# Patient Record
Sex: FEMALE | Race: WHITE
[De-identification: names, ages, dates, MRNs, and addresses within clinical notes are randomized per-mention and may not be internally consistent; named-entity substitution may affect disease eponyms.]

---

## 2017-02-15 ENCOUNTER — MYAURORA ACCOUNT LINK (OUTPATIENT)
Dept: OTHER | Age: 72
End: 2017-02-15

## 2017-02-15 ENCOUNTER — HOSPITAL ENCOUNTER (OUTPATIENT)
Dept: CV DIAGNOSTICS | Facility: HOSPITAL | Age: 72
Discharge: HOME | End: 2017-02-15
Attending: INTERNAL MEDICINE

## 2017-02-15 DIAGNOSIS — I35.0 AORTIC VALVE STENOSIS, UNSPECIFIED ETIOLOGY: ICD-10-CM

## 2017-02-21 ENCOUNTER — PRIOR ORIGINAL RECORDS (OUTPATIENT)
Dept: OTHER | Age: 72
End: 2017-02-21

## 2017-03-31 PROCEDURE — 86704 HEP B CORE ANTIBODY TOTAL: CPT | Performed by: INTERNAL MEDICINE

## 2017-03-31 PROCEDURE — 86480 TB TEST CELL IMMUN MEASURE: CPT | Performed by: INTERNAL MEDICINE

## 2017-09-21 PROCEDURE — 88175 CYTOPATH C/V AUTO FLUID REDO: CPT | Performed by: INTERNAL MEDICINE

## 2018-02-19 ENCOUNTER — HOSPITAL ENCOUNTER (OUTPATIENT)
Dept: CV DIAGNOSTICS | Facility: HOSPITAL | Age: 73
Discharge: HOME | End: 2018-02-19
Attending: INTERNAL MEDICINE

## 2018-02-19 ENCOUNTER — MYAURORA ACCOUNT LINK (OUTPATIENT)
Dept: OTHER | Age: 73
End: 2018-02-19

## 2018-02-19 ENCOUNTER — PRIOR ORIGINAL RECORDS (OUTPATIENT)
Dept: OTHER | Age: 73
End: 2018-02-19

## 2018-02-19 DIAGNOSIS — I25.10 CORONARY ARTERIOSCLEROSIS: ICD-10-CM

## 2018-02-27 ENCOUNTER — MYAURORA ACCOUNT LINK (OUTPATIENT)
Dept: OTHER | Age: 73
End: 2018-02-27

## 2018-02-27 ENCOUNTER — PRIOR ORIGINAL RECORDS (OUTPATIENT)
Dept: OTHER | Age: 73
End: 2018-02-27

## 2018-07-26 ENCOUNTER — PRIOR ORIGINAL RECORDS (OUTPATIENT)
Dept: OTHER | Age: 73
End: 2018-07-26

## 2018-09-19 PROCEDURE — 86480 TB TEST CELL IMMUN MEASURE: CPT | Performed by: INTERNAL MEDICINE

## 2018-11-29 PROCEDURE — 88175 CYTOPATH C/V AUTO FLUID REDO: CPT | Performed by: INTERNAL MEDICINE

## 2019-02-19 ENCOUNTER — PRIOR ORIGINAL RECORDS (OUTPATIENT)
Dept: OTHER | Age: 74
End: 2019-02-19

## 2019-02-28 VITALS
DIASTOLIC BLOOD PRESSURE: 68 MMHG | HEIGHT: 63 IN | BODY MASS INDEX: 28.17 KG/M2 | SYSTOLIC BLOOD PRESSURE: 118 MMHG | HEART RATE: 90 BPM | WEIGHT: 159 LBS

## 2019-02-28 VITALS
HEIGHT: 63 IN | HEART RATE: 90 BPM | BODY MASS INDEX: 28 KG/M2 | SYSTOLIC BLOOD PRESSURE: 110 MMHG | DIASTOLIC BLOOD PRESSURE: 62 MMHG | WEIGHT: 158 LBS

## 2019-03-01 VITALS
HEIGHT: 63 IN | DIASTOLIC BLOOD PRESSURE: 64 MMHG | WEIGHT: 164 LBS | BODY MASS INDEX: 29.06 KG/M2 | SYSTOLIC BLOOD PRESSURE: 118 MMHG | HEART RATE: 92 BPM

## 2019-03-21 ENCOUNTER — HOSPITAL ENCOUNTER (OUTPATIENT)
Dept: CV DIAGNOSTICS | Facility: HOSPITAL | Age: 74
Discharge: HOME | End: 2019-03-21
Attending: INTERNAL MEDICINE

## 2019-03-21 DIAGNOSIS — I35.0 AORTIC VALVE STENOSIS, ETIOLOGY OF CARDIAC VALVE DISEASE UNSPECIFIED: ICD-10-CM

## 2019-03-21 PROCEDURE — 93306 TTE W/DOPPLER COMPLETE: CPT | Performed by: INTERNAL MEDICINE

## 2019-03-28 ENCOUNTER — TELEPHONE (OUTPATIENT)
Dept: CARDIOLOGY | Age: 74
End: 2019-03-28

## 2019-04-04 ENCOUNTER — TELEPHONE (OUTPATIENT)
Dept: CARDIOLOGY | Age: 74
End: 2019-04-04

## 2019-04-05 ENCOUNTER — TELEPHONE (OUTPATIENT)
Dept: CARDIOLOGY | Age: 74
End: 2019-04-05

## 2019-04-05 RX ORDER — OLMESARTAN MEDOXOMIL AND HYDROCHLOROTHIAZIDE 20/12.5 20; 12.5 MG/1; MG/1
1 TABLET ORAL DAILY
Qty: 90 TABLET | Refills: 1 | Status: SHIPPED | OUTPATIENT
Start: 2019-04-05 | End: 2019-11-01 | Stop reason: ALTCHOICE

## 2019-04-05 RX ORDER — OLMESARTAN MEDOXOMIL AND HYDROCHLOROTHIAZIDE 20/12.5 20; 12.5 MG/1; MG/1
1 TABLET ORAL DAILY
Qty: 30 TABLET | Refills: 3 | Status: SHIPPED | OUTPATIENT
Start: 2019-04-05 | End: 2019-11-01 | Stop reason: ALTCHOICE

## 2019-04-05 RX ORDER — OLMESARTAN MEDOXOMIL AND HYDROCHLOROTHIAZIDE 20/12.5 20; 12.5 MG/1; MG/1
TABLET ORAL
COMMUNITY
Start: 2018-11-19 | End: 2019-04-05 | Stop reason: SDUPTHER

## 2019-04-05 RX ORDER — GABAPENTIN 100 MG/1
100 CAPSULE ORAL
COMMUNITY
Start: 2012-07-17 | End: 2020-02-25 | Stop reason: ALTCHOICE

## 2019-04-05 RX ORDER — HYDROXYCHLOROQUINE SULFATE 200 MG/1
TABLET, FILM COATED ORAL
COMMUNITY
Start: 2014-06-11 | End: 2020-02-25 | Stop reason: ALTCHOICE

## 2019-04-05 RX ORDER — INSULIN GLARGINE 100 [IU]/ML
INJECTION, SOLUTION SUBCUTANEOUS
COMMUNITY
Start: 2009-06-23 | End: 2020-02-25 | Stop reason: ALTCHOICE

## 2019-04-05 RX ORDER — LEVOTHYROXINE SODIUM 88 UG/1
TABLET ORAL
COMMUNITY
Start: 2012-07-17

## 2019-04-05 RX ORDER — METOPROLOL SUCCINATE 50 MG/1
50 TABLET, EXTENDED RELEASE ORAL
COMMUNITY
Start: 2018-02-27 | End: 2019-06-02 | Stop reason: SDUPTHER

## 2019-04-05 RX ORDER — FOLIC ACID 1 MG/1
1 TABLET ORAL
COMMUNITY
Start: 2015-01-13 | End: 2020-02-25 | Stop reason: ALTCHOICE

## 2019-04-05 RX ORDER — CETIRIZINE HYDROCHLORIDE 10 MG/1
10 TABLET ORAL
COMMUNITY
Start: 2012-07-17

## 2019-04-05 RX ORDER — IBUPROFEN 200 MG
500 CAPSULE ORAL
COMMUNITY
Start: 2012-07-17 | End: 2020-02-25 | Stop reason: ALTCHOICE

## 2019-04-05 RX ORDER — ATORVASTATIN CALCIUM 40 MG/1
40 TABLET, FILM COATED ORAL
COMMUNITY
Start: 2018-02-27 | End: 2019-11-14 | Stop reason: SDUPTHER

## 2019-04-05 RX ORDER — FAMOTIDINE 20 MG
TABLET ORAL
COMMUNITY
Start: 2012-07-17

## 2019-04-08 ENCOUNTER — TELEPHONE (OUTPATIENT)
Dept: CARDIOLOGY | Age: 74
End: 2019-04-08

## 2019-04-09 RX ORDER — LOSARTAN POTASSIUM AND HYDROCHLOROTHIAZIDE 12.5; 5 MG/1; MG/1
1 TABLET ORAL DAILY
Qty: 30 TABLET | Refills: 6 | Status: SHIPPED | OUTPATIENT
Start: 2019-04-09 | End: 2019-11-01 | Stop reason: ALTCHOICE

## 2019-04-11 ENCOUNTER — HOSPITAL ENCOUNTER (OUTPATIENT)
Age: 74
Discharge: HOME | End: 2019-04-11
Payer: MEDICARE

## 2019-04-11 VITALS
SYSTOLIC BLOOD PRESSURE: 163 MMHG | TEMPERATURE: 98 F | HEART RATE: 88 BPM | RESPIRATION RATE: 17 BRPM | OXYGEN SATURATION: 95 % | DIASTOLIC BLOOD PRESSURE: 69 MMHG

## 2019-04-11 DIAGNOSIS — T78.40XA ALLERGIC REACTION, INITIAL ENCOUNTER: Primary | ICD-10-CM

## 2019-04-11 PROCEDURE — 99204 OFFICE O/P NEW MOD 45 MIN: CPT

## 2019-04-11 PROCEDURE — 99213 OFFICE O/P EST LOW 20 MIN: CPT

## 2019-04-11 RX ORDER — FAMOTIDINE 20 MG/1
20 TABLET ORAL ONCE
Status: COMPLETED | OUTPATIENT
Start: 2019-04-11 | End: 2019-04-11

## 2019-04-11 RX ORDER — PREDNISONE 20 MG/1
40 TABLET ORAL DAILY
Qty: 10 TABLET | Refills: 0 | Status: SHIPPED | OUTPATIENT
Start: 2019-04-11 | End: 2019-04-16

## 2019-04-11 NOTE — ED PROVIDER NOTES
Patient Seen in: [facility]    History   Patient presents with:   Allergic Rxn Allergies (immune)    Stated Complaint: FACIAL SWELLING/REDNESS SINCE MONDAY-POSS REACTION TO MEDICATION     HPI  Patient is a 70-year-old female with • HX BREAST CANCER  9/21/10    Invasive Ductal CA   • IMPLANT LEFT  Nov. 2011    Reconstructive for mastectomy   • MASTECTOMY LEFT  9/21/10    Invasive Ductal CA   • OTHER SURGICAL HISTORY      basal cell removal   • PORT-A-CATHETER  REMOVAL N/A 2/16/2012 Psychiatric: She has a normal mood and affect. Her behavior is normal.   Nursing note and vitals reviewed. ED Course   Labs Reviewed - No data to display               MDM   History and physical consistent with allergic reaction.   Tomas

## 2019-04-11 NOTE — ED INITIAL ASSESSMENT (HPI)
Started having left side facial redness four days ago which progressed to full face. Very itchy. Patient on Cimzia injection which patient's  called the company about which per the company is a common adverse effect.  Denies tingling/tightness on thr

## 2019-04-25 RX ORDER — METHOTREXATE 2.5 MG/1
TABLET ORAL
COMMUNITY
End: 2020-02-25 | Stop reason: ALTCHOICE

## 2019-06-03 RX ORDER — METOPROLOL SUCCINATE 50 MG/1
TABLET, EXTENDED RELEASE ORAL
Qty: 90 TABLET | Refills: 2 | Status: SHIPPED | OUTPATIENT
Start: 2019-06-03 | End: 2020-02-20 | Stop reason: SDUPTHER

## 2019-10-25 ENCOUNTER — TELEPHONE (OUTPATIENT)
Dept: CARDIOLOGY | Age: 74
End: 2019-10-25

## 2019-10-28 RX ORDER — HYDROCHLOROTHIAZIDE 12.5 MG/1
12.5 CAPSULE, GELATIN COATED ORAL DAILY
Qty: 90 CAPSULE | Refills: 1 | Status: SHIPPED | OUTPATIENT
Start: 2019-10-28 | End: 2019-11-01 | Stop reason: SDUPTHER

## 2019-10-28 RX ORDER — LOSARTAN POTASSIUM 50 MG/1
50 TABLET ORAL DAILY
Qty: 90 TABLET | Refills: 1 | Status: SHIPPED | OUTPATIENT
Start: 2019-10-28 | End: 2019-11-01 | Stop reason: SDUPTHER

## 2019-10-31 RX ORDER — LOSARTAN POTASSIUM AND HYDROCHLOROTHIAZIDE 12.5; 5 MG/1; MG/1
1 TABLET ORAL DAILY
Qty: 30 TABLET | Refills: 5 | OUTPATIENT
Start: 2019-10-31

## 2019-11-01 RX ORDER — LOSARTAN POTASSIUM 50 MG/1
50 TABLET ORAL DAILY
Qty: 90 TABLET | Refills: 1 | Status: SHIPPED | OUTPATIENT
Start: 2019-11-01 | End: 2020-02-25 | Stop reason: SDUPTHER

## 2019-11-01 RX ORDER — HYDROCHLOROTHIAZIDE 12.5 MG/1
12.5 CAPSULE, GELATIN COATED ORAL DAILY
Qty: 90 CAPSULE | Refills: 1 | Status: SHIPPED | OUTPATIENT
Start: 2019-11-01 | End: 2020-02-25 | Stop reason: SDUPTHER

## 2019-11-08 ENCOUNTER — DOCUMENTATION (OUTPATIENT)
Dept: CARDIOLOGY | Age: 74
End: 2019-11-08

## 2019-11-14 ENCOUNTER — TELEPHONE (OUTPATIENT)
Dept: CARDIOLOGY | Age: 74
End: 2019-11-14

## 2019-11-14 DIAGNOSIS — E78.00 PURE HYPERCHOLESTEROLEMIA: Primary | ICD-10-CM

## 2019-11-14 RX ORDER — ATORVASTATIN CALCIUM 40 MG/1
TABLET, FILM COATED ORAL
Qty: 30 TABLET | Refills: 0 | Status: SHIPPED | OUTPATIENT
Start: 2019-11-14 | End: 2019-12-12 | Stop reason: SDUPTHER

## 2019-11-25 ENCOUNTER — LAB ENCOUNTER (OUTPATIENT)
Dept: LAB | Facility: HOSPITAL | Age: 74
End: 2019-11-25
Attending: INTERNAL MEDICINE
Payer: MEDICARE

## 2019-11-25 DIAGNOSIS — E78.00 PURE HYPERCHOLESTEROLEMIA: Primary | ICD-10-CM

## 2019-11-25 LAB
CHOLEST SERPL-MCNC: 136 MG/DL
CHOLEST/HDLC SERPL: NORMAL {RATIO}
HDLC SERPL-MCNC: 62 MG/DL
LDLC SERPL CALC-MCNC: 44 MG/DL
LENGTH OF FAST TIME PATIENT: NORMAL H
NONHDLC SERPL-MCNC: NORMAL MG/DL
TRIGL SERPL-MCNC: 151 MG/DL
VLDLC SERPL CALC-MCNC: NORMAL MG/DL

## 2019-11-25 PROCEDURE — 80061 LIPID PANEL: CPT

## 2019-11-25 PROCEDURE — 36415 COLL VENOUS BLD VENIPUNCTURE: CPT

## 2019-11-29 ENCOUNTER — TELEPHONE (OUTPATIENT)
Dept: CARDIOLOGY | Age: 74
End: 2019-11-29

## 2019-11-29 ENCOUNTER — CLINICAL ABSTRACT (OUTPATIENT)
Dept: CARDIOLOGY | Age: 74
End: 2019-11-29

## 2019-12-12 DIAGNOSIS — E78.00 PURE HYPERCHOLESTEROLEMIA: ICD-10-CM

## 2019-12-12 RX ORDER — ATORVASTATIN CALCIUM 40 MG/1
TABLET, FILM COATED ORAL
Qty: 90 TABLET | Refills: 3 | Status: SHIPPED | OUTPATIENT
Start: 2019-12-12 | End: 2020-12-08

## 2019-12-23 ENCOUNTER — HOSPITAL ENCOUNTER (EMERGENCY)
Facility: HOSPITAL | Age: 74
Discharge: HOME | End: 2019-12-23
Attending: EMERGENCY MEDICINE
Payer: MEDICARE

## 2019-12-23 VITALS
TEMPERATURE: 97 F | HEART RATE: 80 BPM | WEIGHT: 150 LBS | DIASTOLIC BLOOD PRESSURE: 79 MMHG | SYSTOLIC BLOOD PRESSURE: 167 MMHG | OXYGEN SATURATION: 99 % | HEIGHT: 63 IN | RESPIRATION RATE: 18 BRPM | BODY MASS INDEX: 26.58 KG/M2

## 2019-12-23 DIAGNOSIS — E10.649 TYPE 1 DIABETES MELLITUS WITH HYPOGLYCEMIA WITHOUT COMA (HCC): Primary | ICD-10-CM

## 2019-12-23 DIAGNOSIS — E87.6 HYPOKALEMIA: ICD-10-CM

## 2019-12-23 PROCEDURE — 81001 URINALYSIS AUTO W/SCOPE: CPT | Performed by: EMERGENCY MEDICINE

## 2019-12-23 PROCEDURE — 99284 EMERGENCY DEPT VISIT MOD MDM: CPT

## 2019-12-23 PROCEDURE — 80053 COMPREHEN METABOLIC PANEL: CPT | Performed by: EMERGENCY MEDICINE

## 2019-12-23 PROCEDURE — 96374 THER/PROPH/DIAG INJ IV PUSH: CPT

## 2019-12-23 PROCEDURE — 82962 GLUCOSE BLOOD TEST: CPT

## 2019-12-23 PROCEDURE — 85025 COMPLETE CBC W/AUTO DIFF WBC: CPT | Performed by: EMERGENCY MEDICINE

## 2019-12-23 RX ORDER — DEXTROSE MONOHYDRATE 25 G/50ML
50 INJECTION, SOLUTION INTRAVENOUS ONCE
Status: COMPLETED | OUTPATIENT
Start: 2019-12-23 | End: 2019-12-23

## 2019-12-23 RX ORDER — POTASSIUM CHLORIDE 20 MEQ/1
40 TABLET, EXTENDED RELEASE ORAL ONCE
Status: COMPLETED | OUTPATIENT
Start: 2019-12-23 | End: 2019-12-23

## 2019-12-23 NOTE — ED INITIAL ASSESSMENT (HPI)
Patient is here via NFD with c/o hypoglycemia. Patient was getting up to use the restroom was having right sided weakness. Upon EMS arrival, patient's blood sugar was 31. Patient was given approximately 100cc of D10.   Patient AOx3, reports feeling \"a l

## 2019-12-23 NOTE — ED PROVIDER NOTES
Patient Seen in: [facility] Emergency Department      History   Patient presents with:  Hypoglycemia    Stated Complaint: hypoglycemia    HPI    51-year-old female comes to the hospital complaint of having noted to be feeling very weak this morning. complaint: hypoglycemia  Other systems are as noted in HPI. Constitutional and vital signs reviewed. All other systems reviewed and negative except as noted above.     Physical Exam     ED Triage Vitals [12/23/19 0701]   BP (!) 176/75   Pulse 85   Res for the following components:    WBC 11.4 (*)     HGB 11.6 (*)     MCHC 30.9 (*)     Lymphocyte Absolute 4.09 (*)     All other components within normal limits   CBC WITH DIFFERENTIAL WITH PLATELET    Narrative:      The following orders were created for pa

## 2019-12-23 NOTE — ED NOTES
Patient went to the bathroom via wheel chair stable specimen obtained  Her daughter will be bringing her breakfast

## 2020-02-20 ENCOUNTER — TELEPHONE (OUTPATIENT)
Dept: CARDIOLOGY | Age: 75
End: 2020-02-20

## 2020-02-20 RX ORDER — METOPROLOL SUCCINATE 50 MG/1
TABLET, EXTENDED RELEASE ORAL
Qty: 90 TABLET | Refills: 0 | Status: SHIPPED | OUTPATIENT
Start: 2020-02-20 | End: 2020-07-13

## 2020-02-25 ENCOUNTER — OFFICE VISIT (OUTPATIENT)
Dept: CARDIOLOGY | Age: 75
End: 2020-02-25

## 2020-02-25 VITALS
BODY MASS INDEX: 27.82 KG/M2 | HEIGHT: 63 IN | HEART RATE: 96 BPM | SYSTOLIC BLOOD PRESSURE: 126 MMHG | DIASTOLIC BLOOD PRESSURE: 72 MMHG | WEIGHT: 157 LBS

## 2020-02-25 DIAGNOSIS — I25.10 ATHEROSCLEROSIS OF NATIVE CORONARY ARTERY OF NATIVE HEART WITHOUT ANGINA PECTORIS: Primary | ICD-10-CM

## 2020-02-25 DIAGNOSIS — I35.0 NONRHEUMATIC AORTIC VALVE STENOSIS: ICD-10-CM

## 2020-02-25 PROCEDURE — 99214 OFFICE O/P EST MOD 30 MIN: CPT | Performed by: INTERNAL MEDICINE

## 2020-02-25 RX ORDER — GARLIC EXTRACT 500 MG
1 CAPSULE ORAL
COMMUNITY

## 2020-02-25 RX ORDER — HYDROCHLOROTHIAZIDE 12.5 MG/1
12.5 CAPSULE, GELATIN COATED ORAL DAILY
Qty: 90 CAPSULE | Refills: 3 | Status: SHIPPED | OUTPATIENT
Start: 2020-02-25

## 2020-02-25 RX ORDER — PREDNISONE 5 MG/1
5 TABLET ORAL
COMMUNITY
Start: 2019-12-16

## 2020-02-25 RX ORDER — TRAZODONE HYDROCHLORIDE 100 MG/1
100 TABLET ORAL
COMMUNITY
Start: 2019-12-09 | End: 2020-12-03

## 2020-02-25 RX ORDER — LOSARTAN POTASSIUM 50 MG/1
50 TABLET ORAL DAILY
Qty: 90 TABLET | Refills: 3 | Status: SHIPPED | OUTPATIENT
Start: 2020-02-25

## 2020-02-25 SDOH — SOCIAL STABILITY: SOCIAL NETWORK: ARE YOU MARRIED, WIDOWED, DIVORCED, SEPARATED, NEVER MARRIED, OR LIVING WITH A PARTNER?: MARRIED

## 2020-02-25 SDOH — HEALTH STABILITY: MENTAL HEALTH: HOW OFTEN DO YOU HAVE A DRINK CONTAINING ALCOHOL?: NEVER

## 2020-02-25 SDOH — HEALTH STABILITY: PHYSICAL HEALTH: ON AVERAGE, HOW MANY DAYS PER WEEK DO YOU ENGAGE IN MODERATE TO STRENUOUS EXERCISE (LIKE A BRISK WALK)?: 3 DAYS

## 2020-02-25 SDOH — HEALTH STABILITY: PHYSICAL HEALTH: ON AVERAGE, HOW MANY MINUTES DO YOU ENGAGE IN EXERCISE AT THIS LEVEL?: 30 MIN

## 2020-02-25 ASSESSMENT — PATIENT HEALTH QUESTIONNAIRE - PHQ9
SUM OF ALL RESPONSES TO PHQ9 QUESTIONS 1 AND 2: 0
1. LITTLE INTEREST OR PLEASURE IN DOING THINGS: NOT AT ALL
SUM OF ALL RESPONSES TO PHQ9 QUESTIONS 1 AND 2: 0
2. FEELING DOWN, DEPRESSED OR HOPELESS: NOT AT ALL

## 2020-06-25 ENCOUNTER — HOSPITAL ENCOUNTER (OUTPATIENT)
Dept: CV DIAGNOSTICS | Facility: HOSPITAL | Age: 75
Discharge: HOME | End: 2020-06-25
Attending: INTERNAL MEDICINE
Payer: MEDICARE

## 2020-06-25 DIAGNOSIS — I35.0 NONRHEUMATIC AORTIC VALVE STENOSIS: ICD-10-CM

## 2020-06-25 PROCEDURE — 93306 TTE W/DOPPLER COMPLETE: CPT | Performed by: INTERNAL MEDICINE

## 2020-06-26 DIAGNOSIS — I35.0 NONRHEUMATIC AORTIC VALVE STENOSIS: ICD-10-CM

## 2020-06-29 ENCOUNTER — TELEPHONE (OUTPATIENT)
Dept: CARDIOLOGY | Age: 75
End: 2020-06-29

## 2020-07-13 RX ORDER — METOPROLOL SUCCINATE 50 MG/1
TABLET, EXTENDED RELEASE ORAL
Qty: 90 TABLET | Refills: 3 | Status: SHIPPED | OUTPATIENT
Start: 2020-07-13

## 2020-12-05 DIAGNOSIS — E78.00 PURE HYPERCHOLESTEROLEMIA: ICD-10-CM

## 2020-12-08 RX ORDER — ATORVASTATIN CALCIUM 40 MG/1
TABLET, FILM COATED ORAL
Qty: 90 TABLET | Refills: 3 | Status: SHIPPED | OUTPATIENT
Start: 2020-12-08

## 2021-03-04 ENCOUNTER — TELEPHONE (OUTPATIENT)
Dept: CARDIOLOGY | Age: 76
End: 2021-03-04

## 2021-03-26 ENCOUNTER — APPOINTMENT (OUTPATIENT)
Dept: CT IMAGING | Facility: HOSPITAL | Age: 76
End: 2021-03-26
Attending: EMERGENCY MEDICINE
Payer: MEDICARE

## 2021-03-26 ENCOUNTER — HOSPITAL ENCOUNTER (EMERGENCY)
Facility: HOSPITAL | Age: 76
Discharge: HOME | End: 2021-03-26
Attending: EMERGENCY MEDICINE
Payer: MEDICARE

## 2021-03-26 VITALS
SYSTOLIC BLOOD PRESSURE: 178 MMHG | DIASTOLIC BLOOD PRESSURE: 78 MMHG | HEART RATE: 67 BPM | BODY MASS INDEX: 26.58 KG/M2 | WEIGHT: 150 LBS | RESPIRATION RATE: 20 BRPM | HEIGHT: 63 IN | OXYGEN SATURATION: 94 %

## 2021-03-26 DIAGNOSIS — H81.13 BENIGN PAROXYSMAL POSITIONAL VERTIGO DUE TO BILATERAL VESTIBULAR DISORDER: Primary | ICD-10-CM

## 2021-03-26 LAB
ALBUMIN SERPL-MCNC: 4.2 G/DL (ref 3.4–5)
ALBUMIN/GLOB SERPL: 0.9 {RATIO} (ref 1–2)
ALP LIVER SERPL-CCNC: 76 U/L
ALT SERPL-CCNC: 33 U/L
ANION GAP SERPL CALC-SCNC: 9 MMOL/L (ref 0–18)
AST SERPL-CCNC: 36 U/L (ref 15–37)
BASOPHILS # BLD AUTO: 0.05 X10(3) UL (ref 0–0.2)
BASOPHILS NFR BLD AUTO: 0.3 %
BILIRUB SERPL-MCNC: 0.5 MG/DL (ref 0.1–2)
BUN BLD-MCNC: 10 MG/DL (ref 7–18)
BUN/CREAT SERPL: 9.9 (ref 10–20)
CALCIUM BLD-MCNC: 9.6 MG/DL (ref 8.5–10.1)
CHLORIDE SERPL-SCNC: 99 MMOL/L (ref 98–112)
CO2 SERPL-SCNC: 24 MMOL/L (ref 21–32)
CREAT BLD-MCNC: 1.01 MG/DL
DEPRECATED RDW RBC AUTO: 45.7 FL (ref 35.1–46.3)
EOSINOPHIL # BLD AUTO: 0.02 X10(3) UL (ref 0–0.7)
EOSINOPHIL NFR BLD AUTO: 0.1 %
ERYTHROCYTE [DISTWIDTH] IN BLOOD BY AUTOMATED COUNT: 14.6 % (ref 11–15)
GLOBULIN PLAS-MCNC: 4.9 G/DL (ref 2.8–4.4)
GLUCOSE BLD-MCNC: 293 MG/DL (ref 70–99)
HCT VFR BLD AUTO: 41.2 %
HGB BLD-MCNC: 13.5 G/DL
IMM GRANULOCYTES # BLD AUTO: 0.14 X10(3) UL (ref 0–1)
IMM GRANULOCYTES NFR BLD: 0.9 %
LYMPHOCYTES # BLD AUTO: 4.36 X10(3) UL (ref 1–4)
LYMPHOCYTES NFR BLD AUTO: 29.3 %
M PROTEIN MFR SERPL ELPH: 9.1 G/DL (ref 6.4–8.2)
MCH RBC QN AUTO: 28.2 PG (ref 26–34)
MCHC RBC AUTO-ENTMCNC: 32.8 G/DL (ref 31–37)
MCV RBC AUTO: 86 FL
MONOCYTES # BLD AUTO: 0.94 X10(3) UL (ref 0.1–1)
MONOCYTES NFR BLD AUTO: 6.3 %
NEUTROPHILS # BLD AUTO: 9.35 X10 (3) UL (ref 1.5–7.7)
NEUTROPHILS # BLD AUTO: 9.35 X10(3) UL (ref 1.5–7.7)
NEUTROPHILS NFR BLD AUTO: 63.1 %
OSMOLALITY SERPL CALC.SUM OF ELEC: 284 MOSM/KG (ref 275–295)
PLATELET # BLD AUTO: 282 10(3)UL (ref 150–450)
POTASSIUM SERPL-SCNC: 3.6 MMOL/L (ref 3.5–5.1)
RBC # BLD AUTO: 4.79 X10(6)UL
SODIUM SERPL-SCNC: 132 MMOL/L (ref 136–145)
TROPONIN I SERPL-MCNC: <0.045 NG/ML (ref ?–0.04)
WBC # BLD AUTO: 14.9 X10(3) UL (ref 4–11)

## 2021-03-26 PROCEDURE — 84484 ASSAY OF TROPONIN QUANT: CPT | Performed by: EMERGENCY MEDICINE

## 2021-03-26 PROCEDURE — 96374 THER/PROPH/DIAG INJ IV PUSH: CPT

## 2021-03-26 PROCEDURE — 80053 COMPREHEN METABOLIC PANEL: CPT | Performed by: EMERGENCY MEDICINE

## 2021-03-26 PROCEDURE — 93010 ELECTROCARDIOGRAM REPORT: CPT

## 2021-03-26 PROCEDURE — 99285 EMERGENCY DEPT VISIT HI MDM: CPT

## 2021-03-26 PROCEDURE — 96361 HYDRATE IV INFUSION ADD-ON: CPT

## 2021-03-26 PROCEDURE — 70450 CT HEAD/BRAIN W/O DYE: CPT | Performed by: EMERGENCY MEDICINE

## 2021-03-26 PROCEDURE — 93005 ELECTROCARDIOGRAM TRACING: CPT

## 2021-03-26 PROCEDURE — 85025 COMPLETE CBC W/AUTO DIFF WBC: CPT | Performed by: EMERGENCY MEDICINE

## 2021-03-26 RX ORDER — MECLIZINE HYDROCHLORIDE 25 MG/1
25 TABLET ORAL 3 TIMES DAILY PRN
Qty: 20 TABLET | Refills: 0 | Status: SHIPPED | OUTPATIENT
Start: 2021-03-26 | End: 2021-11-15

## 2021-03-26 RX ORDER — ONDANSETRON 2 MG/ML
4 INJECTION INTRAMUSCULAR; INTRAVENOUS ONCE
Status: COMPLETED | OUTPATIENT
Start: 2021-03-26 | End: 2021-03-26

## 2021-03-26 RX ORDER — MECLIZINE HYDROCHLORIDE 25 MG/1
25 TABLET ORAL ONCE
Status: COMPLETED | OUTPATIENT
Start: 2021-03-26 | End: 2021-03-26

## 2021-03-26 RX ORDER — ONDANSETRON 4 MG/1
4 TABLET, ORALLY DISINTEGRATING ORAL EVERY 4 HOURS PRN
Qty: 10 TABLET | Refills: 0 | Status: SHIPPED | OUTPATIENT
Start: 2021-03-26 | End: 2021-04-02

## 2021-03-27 LAB
ATRIAL RATE: 99 BPM
P-R INTERVAL: 138 MS
Q-T INTERVAL: 382 MS
QRS DURATION: 98 MS
QTC CALCULATION (BEZET): 490 MS
R AXIS: 220 DEGREES
T AXIS: 78 DEGREES
VENTRICULAR RATE: 99 BPM

## 2021-03-27 NOTE — ED INITIAL ASSESSMENT (HPI)
Here for dizziness since yesterday, + fall yesterday with no head injury. Alert & coherent + nausea with dizziness.

## 2021-03-27 NOTE — ED PROVIDER NOTES
Patient Seen in: [facility] Emergency Department      History   Patient presents with:  Dizziness    Stated Complaint: dizziness x 1-2 hours, did have fall yesterday.  no blood thinners, did not hit *    HPI/Subjective:   HPI    77-year-old female pr Review of Systems    Positive for stated complaint: dizziness x 1-2 hours, did have fall yesterday. no blood thinners, did not hit *  Other systems are as noted in HPI. Constitutional and vital signs reviewed.       All other systems reviewed and neg pulses. Neuro: Cranial nerves II through XII intact with no gross focal sensory or motor abnormality.   Cerebellar function intact finger-to-nose intact      ED Course     Labs Reviewed   COMP METABOLIC PANEL (14) - Abnormal; Notable for the following comp INDICATIONS:  dizziness x 1-2 hours, did have fall yesterday. no blood thinners, did not hit head. no weakness, no speech changes  TECHNIQUE:  Noncontrast CT scanning is performed through the brain. Dose reduction techniques were used.  Dose information is followup with the appropriate physician. Patient verbalizes and agrees with plan. Patient discharged in good condition. This note was prepared using [employer] voice recognition dictation software. As a result errors may occur.  When identified t

## 2021-06-01 ENCOUNTER — APPOINTMENT (OUTPATIENT)
Dept: CARDIOLOGY | Age: 76
End: 2021-06-01

## 2021-10-26 PROBLEM — E03.9 HYPOTHYROIDISM, UNSPECIFIED TYPE: Status: ACTIVE | Noted: 2021-10-26

## 2022-03-24 PROBLEM — Z79.899 IMMUNOSUPPRESSION DUE TO DRUG THERAPY (HCC): Status: ACTIVE | Noted: 2022-03-24

## 2022-03-24 PROBLEM — D84.821 IMMUNOSUPPRESSION DUE TO DRUG THERAPY: Status: ACTIVE | Noted: 2022-03-24

## 2022-03-24 PROBLEM — Z79.899 IMMUNOSUPPRESSION DUE TO DRUG THERAPY: Status: ACTIVE | Noted: 2022-03-24

## 2022-03-24 PROBLEM — G31.9 CEREBELLAR ATROPHY (HCC): Status: ACTIVE | Noted: 2022-03-24

## 2022-03-24 PROBLEM — E11.65 TYPE 2 DIABETES MELLITUS WITH HYPERGLYCEMIA, WITH LONG-TERM CURRENT USE OF INSULIN (HCC): Status: ACTIVE | Noted: 2022-03-24

## 2022-03-24 PROBLEM — D84.821 IMMUNOSUPPRESSION DUE TO DRUG THERAPY (HCC): Status: ACTIVE | Noted: 2022-03-24

## 2022-03-24 PROBLEM — Z79.4 TYPE 2 DIABETES MELLITUS WITH HYPERGLYCEMIA, WITH LONG-TERM CURRENT USE OF INSULIN (HCC): Status: ACTIVE | Noted: 2022-03-24

## 2022-03-24 PROBLEM — G31.9 CEREBELLAR ATROPHY: Status: ACTIVE | Noted: 2022-03-24

## 2022-04-07 PROBLEM — Z90.12 S/P LEFT MASTECTOMY: Status: ACTIVE | Noted: 2022-04-07

## 2022-08-30 PROCEDURE — 99283 EMERGENCY DEPT VISIT LOW MDM: CPT

## 2022-08-30 RX ORDER — IBUPROFEN 600 MG/1
600 TABLET ORAL ONCE
Status: COMPLETED | OUTPATIENT
Start: 2022-08-30 | End: 2022-08-30

## 2022-08-31 ENCOUNTER — APPOINTMENT (OUTPATIENT)
Dept: GENERAL RADIOLOGY | Facility: HOSPITAL | Age: 77
End: 2022-08-31
Attending: EMERGENCY MEDICINE
Payer: MEDICARE

## 2022-08-31 ENCOUNTER — HOSPITAL ENCOUNTER (EMERGENCY)
Facility: HOSPITAL | Age: 77
Discharge: HOME | End: 2022-08-31
Attending: EMERGENCY MEDICINE
Payer: MEDICARE

## 2022-08-31 VITALS
BODY MASS INDEX: 26.58 KG/M2 | SYSTOLIC BLOOD PRESSURE: 155 MMHG | WEIGHT: 150 LBS | OXYGEN SATURATION: 94 % | TEMPERATURE: 97 F | DIASTOLIC BLOOD PRESSURE: 53 MMHG | HEART RATE: 89 BPM | HEIGHT: 63 IN | RESPIRATION RATE: 14 BRPM

## 2022-08-31 DIAGNOSIS — M54.31 SCIATICA OF RIGHT SIDE: Primary | ICD-10-CM

## 2022-08-31 PROCEDURE — 72110 X-RAY EXAM L-2 SPINE 4/>VWS: CPT | Performed by: EMERGENCY MEDICINE

## 2022-08-31 RX ORDER — NAPROXEN 375 MG/1
375 TABLET ORAL 2 TIMES DAILY PRN
Qty: 20 TABLET | Refills: 0 | Status: SHIPPED | OUTPATIENT
Start: 2022-08-31 | End: 2022-09-07

## 2022-08-31 RX ORDER — CYCLOBENZAPRINE HCL 10 MG
5 TABLET ORAL 3 TIMES DAILY PRN
Qty: 20 TABLET | Refills: 0 | Status: SHIPPED | OUTPATIENT
Start: 2022-08-31 | End: 2022-09-07

## 2022-08-31 RX ORDER — CYCLOBENZAPRINE HCL 5 MG
5 TABLET ORAL 3 TIMES DAILY PRN
Status: DISCONTINUED | OUTPATIENT
Start: 2022-08-31 | End: 2022-08-31

## 2022-08-31 RX ORDER — TRAMADOL HYDROCHLORIDE 50 MG/1
50 TABLET ORAL ONCE
Status: COMPLETED | OUTPATIENT
Start: 2022-08-31 | End: 2022-08-31

## 2022-08-31 NOTE — ED INITIAL ASSESSMENT (HPI)
Pt c/o right lower back pain since yesterday, painful to walk. Denies N/V/D, denies dysuria. Pt states pain radiates down right leg. Denies injury.

## 2023-08-06 ENCOUNTER — APPOINTMENT (OUTPATIENT)
Dept: GENERAL RADIOLOGY | Facility: HOSPITAL | Age: 78
End: 2023-08-06
Attending: EMERGENCY MEDICINE
Payer: MEDICARE

## 2023-08-06 ENCOUNTER — APPOINTMENT (OUTPATIENT)
Dept: CT IMAGING | Facility: HOSPITAL | Age: 78
End: 2023-08-06
Attending: EMERGENCY MEDICINE
Payer: MEDICARE

## 2023-08-06 ENCOUNTER — HOSPITAL ENCOUNTER (INPATIENT)
Facility: HOSPITAL | Age: 78
LOS: 2 days | Discharge: HOME | End: 2023-08-08
Attending: EMERGENCY MEDICINE | Admitting: INTERNAL MEDICINE
Payer: MEDICARE

## 2023-08-06 DIAGNOSIS — A41.9 SEPSIS DUE TO UNDETERMINED ORGANISM (HCC): Primary | ICD-10-CM

## 2023-08-06 LAB
ALBUMIN SERPL-MCNC: 3.3 G/DL (ref 3.4–5)
ALBUMIN/GLOB SERPL: 0.6 {RATIO} (ref 1–2)
ALP LIVER SERPL-CCNC: 75 U/L
ALT SERPL-CCNC: 15 U/L
ANION GAP SERPL CALC-SCNC: 6 MMOL/L (ref 0–18)
AST SERPL-CCNC: 38 U/L (ref 15–37)
ATRIAL RATE: 112 BPM
BASOPHILS # BLD AUTO: 0.07 X10(3) UL (ref 0–0.2)
BASOPHILS NFR BLD AUTO: 0.3 %
BILIRUB SERPL-MCNC: 0.5 MG/DL (ref 0.1–2)
BILIRUB UR QL STRIP.AUTO: NEGATIVE
BUN BLD-MCNC: 9 MG/DL (ref 7–18)
CALCIUM BLD-MCNC: 9.4 MG/DL (ref 8.5–10.1)
CHLORIDE SERPL-SCNC: 103 MMOL/L (ref 98–112)
CLARITY UR REFRACT.AUTO: CLEAR
CO2 SERPL-SCNC: 27 MMOL/L (ref 21–32)
COLOR UR AUTO: YELLOW
CREAT BLD-MCNC: 0.86 MG/DL
EGFRCR SERPLBLD CKD-EPI 2021: 70 ML/MIN/1.73M2 (ref 60–?)
EOSINOPHIL # BLD AUTO: 0.12 X10(3) UL (ref 0–0.7)
EOSINOPHIL NFR BLD AUTO: 0.5 %
ERYTHROCYTE [DISTWIDTH] IN BLOOD BY AUTOMATED COUNT: 13.3 %
GLOBULIN PLAS-MCNC: 5.1 G/DL (ref 2.8–4.4)
GLUCOSE BLD-MCNC: 103 MG/DL (ref 70–99)
GLUCOSE BLD-MCNC: 131 MG/DL (ref 70–99)
GLUCOSE BLD-MCNC: 200 MG/DL (ref 70–99)
GLUCOSE BLD-MCNC: 69 MG/DL (ref 70–99)
GLUCOSE UR STRIP.AUTO-MCNC: NEGATIVE MG/DL
HCT VFR BLD AUTO: 40.9 %
HGB BLD-MCNC: 13.6 G/DL
IMM GRANULOCYTES # BLD AUTO: 0.13 X10(3) UL (ref 0–1)
IMM GRANULOCYTES NFR BLD: 0.6 %
KETONES UR STRIP.AUTO-MCNC: NEGATIVE MG/DL
LACTATE SERPL-SCNC: 1.2 MMOL/L (ref 0.4–2)
LEUKOCYTE ESTERASE UR QL STRIP.AUTO: NEGATIVE
LYMPHOCYTES # BLD AUTO: 1.67 X10(3) UL (ref 1–4)
LYMPHOCYTES NFR BLD AUTO: 7.3 %
MCH RBC QN AUTO: 29.6 PG (ref 26–34)
MCHC RBC AUTO-ENTMCNC: 33.3 G/DL (ref 31–37)
MCV RBC AUTO: 89.1 FL
MONOCYTES # BLD AUTO: 1.43 X10(3) UL (ref 0.1–1)
MONOCYTES NFR BLD AUTO: 6.3 %
NEUTROPHILS # BLD AUTO: 19.46 X10 (3) UL (ref 1.5–7.7)
NEUTROPHILS # BLD AUTO: 19.46 X10(3) UL (ref 1.5–7.7)
NEUTROPHILS NFR BLD AUTO: 85 %
NITRITE UR QL STRIP.AUTO: NEGATIVE
OSMOLALITY SERPL CALC.SUM OF ELEC: 281 MOSM/KG (ref 275–295)
P AXIS: -31 DEGREES
P-R INTERVAL: 128 MS
PH UR STRIP.AUTO: 6 [PH] (ref 5–8)
PLATELET # BLD AUTO: 387 10(3)UL (ref 150–450)
POTASSIUM SERPL-SCNC: 3.4 MMOL/L (ref 3.5–5.1)
PROT SERPL-MCNC: 8.4 G/DL (ref 6.4–8.2)
PROT UR STRIP.AUTO-MCNC: 30 MG/DL
Q-T INTERVAL: 294 MS
QRS DURATION: 98 MS
QTC CALCULATION (BEZET): 401 MS
R AXIS: -33 DEGREES
RBC # BLD AUTO: 4.59 X10(6)UL
RBC UR QL AUTO: NEGATIVE
SARS-COV-2 RNA RESP QL NAA+PROBE: NOT DETECTED
SODIUM SERPL-SCNC: 136 MMOL/L (ref 136–145)
SP GR UR STRIP.AUTO: 1.01 (ref 1–1.03)
T AXIS: 45 DEGREES
UROBILINOGEN UR STRIP.AUTO-MCNC: <2 MG/DL
VENTRICULAR RATE: 112 BPM
WBC # BLD AUTO: 22.9 X10(3) UL (ref 4–11)

## 2023-08-06 PROCEDURE — 80053 COMPREHEN METABOLIC PANEL: CPT

## 2023-08-06 PROCEDURE — 81001 URINALYSIS AUTO W/SCOPE: CPT | Performed by: EMERGENCY MEDICINE

## 2023-08-06 PROCEDURE — 80053 COMPREHEN METABOLIC PANEL: CPT | Performed by: EMERGENCY MEDICINE

## 2023-08-06 PROCEDURE — 74177 CT ABD & PELVIS W/CONTRAST: CPT | Performed by: EMERGENCY MEDICINE

## 2023-08-06 PROCEDURE — 87040 BLOOD CULTURE FOR BACTERIA: CPT | Performed by: EMERGENCY MEDICINE

## 2023-08-06 PROCEDURE — 71045 X-RAY EXAM CHEST 1 VIEW: CPT | Performed by: EMERGENCY MEDICINE

## 2023-08-06 PROCEDURE — 93005 ELECTROCARDIOGRAM TRACING: CPT

## 2023-08-06 PROCEDURE — 85025 COMPLETE CBC W/AUTO DIFF WBC: CPT

## 2023-08-06 PROCEDURE — 83605 ASSAY OF LACTIC ACID: CPT | Performed by: EMERGENCY MEDICINE

## 2023-08-06 PROCEDURE — 85025 COMPLETE CBC W/AUTO DIFF WBC: CPT | Performed by: EMERGENCY MEDICINE

## 2023-08-06 PROCEDURE — 84145 PROCALCITONIN (PCT): CPT | Performed by: INTERNAL MEDICINE

## 2023-08-06 PROCEDURE — 82962 GLUCOSE BLOOD TEST: CPT

## 2023-08-06 RX ORDER — ONDANSETRON 2 MG/ML
4 INJECTION INTRAMUSCULAR; INTRAVENOUS EVERY 4 HOURS PRN
Status: ACTIVE | OUTPATIENT
Start: 2023-08-06 | End: 2023-08-06

## 2023-08-06 RX ORDER — ACETAMINOPHEN 500 MG
500 TABLET ORAL EVERY 4 HOURS PRN
Status: DISCONTINUED | OUTPATIENT
Start: 2023-08-06 | End: 2023-08-08

## 2023-08-06 RX ORDER — ACETAMINOPHEN 500 MG
1000 TABLET ORAL ONCE
Status: COMPLETED | OUTPATIENT
Start: 2023-08-06 | End: 2023-08-06

## 2023-08-06 RX ORDER — DEXTROSE MONOHYDRATE 25 G/50ML
50 INJECTION, SOLUTION INTRAVENOUS AS NEEDED
Status: DISCONTINUED | OUTPATIENT
Start: 2023-08-06 | End: 2023-08-07

## 2023-08-06 RX ORDER — HEPARIN SODIUM 5000 [USP'U]/ML
5000 INJECTION, SOLUTION INTRAVENOUS; SUBCUTANEOUS EVERY 8 HOURS SCHEDULED
Status: DISCONTINUED | OUTPATIENT
Start: 2023-08-06 | End: 2023-08-08

## 2023-08-06 RX ORDER — TRAMADOL HYDROCHLORIDE 50 MG/1
50 TABLET ORAL EVERY 8 HOURS PRN
COMMUNITY
Start: 2023-07-01

## 2023-08-06 RX ORDER — SODIUM CHLORIDE 9 MG/ML
INJECTION, SOLUTION INTRAVENOUS CONTINUOUS
Status: DISCONTINUED | OUTPATIENT
Start: 2023-08-06 | End: 2023-08-08

## 2023-08-06 NOTE — ED QUICK NOTES
Orders for admission, patient is aware of plan and ready to go upstairs. Any questions, please call ED RN Nataly Haro at extension [phone].      Patient Covid vaccination status: Fully vaccinated     COVID Test Ordered in ED: Rapid SARS-CoV-2 by PCR    COVID Suspicion at Admission: N/A    Running Infusions:  None    Mental Status/LOC at time of transport: Alert    Other pertinent information:   CIWA score: N/A   NIH score:  N/A

## 2023-08-06 NOTE — PROGRESS NOTES
ED Antibiotic Dose Adjustment by Pharmacy    piperacillin/tazobactam (ZOSYN) 3.375 g x1 dose has been ordered. Pharmacy has adjusted the dose to piperacillin/tazobactam (ZOSYN) 4.5 g x1 per hospital protocol.     Cindy Calabrese, PharmD  Clinical Pharmacist Specialist- Emergency Medicine  [facility]  [phone]

## 2023-08-06 NOTE — ED INITIAL ASSESSMENT (HPI)
Pt here due to weakness via EMS. Pt stated that she woke up this and had some weakness. Pt stated that she was having some weakness as she was going to the bathroom. Pt stated that she called her  because she could not get up off the toilet.  called 46.

## 2023-08-07 LAB
ADENOVIRUS F 40/41 PCR: NEGATIVE
ALBUMIN SERPL-MCNC: 2.6 G/DL (ref 3.4–5)
ALBUMIN/GLOB SERPL: 0.5 {RATIO} (ref 1–2)
ALP LIVER SERPL-CCNC: 58 U/L
ALT SERPL-CCNC: 15 U/L
ANION GAP SERPL CALC-SCNC: 4 MMOL/L (ref 0–18)
AST SERPL-CCNC: 54 U/L (ref 15–37)
ASTROVIRUS PCR: NEGATIVE
BASOPHILS # BLD AUTO: 0.06 X10(3) UL (ref 0–0.2)
BASOPHILS NFR BLD AUTO: 0.4 %
BILIRUB SERPL-MCNC: 0.7 MG/DL (ref 0.1–2)
BUN BLD-MCNC: 7 MG/DL (ref 7–18)
C CAYETANENSIS DNA SPEC QL NAA+PROBE: NEGATIVE
C DIFF TOX B STL QL: NEGATIVE
CALCIUM BLD-MCNC: 8.6 MG/DL (ref 8.5–10.1)
CAMPY SP DNA.DIARRHEA STL QL NAA+PROBE: NEGATIVE
CHLORIDE SERPL-SCNC: 103 MMOL/L (ref 98–112)
CO2 SERPL-SCNC: 24 MMOL/L (ref 21–32)
CREAT BLD-MCNC: 0.65 MG/DL
CRYPTOSP DNA SPEC QL NAA+PROBE: NEGATIVE
EAEC PAA PLAS AGGR+AATA ST NAA+NON-PRB: NEGATIVE
EC STX1+STX2 + H7 FLIC SPEC NAA+PROBE: NEGATIVE
EGFRCR SERPLBLD CKD-EPI 2021: 91 ML/MIN/1.73M2 (ref 60–?)
ENTAMOEBA HISTOLYTICA PCR: NEGATIVE
EOSINOPHIL # BLD AUTO: 0.01 X10(3) UL (ref 0–0.7)
EOSINOPHIL NFR BLD AUTO: 0.1 %
EPEC EAE GENE STL QL NAA+NON-PROBE: NEGATIVE
ERYTHROCYTE [DISTWIDTH] IN BLOOD BY AUTOMATED COUNT: 13.9 %
ETEC LTA+ST1A+ST1B TOX ST NAA+NON-PROBE: NEGATIVE
GIARDIA LAMBLIA PCR: NEGATIVE
GLOBULIN PLAS-MCNC: 5 G/DL (ref 2.8–4.4)
GLUCOSE BLD-MCNC: 154 MG/DL (ref 70–99)
GLUCOSE BLD-MCNC: 163 MG/DL (ref 70–99)
GLUCOSE BLD-MCNC: 169 MG/DL (ref 70–99)
GLUCOSE BLD-MCNC: 79 MG/DL (ref 70–99)
GLUCOSE BLD-MCNC: 83 MG/DL (ref 70–99)
GLUCOSE BLD-MCNC: 84 MG/DL (ref 70–99)
HCT VFR BLD AUTO: 38.8 %
HGB BLD-MCNC: 12.9 G/DL
IMM GRANULOCYTES # BLD AUTO: 0.08 X10(3) UL (ref 0–1)
IMM GRANULOCYTES NFR BLD: 0.5 %
LYMPHOCYTES # BLD AUTO: 2.29 X10(3) UL (ref 1–4)
LYMPHOCYTES NFR BLD AUTO: 13.6 %
MAGNESIUM SERPL-MCNC: 1.6 MG/DL (ref 1.6–2.6)
MCH RBC QN AUTO: 30.5 PG (ref 26–34)
MCHC RBC AUTO-ENTMCNC: 33.2 G/DL (ref 31–37)
MCV RBC AUTO: 91.7 FL
MONOCYTES # BLD AUTO: 0.81 X10(3) UL (ref 0.1–1)
MONOCYTES NFR BLD AUTO: 4.8 %
NEUTROPHILS # BLD AUTO: 13.61 X10 (3) UL (ref 1.5–7.7)
NEUTROPHILS # BLD AUTO: 13.61 X10(3) UL (ref 1.5–7.7)
NEUTROPHILS NFR BLD AUTO: 80.6 %
NOROVIRUS GI/GII PCR: NEGATIVE
OSMOLALITY SERPL CALC.SUM OF ELEC: 269 MOSM/KG (ref 275–295)
P SHIGELLOIDES DNA STL QL NAA+PROBE: NEGATIVE
PLATELET # BLD AUTO: 327 10(3)UL (ref 150–450)
POTASSIUM SERPL-SCNC: 3.1 MMOL/L (ref 3.5–5.1)
PROCALCITONIN SERPL-MCNC: 0.2 NG/ML (ref ?–0.16)
PROT SERPL-MCNC: 7.6 G/DL (ref 6.4–8.2)
RBC # BLD AUTO: 4.23 X10(6)UL
ROTAVIRUS A PCR: NEGATIVE
SALMONELLA DNA SPEC QL NAA+PROBE: NEGATIVE
SAPOVIRUS PCR: NEGATIVE
SHIGELLA SP+EIEC IPAH ST NAA+NON-PROBE: NEGATIVE
SODIUM SERPL-SCNC: 131 MMOL/L (ref 136–145)
V CHOLERAE DNA SPEC QL NAA+PROBE: NEGATIVE
VIBRIO DNA SPEC NAA+PROBE: NEGATIVE
WBC # BLD AUTO: 16.9 X10(3) UL (ref 4–11)
YERSINIA DNA SPEC NAA+PROBE: NEGATIVE

## 2023-08-07 PROCEDURE — 87046 STOOL CULTR AEROBIC BACT EA: CPT | Performed by: EMERGENCY MEDICINE

## 2023-08-07 PROCEDURE — 87427 SHIGA-LIKE TOXIN AG IA: CPT | Performed by: EMERGENCY MEDICINE

## 2023-08-07 PROCEDURE — 87493 C DIFF AMPLIFIED PROBE: CPT | Performed by: EMERGENCY MEDICINE

## 2023-08-07 PROCEDURE — 80053 COMPREHEN METABOLIC PANEL: CPT | Performed by: INTERNAL MEDICINE

## 2023-08-07 PROCEDURE — 83735 ASSAY OF MAGNESIUM: CPT | Performed by: INTERNAL MEDICINE

## 2023-08-07 PROCEDURE — 87507 IADNA-DNA/RNA PROBE TQ 12-25: CPT | Performed by: INTERNAL MEDICINE

## 2023-08-07 PROCEDURE — 87045 FECES CULTURE AEROBIC BACT: CPT | Performed by: EMERGENCY MEDICINE

## 2023-08-07 PROCEDURE — 85025 COMPLETE CBC W/AUTO DIFF WBC: CPT | Performed by: INTERNAL MEDICINE

## 2023-08-07 PROCEDURE — 84132 ASSAY OF SERUM POTASSIUM: CPT | Performed by: INTERNAL MEDICINE

## 2023-08-07 PROCEDURE — 97116 GAIT TRAINING THERAPY: CPT

## 2023-08-07 PROCEDURE — 82962 GLUCOSE BLOOD TEST: CPT

## 2023-08-07 PROCEDURE — 97162 PT EVAL MOD COMPLEX 30 MIN: CPT

## 2023-08-07 RX ORDER — POTASSIUM CHLORIDE 20 MEQ/1
40 TABLET, EXTENDED RELEASE ORAL ONCE
Status: COMPLETED | OUTPATIENT
Start: 2023-08-07 | End: 2023-08-07

## 2023-08-07 RX ORDER — DEXTROSE MONOHYDRATE 25 G/50ML
50 INJECTION, SOLUTION INTRAVENOUS
Status: DISCONTINUED | OUTPATIENT
Start: 2023-08-07 | End: 2023-08-08

## 2023-08-07 RX ORDER — LEVOTHYROXINE SODIUM 0.12 MG/1
125 TABLET ORAL EVERY MORNING
Status: DISCONTINUED | OUTPATIENT
Start: 2023-08-07 | End: 2023-08-08

## 2023-08-07 RX ORDER — TRAMADOL HYDROCHLORIDE 50 MG/1
50 TABLET ORAL EVERY 8 HOURS PRN
Status: DISCONTINUED | OUTPATIENT
Start: 2023-08-07 | End: 2023-08-08

## 2023-08-07 RX ORDER — NICOTINE POLACRILEX 4 MG
15 LOZENGE BUCCAL
Status: DISCONTINUED | OUTPATIENT
Start: 2023-08-07 | End: 2023-08-08

## 2023-08-07 RX ORDER — METOPROLOL SUCCINATE 50 MG/1
50 TABLET, EXTENDED RELEASE ORAL DAILY
Status: DISCONTINUED | OUTPATIENT
Start: 2023-08-07 | End: 2023-08-07

## 2023-08-07 RX ORDER — NICOTINE POLACRILEX 4 MG
30 LOZENGE BUCCAL
Status: DISCONTINUED | OUTPATIENT
Start: 2023-08-07 | End: 2023-08-08

## 2023-08-07 RX ORDER — MAGNESIUM SULFATE HEPTAHYDRATE 40 MG/ML
2 INJECTION, SOLUTION INTRAVENOUS ONCE
Status: COMPLETED | OUTPATIENT
Start: 2023-08-07 | End: 2023-08-07

## 2023-08-07 RX ORDER — HYDRALAZINE HYDROCHLORIDE 20 MG/ML
10 INJECTION INTRAMUSCULAR; INTRAVENOUS EVERY 6 HOURS PRN
Status: DISCONTINUED | OUTPATIENT
Start: 2023-08-07 | End: 2023-08-08

## 2023-08-07 RX ORDER — DEXTROSE AND SODIUM CHLORIDE 5; .45 G/100ML; G/100ML
INJECTION, SOLUTION INTRAVENOUS CONTINUOUS
Status: DISCONTINUED | OUTPATIENT
Start: 2023-08-07 | End: 2023-08-08

## 2023-08-07 NOTE — PLAN OF CARE
Problem: Pancolitis   Data: Ax03-4. Telemetry- sinus rhythm. , on room air. Tmax 100.3. Regular breathing. Verbalized knee pain. Prn acetaminophen given. April Borer / briefed. C. Diff an shigatoxin sent to lab. NPO. IVF Dextrose 5% 0.45NS @ 50 ml/hr. Accu checks. Up with 1 assist. Bed alarm. @ 2037: BG 69. MD paged. IV dextrose ordered and given. Recheck blood sugar Q2 hrs for 2 times.   Intervention: Potassium replacement, IVF, heparin  Education: Medications, call light   Response: cooperative with care    Problem: Diabetes/Glucose Control  Goal: Glucose maintained within prescribed range  Description: INTERVENTIONS:  - Monitor Blood Glucose as ordered  - Assess for signs and symptoms of hyperglycemia and hypoglycemia  - Administer ordered medications to maintain glucose within target range  - Assess barriers to adequate nutritional intake and initiate nutrition consult as needed  - Instruct patient on self management of diabetes  Outcome: Progressing     Problem: Patient/Family Goals  Goal: Patient/Family Long Term Goal  Description: Patient's Long Term Goal: Discharge to home     Interventions:  - Follow plan of care   - See additional Care Plan goals for specific interventions  Outcome: Progressing  Goal: Patient/Family Short Term Goal  Description: Patient's Short Term Goal:   8/6 noc: improve BG    Interventions:   - IV dextrose  - IVF  - accu checks   - See additional Care Plan goals for specific interventions  Outcome: Progressing

## 2023-08-07 NOTE — PLAN OF CARE
AO x 4, on room air satting > 90%, on tele NSR/ST, electrolyte non cardiac protocol with magnesium and potassium replacement this shift, last BM was 8/7, has a pure wick in place, on a clear liquid diet tolerating well, to ADAT, moving to full liquid in the AM, given tylenol and hydralazine today, getting IVF D5 0.45NS @ 50mL/ hr, IV zosyn, up x 1 with walker, PT/OT worked with patient today see note, L arm precautions in place, call light within reach, bed in low locked position no further needs this shift.      Problem: Patient/Family Goals  Goal: Patient/Family Long Term Goal  Description: Patient's Long Term Goal: Discharge to home     Interventions:  - Follow plan of care   - See additional Care Plan goals for specific interventions  Outcome: Progressing  Goal: Patient/Family Short Term Goal  Description: Patient's Short Term Goal:   8/6 noc: improve BG    Interventions:   - IV dextrose  - IVF  - accu checks   - See additional Care Plan goals for specific interventions  Outcome: Progressing     Problem: Diabetes/Glucose Control  Goal: Glucose maintained within prescribed range  Description: INTERVENTIONS:  - Monitor Blood Glucose as ordered  - Assess for signs and symptoms of hyperglycemia and hypoglycemia  - Administer ordered medications to maintain glucose within target range  - Assess barriers to adequate nutritional intake and initiate nutrition consult as needed  - Instruct patient on self management of diabetes  Outcome: Progressing

## 2023-08-07 NOTE — CM/SW NOTE
Department  notified of request for yuriy ANDERSEN referrals started. Assigned CM/SW to follow up with pt/family on further discharge planning.       Elisha Reynolds  [facility]

## 2023-08-07 NOTE — DIETARY NOTE
[facility]     Pt chart reviewed for elevated A1C of 11.5%. Noted level drawn >3 months ago on 10/27/2021. Consider rechecking level during admit. Will follow up as appropriate for full nutrition assessment. Please consult if patient status changes or nutrition issues arise.      Tori Will MA, RD/LD  Clinical Dietitian

## 2023-08-07 NOTE — OCCUPATIONAL THERAPY NOTE
Att'd this PM. Pt declining this day due to fatigue and feeling \"Blah. \" Will re-attempt at later date.

## 2023-08-07 NOTE — PROGRESS NOTES
NURSING ADMISSION NOTE      Patient admitted via Cart  Oriented to room. Safety precautions initiated. Bed in low position. Call light in reach. Assumed care at 1630, pt a/ox4. RA. Tele, SR. NPO. GI consulted in the ED. IVF 100ml/hr. Pt updated on poc. Call light in reach. Admission navigator completed. All needs are met at this time.

## 2023-08-07 NOTE — PHYSICAL THERAPY NOTE
PHYSICAL THERAPY EVALUATION - INPATIENT     Room Number: 523/523-A  Evaluation Date: 8/7/2023  Type of Evaluation: Initial  Physician Order: PT Eval and Treat    Presenting Problem: sepsis, weakness     Reason for Therapy: Mobility Dysfunction and Discharge Planning    History related to current admission: Patient is a 68year old female admitted on 8/6/2023 from home for sepsis. ASSESSMENT   In this PT evaluation, the patient presents with the following impairments decreased activity tolerance. These impairments and comorbidities manifest themselves as functional limitations in independent bed mobility, transfers, and gait. The patient is below baseline and would benefit from skilled inpatient PT to address the above deficits to assist patient in returning to prior to level of function. Functional outcome measures completed include [facility]. The AM-PAC '6-Clicks' Inpatient Basic Mobility Short Form was completed and this patient is demonstrating a Approx Degree of Impairment: 54.16%  degree of impairment in mobility. Research supports that patients with this level of impairment may benefit from GANESH. DISCHARGE RECOMMENDATIONS  PT Discharge Recommendations: Sub-acute rehabilitation    PLAN  PT Treatment Plan: Bed mobility; Body mechanics; Endurance; Energy conservation;Patient education; Family education;Gait training;Range of motion;Stair training;Transfer training;Balance training  Rehab Potential : Good  Frequency (Obs):  (2-3x/day)  Number of Visits to Meet Established Goals: 5      CURRENT GOALS    Goal #1 Patient is able to demonstrate supine - sit EOB @ level: modified independent     Goal #2 Patient is able to demonstrate transfers EOB to/from Chair/Wheelchair at assistance level: modified independent     Goal #3 Patient is able to ambulate 50 feet with assist device:  least restrictive device  at assistance level: supervision     Goal #4 Patient will negotiate 1 flight of stairs with supervision to ensure safety at home. Goal #5    Goal #6    Goal Comments: Goals established on 2023    HOME SITUATION  Type of Home: House   Home Layout: Two level                Lives With: Spouse             Prior Level of [place]: Pt reports ambulating without assistive device PTA, denies falls. Pt and spouse report spouse falls often. Pt ambulates with crouched, shuffled gait at baseline. SUBJECTIVE  \"What do you think honey, like a 5? \"      OBJECTIVE  Precautions: Bed/chair alarm  Fall Risk: High fall risk    WEIGHT BEARING RESTRICTION                   PAIN ASSESSMENT  Ratin  Location: R low back  Management Techniques: Activity promotion; Body mechanics; Relaxation;Repositioning    COGNITION  Following Commands:  follows one step commands with increased time    RANGE OF MOTION AND STRENGTH ASSESSMENT  Upper extremity ROM and strength are within functional limits     Lower extremity ROM is within functional limits     Lower extremity strength is within functional limits       BALANCE  Static Sitting: Good  Dynamic Sitting: Poor +  Static Standing: Poor +  Dynamic Standing: Poor +    ADDITIONAL TESTS                                    ACTIVITY TOLERANCE                         O2 WALK       NEUROLOGICAL FINDINGS                        AM-PAC '6-Clicks' INPATIENT SHORT FORM - BASIC MOBILITY  How much difficulty does the patient currently have. .. Patient Difficulty: Turning over in bed (including adjusting bedclothes, sheets and blankets)?: A Little   Patient Difficulty: Sitting down on and standing up from a chair with arms (e.g., wheelchair, bedside commode, etc.): A Little   Patient Difficulty: Moving from lying on back to sitting on the side of the bed?: A Little   How much help from another person does the patient currently need. ..    Help from Another: Moving to and from a bed to a chair (including a wheelchair)?: A Little   Help from Another: Need to walk in hospital room?: A Little Help from Another: Climbing 3-5 steps with a railing?: Total       AM-PAC Score:  Raw Score: 16   Approx Degree of Impairment: 54.16%   Standardized Score (AM-PAC Scale): 40.78   CMS Modifier (G-Code): CK    FUNCTIONAL ABILITY STATUS  Gait Assessment   Functional Mobility/Gait Assessment  Gait Assistance: Minimum assistance  Distance (ft): 20  Assistive Device: Rolling walker  Pattern: Shuffle    Skilled Therapy Provided     Bed Mobility:  Rolling: min A  Supine to sit: min A with LOB to the L   Sit to supine: NT     Transfer Mobility:  Sit to stand: min A to RW with vc's for hand placement   Stand to sit: min A from RW  Gait = min A with increased time to complete. Therapist's Comments: Pt encouraged OOB activity. D/w pt re: dc recs, pt in agreement at this time. Rec continued use of RW when mobilizing. PCT present at end of sessions to get vitals. Exercise/Education Provided:  Bed mobility  Body mechanics  Functional activity tolerated  Gait training  Posture  Transfer training    Patient End of Session: Up in chair;Needs met;Call light within reach;RN aware of session/findings; All patient questions and concerns addressed; Alarm set; Family present; Discussed recommendations with /      Patient Evaluation Complexity Level:  History Moderate - 1 or 2 personal factors and/or co-morbidities   Examination of body systems Moderate - addressing a total of 3 or more elements   Clinical Presentation Moderate - Evolving   Clinical Decision Making Moderate - Evolving       PT Session Time: 25 minutes

## 2023-08-07 NOTE — CM/SW NOTE
Pt admitted for sepsis. Pt lives at home with her  Al. Pt has +urine cultures. PT saw pt and they are recommending GANESH for pt. Spoke with pt who is agreeable to GANESH. GANESH referrals sent by [facility] in [address] [place] [address]. PASRR completed and uploaded in [address] [place] [address]. SW to f/u with accepting GANESH facilities and pt's GANESH choice.

## 2023-08-08 VITALS
HEART RATE: 83 BPM | OXYGEN SATURATION: 95 % | RESPIRATION RATE: 18 BRPM | BODY MASS INDEX: 27 KG/M2 | TEMPERATURE: 98 F | DIASTOLIC BLOOD PRESSURE: 61 MMHG | WEIGHT: 151 LBS | SYSTOLIC BLOOD PRESSURE: 153 MMHG

## 2023-08-08 LAB
ANION GAP SERPL CALC-SCNC: 4 MMOL/L (ref 0–18)
BUN BLD-MCNC: 5 MG/DL (ref 7–18)
CALCIUM BLD-MCNC: 8.7 MG/DL (ref 8.5–10.1)
CHLORIDE SERPL-SCNC: 102 MMOL/L (ref 98–112)
CO2 SERPL-SCNC: 25 MMOL/L (ref 21–32)
CREAT BLD-MCNC: 0.71 MG/DL
EGFRCR SERPLBLD CKD-EPI 2021: 88 ML/MIN/1.73M2 (ref 60–?)
ERYTHROCYTE [DISTWIDTH] IN BLOOD BY AUTOMATED COUNT: 13.4 %
GLUCOSE BLD-MCNC: 145 MG/DL (ref 70–99)
GLUCOSE BLD-MCNC: 148 MG/DL (ref 70–99)
GLUCOSE BLD-MCNC: 262 MG/DL (ref 70–99)
GLUCOSE BLD-MCNC: 264 MG/DL (ref 70–99)
HCT VFR BLD AUTO: 35.8 %
HGB BLD-MCNC: 12.1 G/DL
MAGNESIUM SERPL-MCNC: 2 MG/DL (ref 1.6–2.6)
MCH RBC QN AUTO: 29.8 PG (ref 26–34)
MCHC RBC AUTO-ENTMCNC: 33.8 G/DL (ref 31–37)
MCV RBC AUTO: 88.2 FL
OSMOLALITY SERPL CALC.SUM OF ELEC: 272 MOSM/KG (ref 275–295)
PLATELET # BLD AUTO: 303 10(3)UL (ref 150–450)
POTASSIUM SERPL-SCNC: 3.2 MMOL/L (ref 3.5–5.1)
POTASSIUM SERPL-SCNC: 3.2 MMOL/L (ref 3.5–5.1)
RBC # BLD AUTO: 4.06 X10(6)UL
SODIUM SERPL-SCNC: 131 MMOL/L (ref 136–145)
WBC # BLD AUTO: 13 X10(3) UL (ref 4–11)

## 2023-08-08 PROCEDURE — 82962 GLUCOSE BLOOD TEST: CPT

## 2023-08-08 PROCEDURE — 83735 ASSAY OF MAGNESIUM: CPT | Performed by: INTERNAL MEDICINE

## 2023-08-08 PROCEDURE — 80048 BASIC METABOLIC PNL TOTAL CA: CPT | Performed by: INTERNAL MEDICINE

## 2023-08-08 PROCEDURE — 84132 ASSAY OF SERUM POTASSIUM: CPT | Performed by: INTERNAL MEDICINE

## 2023-08-08 PROCEDURE — 85027 COMPLETE CBC AUTOMATED: CPT | Performed by: INTERNAL MEDICINE

## 2023-08-08 RX ORDER — INSULIN GLARGINE 300 U/ML
60 INJECTION, SOLUTION SUBCUTANEOUS EVERY EVENING
COMMUNITY

## 2023-08-08 RX ORDER — CIPROFLOXACIN 500 MG/1
500 TABLET, FILM COATED ORAL 2 TIMES DAILY
Qty: 14 TABLET | Refills: 0 | Status: SHIPPED | OUTPATIENT
Start: 2023-08-08 | End: 2023-08-15

## 2023-08-08 RX ORDER — POTASSIUM CHLORIDE 20 MEQ/1
40 TABLET, EXTENDED RELEASE ORAL ONCE
Status: COMPLETED | OUTPATIENT
Start: 2023-08-08 | End: 2023-08-08

## 2023-08-08 RX ORDER — METRONIDAZOLE 500 MG/1
500 TABLET ORAL 3 TIMES DAILY
Qty: 21 TABLET | Refills: 0 | Status: SHIPPED | OUTPATIENT
Start: 2023-08-08 | End: 2023-08-15

## 2023-08-08 NOTE — CM/SW NOTE
Met with pt's  Al who says pt has some dementia and probalby was not understanding what the recommendation of [facility] meant. Discussed GANESH with . He says he does not want pt to go to [facility]. He wants to take pt home and assess what pt needs once she gets home. Offered HH and he politely declined. Gave  my business card and asked him to call me if he changes his mind. Pt will most likely dc home today.

## 2023-08-08 NOTE — PLAN OF CARE
Pt A&OX4. L arm precautions. On RA sating WNL. . On tele. NSR/ST. Heparin. No c/o pain at this time. IV ABX. IVF. QID accuchecks. Up x1 walker. Briefed. Continent. Pt updated on plan of care and verbalized understanding. Call light within reach. All needs met at this time. Safety precautions in place. Will follow.      Problem: Diabetes/Glucose Control  Goal: Glucose maintained within prescribed range  Description: INTERVENTIONS:  - Monitor Blood Glucose as ordered  - Assess for signs and symptoms of hyperglycemia and hypoglycemia  - Administer ordered medications to maintain glucose within target range  - Assess barriers to adequate nutritional intake and initiate nutrition consult as needed  - Instruct patient on self management of diabetes  Outcome: Progressing     Problem: Patient/Family Goals  Goal: Patient/Family Long Term Goal  Description: Patient's Long Term Goal: Discharge to home     Interventions:  - Follow plan of care   - See additional Care Plan goals for specific interventions  Outcome: Progressing  Goal: Patient/Family Short Term Goal  Description: Patient's Short Term Goal:   8/6 noc: improve BG  8/7 NOC: Sleep, remain comfortable    Interventions:   - IV dextrose  - IVF  - accu checks   - See additional Care Plan goals for specific interventions  Outcome: Progressing

## 2023-08-08 NOTE — PLAN OF CARE
NURSING DISCHARGE NOTE    Discharged Home via Wheelchair. Accompanied by Spouse and Support staff  Belongings Taken by patient/family. Discharge instructions explained to patient and  at bedside. IV removed. Patient's belongings taken by patient. No further questions at this time.      Problem: Diabetes/Glucose Control  Goal: Glucose maintained within prescribed range  Description: INTERVENTIONS:  - Monitor Blood Glucose as ordered  - Assess for signs and symptoms of hyperglycemia and hypoglycemia  - Administer ordered medications to maintain glucose within target range  - Assess barriers to adequate nutritional intake and initiate nutrition consult as needed  - Instruct patient on self management of diabetes  Outcome: Progressing     Problem: Patient/Family Goals  Goal: Patient/Family Long Term Goal  Description: Patient's Long Term Goal: Discharge to home     Interventions:  - Follow plan of care   - See additional Care Plan goals for specific interventions  Outcome: Progressing  Goal: Patient/Family Short Term Goal  Description: Patient's Short Term Goal:   8/6 noc: improve BG  8/7 NOC: Sleep, remain comfortable  8/8 AM: tolerate diet      Interventions:   - IV dextrose  - IVF  - accu checks   - See additional Care Plan goals for specific interventions  Outcome: Progressing

## 2023-08-09 ENCOUNTER — PATIENT OUTREACH (OUTPATIENT)
Dept: CASE MANAGEMENT | Age: 78
End: 2023-08-09

## 2023-08-09 NOTE — PROGRESS NOTES
Hospital follow up, first attempt. (Dc 8/8)    Shelton Pete MD  Specialty: Internal Medicine  Follow up  [address] [facility] [address] [place] [address]  [phone]  Wednesday 8/16 @11    Lin Banks MD  Specialty: [address]  [address] [address]  [phone]  5 weeks    Patient will schedule appointments. Confirmed with patient's . Closing encounter.

## 2023-09-23 ENCOUNTER — HOSPITAL ENCOUNTER (EMERGENCY)
Facility: HOSPITAL | Age: 78
Discharge: HOME | End: 2023-09-24
Attending: EMERGENCY MEDICINE
Payer: MEDICARE

## 2023-09-23 DIAGNOSIS — E87.6 HYPOKALEMIA: ICD-10-CM

## 2023-09-23 DIAGNOSIS — E16.2 HYPOGLYCEMIA: Primary | ICD-10-CM

## 2023-09-23 DIAGNOSIS — E87.1 HYPONATREMIA: ICD-10-CM

## 2023-09-23 LAB
ANION GAP SERPL CALC-SCNC: 5 MMOL/L (ref 0–18)
BASOPHILS # BLD AUTO: 0.03 X10(3) UL (ref 0–0.2)
BASOPHILS NFR BLD AUTO: 0.2 %
BUN BLD-MCNC: 14 MG/DL (ref 7–18)
CALCIUM BLD-MCNC: 9.3 MG/DL (ref 8.5–10.1)
CHLORIDE SERPL-SCNC: 90 MMOL/L (ref 98–112)
CO2 SERPL-SCNC: 31 MMOL/L (ref 21–32)
CREAT BLD-MCNC: 0.68 MG/DL
EGFRCR SERPLBLD CKD-EPI 2021: 90 ML/MIN/1.73M2 (ref 60–?)
EOSINOPHIL # BLD AUTO: 0.08 X10(3) UL (ref 0–0.7)
EOSINOPHIL NFR BLD AUTO: 0.6 %
ERYTHROCYTE [DISTWIDTH] IN BLOOD BY AUTOMATED COUNT: 12.6 %
GLUCOSE BLD-MCNC: 163 MG/DL (ref 70–99)
GLUCOSE BLD-MCNC: 46 MG/DL (ref 70–99)
GLUCOSE BLD-MCNC: 60 MG/DL (ref 70–99)
GLUCOSE BLD-MCNC: 63 MG/DL (ref 70–99)
HCT VFR BLD AUTO: 39.4 %
HGB BLD-MCNC: 13.6 G/DL
IMM GRANULOCYTES # BLD AUTO: 0.07 X10(3) UL (ref 0–1)
IMM GRANULOCYTES NFR BLD: 0.5 %
LYMPHOCYTES # BLD AUTO: 2.98 X10(3) UL (ref 1–4)
LYMPHOCYTES NFR BLD AUTO: 20.7 %
MCH RBC QN AUTO: 30 PG (ref 26–34)
MCHC RBC AUTO-ENTMCNC: 34.5 G/DL (ref 31–37)
MCV RBC AUTO: 86.8 FL
MONOCYTES # BLD AUTO: 1.27 X10(3) UL (ref 0.1–1)
MONOCYTES NFR BLD AUTO: 8.8 %
NEUTROPHILS # BLD AUTO: 9.96 X10 (3) UL (ref 1.5–7.7)
NEUTROPHILS # BLD AUTO: 9.96 X10(3) UL (ref 1.5–7.7)
NEUTROPHILS NFR BLD AUTO: 69.2 %
OSMOLALITY SERPL CALC.SUM OF ELEC: 260 MOSM/KG (ref 275–295)
PLATELET # BLD AUTO: 348 10(3)UL (ref 150–450)
POTASSIUM SERPL-SCNC: 2.6 MMOL/L (ref 3.5–5.1)
RBC # BLD AUTO: 4.54 X10(6)UL
SODIUM SERPL-SCNC: 126 MMOL/L (ref 136–145)
WBC # BLD AUTO: 14.4 X10(3) UL (ref 4–11)

## 2023-09-23 PROCEDURE — 99284 EMERGENCY DEPT VISIT MOD MDM: CPT

## 2023-09-23 PROCEDURE — 96374 THER/PROPH/DIAG INJ IV PUSH: CPT

## 2023-09-23 PROCEDURE — 80048 BASIC METABOLIC PNL TOTAL CA: CPT | Performed by: EMERGENCY MEDICINE

## 2023-09-23 PROCEDURE — 85025 COMPLETE CBC W/AUTO DIFF WBC: CPT | Performed by: EMERGENCY MEDICINE

## 2023-09-23 PROCEDURE — 99285 EMERGENCY DEPT VISIT HI MDM: CPT

## 2023-09-23 PROCEDURE — 80076 HEPATIC FUNCTION PANEL: CPT | Performed by: EMERGENCY MEDICINE

## 2023-09-23 PROCEDURE — 82962 GLUCOSE BLOOD TEST: CPT

## 2023-09-23 RX ORDER — NICOTINE POLACRILEX 4 MG
15 LOZENGE BUCCAL
Status: DISCONTINUED | OUTPATIENT
Start: 2023-09-23 | End: 2023-09-24

## 2023-09-23 RX ORDER — NICOTINE POLACRILEX 4 MG
30 LOZENGE BUCCAL
Status: DISCONTINUED | OUTPATIENT
Start: 2023-09-23 | End: 2023-09-24

## 2023-09-23 RX ORDER — POTASSIUM CHLORIDE 20 MEQ/1
40 TABLET, EXTENDED RELEASE ORAL ONCE
Status: COMPLETED | OUTPATIENT
Start: 2023-09-23 | End: 2023-09-24

## 2023-09-23 RX ORDER — DEXTROSE MONOHYDRATE 25 G/50ML
INJECTION, SOLUTION INTRAVENOUS
Status: COMPLETED
Start: 2023-09-23 | End: 2023-09-23

## 2023-09-23 RX ORDER — DEXTROSE MONOHYDRATE 25 G/50ML
50 INJECTION, SOLUTION INTRAVENOUS
Status: DISCONTINUED | OUTPATIENT
Start: 2023-09-23 | End: 2023-09-24

## 2023-09-24 VITALS
HEIGHT: 63 IN | DIASTOLIC BLOOD PRESSURE: 77 MMHG | HEART RATE: 72 BPM | TEMPERATURE: 98 F | OXYGEN SATURATION: 96 % | SYSTOLIC BLOOD PRESSURE: 163 MMHG | BODY MASS INDEX: 24.8 KG/M2 | RESPIRATION RATE: 16 BRPM | WEIGHT: 140 LBS

## 2023-09-24 LAB
ALBUMIN SERPL-MCNC: 2.8 G/DL (ref 3.4–5)
ALP LIVER SERPL-CCNC: 77 U/L
ALT SERPL-CCNC: 13 U/L
AST SERPL-CCNC: 35 U/L (ref 15–37)
BILIRUB DIRECT SERPL-MCNC: <0.1 MG/DL (ref 0–0.2)
BILIRUB SERPL-MCNC: 0.2 MG/DL (ref 0.1–2)
GLUCOSE BLD-MCNC: 125 MG/DL (ref 70–99)
PROT SERPL-MCNC: 8 G/DL (ref 6.4–8.2)

## 2023-09-24 PROCEDURE — 82962 GLUCOSE BLOOD TEST: CPT

## 2023-09-24 RX ORDER — POTASSIUM CHLORIDE 750 MG/1
20 TABLET, FILM COATED, EXTENDED RELEASE ORAL 2 TIMES DAILY
Qty: 20 TABLET | Refills: 0 | Status: SHIPPED | OUTPATIENT
Start: 2023-09-24 | End: 2023-09-29

## 2023-09-24 NOTE — DISCHARGE INSTRUCTIONS
-Decrease your insulin dose from 60 units nightly to 40 units nightly. Call your endocrinologist on Monday for further guidance.    -Be sure to maintain adequate Caloric intake and adequate nutrition. Consider using nutritional supplements such as Ensure and boost.    -Prescription potassium tablets was sent to the pharmacy, be sure to pick this up and take them. -Follow-up with home health care regarding your referral for physical therapy.

## 2023-09-24 NOTE — ED QUICK NOTES
MD at bedside. Pt alert and oriented. BG 46 on repeat accucheck. D50 IVP per EDMD. MD wants pt to continue eating sandwich and pretzels.

## 2023-09-24 NOTE — ED INITIAL ASSESSMENT (HPI)
Pt bib EMS c/o hypoglycemia, BG was 21 for EMS, received 100mL of D10. EMS reports slurring when they arrived to pt house. Slurring is resolved on arrival. Pt A&Ox4. Pt daughter reports pt gave herself 60 units of insulin glargine at approximately 2030. BG on arrival is 63.

## 2024-05-03 ENCOUNTER — HOSPITAL ENCOUNTER (EMERGENCY)
Facility: HOSPITAL | Age: 79
Discharge: HOME | End: 2024-05-03
Attending: EMERGENCY MEDICINE
Payer: MEDICARE

## 2024-05-03 ENCOUNTER — APPOINTMENT (OUTPATIENT)
Dept: ULTRASOUND IMAGING | Facility: HOSPITAL | Age: 79
End: 2024-05-03
Attending: PHYSICIAN ASSISTANT
Payer: MEDICARE

## 2024-05-03 ENCOUNTER — APPOINTMENT (OUTPATIENT)
Dept: GENERAL RADIOLOGY | Facility: HOSPITAL | Age: 79
End: 2024-05-03
Attending: PHYSICIAN ASSISTANT
Payer: MEDICARE

## 2024-05-03 ENCOUNTER — APPOINTMENT (OUTPATIENT)
Dept: GENERAL RADIOLOGY | Facility: HOSPITAL | Age: 79
End: 2024-05-03
Attending: EMERGENCY MEDICINE
Payer: MEDICARE

## 2024-05-03 VITALS
WEIGHT: 130 LBS | SYSTOLIC BLOOD PRESSURE: 152 MMHG | TEMPERATURE: 98 F | DIASTOLIC BLOOD PRESSURE: 73 MMHG | HEART RATE: 68 BPM | BODY MASS INDEX: 23 KG/M2 | RESPIRATION RATE: 16 BRPM | OXYGEN SATURATION: 98 %

## 2024-05-03 DIAGNOSIS — M19.90 OSTEOARTHRITIS, UNSPECIFIED OSTEOARTHRITIS TYPE, UNSPECIFIED SITE: ICD-10-CM

## 2024-05-03 DIAGNOSIS — M25.561 RECURRENT PAIN OF RIGHT KNEE: Primary | ICD-10-CM

## 2024-05-03 DIAGNOSIS — M25.551 RIGHT HIP PAIN: ICD-10-CM

## 2024-05-03 LAB
ALBUMIN SERPL-MCNC: 2.6 G/DL (ref 3.4–5)
ALBUMIN/GLOB SERPL: 0.5 {RATIO} (ref 1–2)
ALP LIVER SERPL-CCNC: 99 U/L
ALT SERPL-CCNC: 10 U/L
ANION GAP SERPL CALC-SCNC: 6 MMOL/L (ref 0–18)
AST SERPL-CCNC: 21 U/L (ref 15–37)
BASOPHILS # BLD AUTO: 0.06 X10(3) UL (ref 0–0.2)
BASOPHILS NFR BLD AUTO: 0.5 %
BILIRUB SERPL-MCNC: 0.5 MG/DL (ref 0.1–2)
BUN BLD-MCNC: 7 MG/DL (ref 9–23)
CALCIUM BLD-MCNC: 8.9 MG/DL (ref 8.5–10.1)
CHLORIDE SERPL-SCNC: 95 MMOL/L (ref 98–112)
CO2 SERPL-SCNC: 27 MMOL/L (ref 21–32)
CREAT BLD-MCNC: 0.68 MG/DL
EGFRCR SERPLBLD CKD-EPI 2021: 89 ML/MIN/1.73M2 (ref 60–?)
EOSINOPHIL # BLD AUTO: 0.38 X10(3) UL (ref 0–0.7)
EOSINOPHIL NFR BLD AUTO: 3.3 %
ERYTHROCYTE [DISTWIDTH] IN BLOOD BY AUTOMATED COUNT: 13.2 %
GLOBULIN PLAS-MCNC: 4.9 G/DL (ref 2.8–4.4)
GLUCOSE BLD-MCNC: 203 MG/DL (ref 70–99)
HCT VFR BLD AUTO: 34.9 %
HGB BLD-MCNC: 11.9 G/DL
IMM GRANULOCYTES # BLD AUTO: 0.05 X10(3) UL (ref 0–1)
IMM GRANULOCYTES NFR BLD: 0.4 %
LIPASE SERPL-CCNC: 21 U/L (ref 13–75)
LYMPHOCYTES # BLD AUTO: 2.91 X10(3) UL (ref 1–4)
LYMPHOCYTES NFR BLD AUTO: 25 %
MCH RBC QN AUTO: 30.6 PG (ref 26–34)
MCHC RBC AUTO-ENTMCNC: 34.1 G/DL (ref 31–37)
MCV RBC AUTO: 89.7 FL
MONOCYTES # BLD AUTO: 1.15 X10(3) UL (ref 0.1–1)
MONOCYTES NFR BLD AUTO: 9.9 %
NEUTROPHILS # BLD AUTO: 7.1 X10 (3) UL (ref 1.5–7.7)
NEUTROPHILS # BLD AUTO: 7.1 X10(3) UL (ref 1.5–7.7)
NEUTROPHILS NFR BLD AUTO: 60.9 %
OSMOLALITY SERPL CALC.SUM OF ELEC: 270 MOSM/KG (ref 275–295)
PLATELET # BLD AUTO: 427 10(3)UL (ref 150–450)
POTASSIUM SERPL-SCNC: 3.4 MMOL/L (ref 3.5–5.1)
PROT SERPL-MCNC: 7.5 G/DL (ref 6.4–8.2)
RBC # BLD AUTO: 3.89 X10(6)UL
SODIUM SERPL-SCNC: 128 MMOL/L (ref 136–145)
WBC # BLD AUTO: 11.7 X10(3) UL (ref 4–11)

## 2024-05-03 PROCEDURE — 73560 X-RAY EXAM OF KNEE 1 OR 2: CPT | Performed by: EMERGENCY MEDICINE

## 2024-05-03 PROCEDURE — 83690 ASSAY OF LIPASE: CPT | Performed by: PHYSICIAN ASSISTANT

## 2024-05-03 PROCEDURE — 99284 EMERGENCY DEPT VISIT MOD MDM: CPT

## 2024-05-03 PROCEDURE — 80053 COMPREHEN METABOLIC PANEL: CPT | Performed by: PHYSICIAN ASSISTANT

## 2024-05-03 PROCEDURE — 85025 COMPLETE CBC W/AUTO DIFF WBC: CPT | Performed by: PHYSICIAN ASSISTANT

## 2024-05-03 PROCEDURE — 99285 EMERGENCY DEPT VISIT HI MDM: CPT

## 2024-05-03 PROCEDURE — 93971 EXTREMITY STUDY: CPT | Performed by: PHYSICIAN ASSISTANT

## 2024-05-03 PROCEDURE — 73502 X-RAY EXAM HIP UNI 2-3 VIEWS: CPT | Performed by: PHYSICIAN ASSISTANT

## 2024-05-03 PROCEDURE — 96374 THER/PROPH/DIAG INJ IV PUSH: CPT

## 2024-05-03 RX ORDER — MELOXICAM 7.5 MG/1
7.5 TABLET ORAL DAILY
Qty: 30 TABLET | Refills: 0 | Status: SHIPPED | OUTPATIENT
Start: 2024-05-03 | End: 2024-06-02

## 2024-05-03 RX ORDER — KETOROLAC TROMETHAMINE 15 MG/ML
15 INJECTION, SOLUTION INTRAMUSCULAR; INTRAVENOUS ONCE
Status: COMPLETED | OUTPATIENT
Start: 2024-05-03 | End: 2024-05-03

## 2024-05-03 NOTE — ED PROVIDER NOTES
Patient Seen in: [facility] Emergency Department      History     Chief Complaint   Patient presents with    Leg or Foot Injury     Stated Complaint: rt leg pain    Subjective:   HPI    78-year-old female comes to the hospital complaint of having difficulty with pain by the area of her right leg greatest in her right knee and her right hip.  She says this been worsening over the last 1 week.  She is known to have severe arthritis in the area of her knee and at times have been told she would need a knee replacement but has not been done.  She is having worsening pain this week and having a lot of difficulty with any ambulation using her walker.  The  states that he is still left her to get to the bathroom and kitchen.  She denies any new injury.  She has no numbness or weakness.  She is denying any other specific complaints.    Objective:   Past Medical History:    Actinic keratosis    Basal cell carcinoma    Breast cancer (HCC)    Invasive Ductal Carcinoma    CAD (coronary artery disease)    Diabetes (HCC)    Essential hypertension    Hyperlipidemia    Hypothyroidism    Muscle weakness    Osteoarthritis    Personal history of antineoplastic chemotherapy    Rheumatoid arthritis involving multiple sites with positive rheumatoid factor (HCC)    Squamous cell carcinoma    Type 2 diabetes mellitus (HCC)              Past Surgical History:   Procedure Laterality Date    Cabg      Colonoscopy  6/2010    diverticulosis    Implant left  Nov. 2011    Reconstructive for mastectomy    Mastectomy left Left 9/21/10    Invasive Ductal CA    Other surgical history      basal cell removal    Removal of tunneled cva device, with subq port or pump, central or peripheral insertion  2/16/2012    Procedure: PORT-A-CATH REMOVAL;  Surgeon: Ozzy Ortiz MD;  [place] [facility]    Tonsillectomy                  Social History     Socioeconomic History    Marital status:    Tobacco Use    Smoking status:  Never    Smokeless tobacco: Never   Vaping Use    Vaping status: Never Used   Substance and Sexual Activity    Alcohol use: No    Drug use: No     Social Determinants of Health     Physical Activity: Insufficiently Active (2/25/2020)    [facility]    Exercise Vital Sign     Days of Exercise per Week: 3 days     Minutes of Exercise per Session: 30 min              Review of Systems    Positive for stated complaint: rt leg pain  Other systems are as noted in HPI.  Constitutional and vital signs reviewed.      All other systems reviewed and negative except as noted above.    Physical Exam     ED Triage Vitals [05/03/24 1223]   /76   Pulse 81   Resp 16   Temp 97.7 °F (36.5 °C)   Temp src Temporal   SpO2 99 %   O2 Device        Current:/73   Pulse 68   Temp 97.7 °F (36.5 °C) (Temporal)   Resp 16   Wt 59 kg   SpO2 98%   BMI 23.03 kg/m²         Physical Exam    HEENT : NCAT, EOMI, PEERL,  neck supple, no JVD, trachea midline, No LAD  Heart: S1S2 normal. No murmurs, regular rate and rhythm  Lungs: Clear to auscultation bilaterally  Abdomen: Soft nontender nondistended normal active bowel sounds without rebound, guarding or masses noted  Back nontender without CVA tenderness  Extremity no clubbing, cyanosis or edema noted.  Tenderness is noted over the right knee and right hip with range of motion spared but she has pain with range of motion and she is otherwise neurovascularly intact without erythema or warmth noted  Neuro: No focal deficits noted    All measures to prevent infection transmission during my interaction with the patient were taken.  The patient was already wearing droplet mask on my arrival to the room.  Personal protective equipment including a droplet mask as well as gloves were worn throughout the duration of my exam.  Hand washing was performed prior to and after the exam.  Stethoscope and equipment used during my examination was cleaned  with a super Sani cloth germicidal wipe following the exam.    ED Course     Labs Reviewed   COMP METABOLIC PANEL (14) - Abnormal; Notable for the following components:       Result Value    Glucose 203 (*)     Sodium 128 (*)     Potassium 3.4 (*)     Chloride 95 (*)     BUN 7 (*)     Calculated Osmolality 270 (*)     ALT 10 (*)     Albumin 2.6 (*)     Globulin  4.9 (*)     A/G Ratio 0.5 (*)     All other components within normal limits   CBC W/ DIFFERENTIAL - Abnormal; Notable for the following components:    WBC 11.7 (*)     HGB 11.9 (*)     HCT 34.9 (*)     Monocyte Absolute 1.15 (*)     All other components within normal limits   LIPASE - Normal   CBC WITH DIFFERENTIAL WITH PLATELET    Narrative:     The following orders were created for panel order CBC With Differential With Platelet.  Procedure                               Abnormality         Status                     ---------                               -----------         ------                     CBC W/ DIFFERENTIAL[[id number]]          Abnormal            Final result                 Please view results for these tests on the individual orders.   RAINBOW DRAW LAVENDER   RAINBOW DRAW LIGHT GREEN   RAINBOW DRAW BLUE          ED Course as of 05/03/24 1432  ------------------------------------------------------------  Time: 05/03 1429  Comment: While here the patient had an ultrasound done of her lower extremity that I interpreted showing no DVT.  I reviewed the radiology report as well.  She had an x-ray of her knee and hip done which did show some arthritic changes.  Sodium is 128.  White count is 11.7 thousand.  Lipase 21.  She was given Toradol and has improved and was able to ambulate while here.     XR KNEE (1 OR 2 VIEWS), RIGHT (CPT=73560)    Result Date: 5/3/2024  PROCEDURE:  XR KNEE (1 OR 2 VIEWS), RIGHT (CPT=73560)  COMPARISON:  None.  INDICATIONS:  rt leg pain  PATIENT STATED HISTORY: (As transcribed by Technologist)  Patient offered no  additional history at this time.              CONCLUSION:      Negative for fracture, dislocation, deformity or other acute bony abnormality. Osteoarthritic changes are noted, mild degree.  Anterior soft tissue swelling.  Joint effusion.  Extensive arterial calcifications.   LOCATION:  [id number]   Dictated by ([facility]): Royal Weinberg MD on 5/03/2024 at 2:10 PM     Finalized by (CST): Royal Weinberg MD on 5/03/2024 at 2:11 PM       XR HIP W OR WO PELVIS 2 OR 3 VIEWS, RIGHT (CPT=73502)    Result Date: 5/3/2024  PROCEDURE:  XR HIP W OR WO PELVIS 2 OR 3 VIEWS, RIGHT ( CPT=73502)  TECHNIQUE:  Unilateral 2 to 3 views of the hip and pelvis if performed.  COMPARISON:  None.  INDICATIONS:  rt leg pain  PATIENT STATED HISTORY: (As transcribed by Technologist)  Patient offered no additional history at this time.    FINDINGS:  Negative for fracture, dislocation, deformity or other acute bony abnormality. Osteoarthritic changes are noted, moderate degree.  No acute soft tissue abnormalities.  Extensive arterial calcification present.             CONCLUSION:  Moderate osteoarthritic changes are present. No acute fracture or other acute bony process.  LOCATION:  [id number]   Dictated by ([facility]): Royal Weinberg MD on 5/03/2024 at 2:09 PM     Finalized by ([facility]): Royal Weinberg MD on 5/03/2024 at 2:10 PM       US VENOUS DOPPLER LEG RIGHT - DIAG IMG (CPT=93971)    Result Date: 5/3/2024  PROCEDURE:  US VENOUS DOPPLER LEG RIGHT - DIAG IMG (CPT=93971)  COMPARISON:  None.  INDICATIONS:  eval for DVT, right lower extremity pain  TECHNIQUE:  Real time, grey scale, and duplex ultrasound was used to evaluate the lower extremity venous system. B-mode two-dimensional images of the vascular structures, Doppler spectral analysis, and color flow.  Doppler imaging were performed.  The following veins were imaged:  Common, deep, and superficial femoral, popliteal, sapheno-femoral junction, posterior tibial veins, and the contralateral common femoral vein.   PATIENT STATED HISTORY: (As transcribed by Technologist)     FINDINGS:  EXTREMITY EXAMINED:  Right lower extremity SAPHENOFEMORAL JUNCTION:  No reflux. THROMBI:  None visible. COMPRESSION:  Normal compressibility, phasicity, and augmentation. OTHER:  Negative.            CONCLUSION:  Unremarkable right lower extremity venous ultrasound examination.   LOCATION:  [facility]    Dictated by (CST): Joshua Suarez MD on 5/03/2024 at 1:27 PM     Finalized by (CST): Joshua Suarez MD on 5/03/2024 at 1:27 PM        Medications   ketorolac (Toradol) 15 MG/ML injection 15 mg (15 mg Intravenous Given 5/3/24 1321)              MDM      Differential diagnosis included occult fracture but not limited such.  Patient here has arthritis of the joints above with effusion in the right knee.  The patient was given Toradol and is able to ambulate while here at this time we discharged on father physician for further management    Patient was screened and evaluated during this visit.   As a treating physician attending to the patient, I determined, within reasonable clinical confidence and prior to discharge, that an emergency medical condition was not or was no longer present.  There was no indication for further evaluation, treatment or admission on an emergency basis.       The usual and customary discharge instuctions were discussed given the patient's ER course.  We discussed signs and symptoms that should prompt the patient's immediate return to the emergency department.   Reasonable over the counter and prescription treatment options and Physician follow up plan was discussed.       The patient is discharged in good condition.       This note was prepared using Dragon Medical voice recognition dictation software.  As a result errors may occur.  When identified to these areas have been corrected.  While every attempt is made to correct errors during dictation discrepancies may still exist.  Please contact if there are any  errors.                                   Medical Decision Making      Disposition and Plan     Clinical Impression:  1. Recurrent pain of right knee    2. Right hip pain    3. Osteoarthritis, unspecified osteoarthritis type, unspecified site         Disposition:  Discharge  5/3/2024  2:32 pm    Follow-up:  Dilan Zamarripa MD  [address] [place] [id number]  [phone]    Schedule an appointment as soon as possible for a visit in 2 day(s)            Medications Prescribed:  Current Discharge Medication List        START taking these medications    Details   Meloxicam 7.5 MG Oral Tab Take 1 tablet (7.5 mg total) by mouth daily.  Qty: 30 tablet, Refills: 0

## 2024-05-03 NOTE — ED QUICK NOTES
THIS WRITER DID A ROW TEST ON PT, PT STATED SHE FELT OKAY WHEN GETTING UP AND WALKING, SAID SHE IS NOT CONFIDENT. PT WALKED WITH WALKER AROUND THE ROOM SLOWLY BUT STEADY. PT DID LEAN BACKWARDS WHEN GOING TO SIT BACK IN BED, REDIRECTED HER FEET THEN SHE WAS STABLE AGAIN.

## 2024-05-31 ENCOUNTER — HOSPITAL ENCOUNTER (OUTPATIENT)
Facility: HOSPITAL | Age: 79
Setting detail: OBSERVATION
Discharge: HOME HEALTH | End: 2024-06-03
Attending: EMERGENCY MEDICINE | Admitting: HOSPITALIST
Payer: MEDICARE

## 2024-05-31 ENCOUNTER — APPOINTMENT (OUTPATIENT)
Dept: GENERAL RADIOLOGY | Facility: HOSPITAL | Age: 79
End: 2024-05-31
Attending: EMERGENCY MEDICINE
Payer: MEDICARE

## 2024-05-31 ENCOUNTER — APPOINTMENT (OUTPATIENT)
Dept: CT IMAGING | Facility: HOSPITAL | Age: 79
End: 2024-05-31
Attending: EMERGENCY MEDICINE
Payer: MEDICARE

## 2024-05-31 DIAGNOSIS — Z79.4 TYPE 2 DIABETES MELLITUS WITH HYPERGLYCEMIA, WITH LONG-TERM CURRENT USE OF INSULIN (HCC): ICD-10-CM

## 2024-05-31 DIAGNOSIS — R53.1 ACUTE WEAKNESS: Primary | ICD-10-CM

## 2024-05-31 DIAGNOSIS — E11.65 TYPE 2 DIABETES MELLITUS WITH HYPERGLYCEMIA, WITH LONG-TERM CURRENT USE OF INSULIN (HCC): ICD-10-CM

## 2024-05-31 DIAGNOSIS — R26.9 GAIT DISTURBANCE: ICD-10-CM

## 2024-05-31 PROBLEM — E87.6 HYPOKALEMIA: Status: ACTIVE | Noted: 2024-05-31

## 2024-05-31 PROBLEM — D64.9 ANEMIA: Status: ACTIVE | Noted: 2024-05-31

## 2024-05-31 PROBLEM — E87.1 HYPONATREMIA: Status: ACTIVE | Noted: 2024-05-31

## 2024-05-31 LAB
ALBUMIN SERPL-MCNC: 2.7 G/DL (ref 3.4–5)
ALBUMIN/GLOB SERPL: 0.5 {RATIO} (ref 1–2)
ALP LIVER SERPL-CCNC: 80 U/L
ALT SERPL-CCNC: 8 U/L
ANION GAP SERPL CALC-SCNC: 11 MMOL/L (ref 0–18)
AST SERPL-CCNC: 16 U/L (ref 15–37)
ATRIAL RATE: 95 BPM
BASOPHILS # BLD AUTO: 0.07 X10(3) UL (ref 0–0.2)
BASOPHILS NFR BLD AUTO: 0.8 %
BILIRUB SERPL-MCNC: 0.6 MG/DL (ref 0.1–2)
BILIRUB UR QL STRIP.AUTO: NEGATIVE
BUN BLD-MCNC: 11 MG/DL (ref 9–23)
CALCIUM BLD-MCNC: 9.1 MG/DL (ref 8.5–10.1)
CHLORIDE SERPL-SCNC: 91 MMOL/L (ref 98–112)
CLARITY UR REFRACT.AUTO: CLEAR
CO2 SERPL-SCNC: 27 MMOL/L (ref 21–32)
CREAT BLD-MCNC: 0.63 MG/DL
EGFRCR SERPLBLD CKD-EPI 2021: 91 ML/MIN/1.73M2 (ref 60–?)
EOSINOPHIL # BLD AUTO: 0.1 X10(3) UL (ref 0–0.7)
EOSINOPHIL NFR BLD AUTO: 1.1 %
ERYTHROCYTE [DISTWIDTH] IN BLOOD BY AUTOMATED COUNT: 12.8 %
GLOBULIN PLAS-MCNC: 5 G/DL (ref 2.8–4.4)
GLUCOSE BLD-MCNC: 106 MG/DL (ref 70–99)
GLUCOSE BLD-MCNC: 112 MG/DL (ref 70–99)
GLUCOSE BLD-MCNC: 242 MG/DL (ref 70–99)
GLUCOSE BLD-MCNC: 300 MG/DL (ref 70–99)
GLUCOSE BLD-MCNC: 310 MG/DL (ref 70–99)
GLUCOSE UR STRIP.AUTO-MCNC: >1000 MG/DL
HCT VFR BLD AUTO: 31.8 %
HGB BLD-MCNC: 11 G/DL
IMM GRANULOCYTES # BLD AUTO: 0.04 X10(3) UL (ref 0–1)
IMM GRANULOCYTES NFR BLD: 0.5 %
KETONES UR STRIP.AUTO-MCNC: 20 MG/DL
LEUKOCYTE ESTERASE UR QL STRIP.AUTO: NEGATIVE
LYMPHOCYTES # BLD AUTO: 2.49 X10(3) UL (ref 1–4)
LYMPHOCYTES NFR BLD AUTO: 28.1 %
MCH RBC QN AUTO: 30.1 PG (ref 26–34)
MCHC RBC AUTO-ENTMCNC: 34.6 G/DL (ref 31–37)
MCV RBC AUTO: 86.9 FL
MONOCYTES # BLD AUTO: 1.12 X10(3) UL (ref 0.1–1)
MONOCYTES NFR BLD AUTO: 12.6 %
NEUTROPHILS # BLD AUTO: 5.05 X10 (3) UL (ref 1.5–7.7)
NEUTROPHILS # BLD AUTO: 5.05 X10(3) UL (ref 1.5–7.7)
NEUTROPHILS NFR BLD AUTO: 56.9 %
NITRITE UR QL STRIP.AUTO: NEGATIVE
OSMOLALITY SERPL CALC.SUM OF ELEC: 279 MOSM/KG (ref 275–295)
P-R INTERVAL: 138 MS
PH UR STRIP.AUTO: 6.5 [PH] (ref 5–8)
PLATELET # BLD AUTO: 426 10(3)UL (ref 150–450)
POTASSIUM SERPL-SCNC: 3.1 MMOL/L (ref 3.5–5.1)
PROT SERPL-MCNC: 7.7 G/DL (ref 6.4–8.2)
PROT UR STRIP.AUTO-MCNC: 20 MG/DL
Q-T INTERVAL: 374 MS
QRS DURATION: 100 MS
QTC CALCULATION (BEZET): 469 MS
R AXIS: -51 DEGREES
RBC # BLD AUTO: 3.66 X10(6)UL
RBC UR QL AUTO: NEGATIVE
SODIUM SERPL-SCNC: 129 MMOL/L (ref 136–145)
SP GR UR STRIP.AUTO: 1.01 (ref 1–1.03)
T AXIS: 45 DEGREES
TROPONIN I SERPL HS-MCNC: 29 NG/L
TSI SER-ACNC: 0.47 MIU/ML (ref 0.36–3.74)
UROBILINOGEN UR STRIP.AUTO-MCNC: NORMAL MG/DL
VENTRICULAR RATE: 95 BPM
WBC # BLD AUTO: 8.9 X10(3) UL (ref 4–11)

## 2024-05-31 PROCEDURE — 70450 CT HEAD/BRAIN W/O DYE: CPT | Performed by: EMERGENCY MEDICINE

## 2024-05-31 PROCEDURE — 71045 X-RAY EXAM CHEST 1 VIEW: CPT | Performed by: EMERGENCY MEDICINE

## 2024-05-31 RX ORDER — INSULIN DEGLUDEC 100 U/ML
30 INJECTION, SOLUTION SUBCUTANEOUS NIGHTLY
Status: DISCONTINUED | OUTPATIENT
Start: 2024-05-31 | End: 2024-06-03

## 2024-05-31 RX ORDER — METOPROLOL SUCCINATE 50 MG/1
50 TABLET, EXTENDED RELEASE ORAL DAILY
Status: DISCONTINUED | OUTPATIENT
Start: 2024-05-31 | End: 2024-06-03

## 2024-05-31 RX ORDER — MIRTAZAPINE 15 MG/1
30 TABLET, FILM COATED ORAL NIGHTLY
Status: DISCONTINUED | OUTPATIENT
Start: 2024-05-31 | End: 2024-06-03

## 2024-05-31 RX ORDER — METOPROLOL TARTRATE 50 MG/1
50 TABLET, FILM COATED ORAL ONCE
Status: ON HOLD | COMMUNITY
End: 2024-05-31 | Stop reason: CLARIF

## 2024-05-31 RX ORDER — METFORMIN HYDROCHLORIDE EXTENDED-RELEASE TABLETS 500 MG/1
1000 TABLET, FILM COATED, EXTENDED RELEASE ORAL 2 TIMES DAILY WITH MEALS
COMMUNITY

## 2024-05-31 RX ORDER — HYDROCHLOROTHIAZIDE 12.5 MG/1
12.5 TABLET ORAL DAILY
Status: DISCONTINUED | OUTPATIENT
Start: 2024-06-01 | End: 2024-06-03

## 2024-05-31 RX ORDER — POTASSIUM CHLORIDE 20 MEQ/1
40 TABLET, EXTENDED RELEASE ORAL ONCE
Status: COMPLETED | OUTPATIENT
Start: 2024-05-31 | End: 2024-05-31

## 2024-05-31 RX ORDER — SODIUM CHLORIDE 9 MG/ML
INJECTION, SOLUTION INTRAVENOUS CONTINUOUS
Status: ACTIVE | OUTPATIENT
Start: 2024-05-31 | End: 2024-05-31

## 2024-05-31 RX ORDER — INSULIN LISPRO 100 [IU]/ML
15 INJECTION, SOLUTION INTRAVENOUS; SUBCUTANEOUS
Status: DISCONTINUED | OUTPATIENT
Start: 2024-05-31 | End: 2024-05-31

## 2024-05-31 RX ORDER — HEPARIN SODIUM 5000 [USP'U]/ML
5000 INJECTION, SOLUTION INTRAVENOUS; SUBCUTANEOUS EVERY 8 HOURS SCHEDULED
Status: DISCONTINUED | OUTPATIENT
Start: 2024-05-31 | End: 2024-06-03

## 2024-05-31 RX ORDER — INSULIN DEGLUDEC 100 U/ML
60 INJECTION, SOLUTION SUBCUTANEOUS NIGHTLY
Status: DISCONTINUED | OUTPATIENT
Start: 2024-05-31 | End: 2024-05-31

## 2024-05-31 RX ORDER — TRAMADOL HYDROCHLORIDE 50 MG/1
50 TABLET ORAL EVERY 8 HOURS PRN
Status: DISCONTINUED | OUTPATIENT
Start: 2024-05-31 | End: 2024-06-03

## 2024-05-31 RX ORDER — METOPROLOL SUCCINATE 50 MG/1
50 TABLET, EXTENDED RELEASE ORAL DAILY
COMMUNITY

## 2024-05-31 RX ORDER — LEVOTHYROXINE SODIUM 0.12 MG/1
125 TABLET ORAL
Status: DISCONTINUED | OUTPATIENT
Start: 2024-06-01 | End: 2024-06-03

## 2024-05-31 RX ORDER — ACETAMINOPHEN 500 MG
500 TABLET ORAL EVERY 4 HOURS PRN
Status: DISCONTINUED | OUTPATIENT
Start: 2024-05-31 | End: 2024-06-03

## 2024-05-31 RX ORDER — SODIUM CHLORIDE 9 MG/ML
125 INJECTION, SOLUTION INTRAVENOUS CONTINUOUS
Status: DISCONTINUED | OUTPATIENT
Start: 2024-05-31 | End: 2024-05-31

## 2024-05-31 RX ORDER — SODIUM CHLORIDE 9 MG/ML
INJECTION, SOLUTION INTRAVENOUS CONTINUOUS
Status: DISCONTINUED | OUTPATIENT
Start: 2024-05-31 | End: 2024-06-01

## 2024-05-31 RX ORDER — ONDANSETRON 2 MG/ML
4 INJECTION INTRAMUSCULAR; INTRAVENOUS EVERY 6 HOURS PRN
Status: DISCONTINUED | OUTPATIENT
Start: 2024-05-31 | End: 2024-06-03

## 2024-05-31 RX ORDER — ATORVASTATIN CALCIUM 40 MG/1
40 TABLET, FILM COATED ORAL NIGHTLY
Status: DISCONTINUED | OUTPATIENT
Start: 2024-05-31 | End: 2024-06-03

## 2024-05-31 RX ORDER — INSULIN ASPART 100 [IU]/ML
0.15 INJECTION, SOLUTION INTRAVENOUS; SUBCUTANEOUS ONCE
Status: COMPLETED | OUTPATIENT
Start: 2024-05-31 | End: 2024-05-31

## 2024-05-31 NOTE — ED QUICK NOTES
Ambulated patient to bathroom with walker. Patient unsteady on feet. No complaints. MD and RN notified.

## 2024-05-31 NOTE — H&P
[facility]   part of [facility]    History and Physical     Yeny Rust Patient Status:  Observation    12/3/1945 MRN [id number]   Location [facility] 5NW-A Attending Lee Wise MD   Hosp Day # 0 PCP Joshua Haque MD     Chief Complaint:   Chief Complaint   Patient presents with    Weakness       History of Present Illness: Yeny Rust is a 78 year old female with DM2, CAD, HTN/HL, return arthritis who presented to the ED for evaluation of acute on chronic weakness.  She reported difficulty ambulating with feeling like her legs were giving out on her.  Symptoms have been going on for about a week.   reports that her long-acting insulin was recently cut in half and her prandial insulin was stopped due to intermittent episodes of hypoglycemia.  She is also longer taking her metformin.    On arrival she was afebrile, hemodynamically stable and saturating well room air.  Labs were notable for significant hyperglycemia with glucosuria but were otherwise unremarkable.  Sodium was  stable at baseline when corrected for her hyperglycemia.  She was started on IV fluids and admitted for blood glucose control and PT/OT evaluation.  Following arrival to the room, she was noted to retain urine.  (PVR greater than 500 mL).  She denies chest pain, shortness of breath, nausea or any significant joint pain at this time.        Past Medical History:  Past Medical History:    Actinic keratosis    Basal cell carcinoma    Breast cancer (HCC)    Invasive Ductal Carcinoma    CAD (coronary artery disease)    Diabetes (HCC)    Essential hypertension    Hyperlipidemia    Hypothyroidism    Muscle weakness    Osteoarthritis    Personal history of antineoplastic chemotherapy    Rheumatoid arthritis involving multiple sites with positive rheumatoid factor (HCC)    Squamous cell carcinoma    Type 2 diabetes mellitus (HCC)        Past Surgical History:   Past Surgical History:   Procedure Laterality Date     Cabg      Colonoscopy  6/2010    diverticulosis    Implant left  Nov. 2011    Reconstructive for mastectomy    Mastectomy left Left 9/21/10    Invasive Ductal CA    Other surgical history      basal cell removal    Removal of tunneled cva device, with subq port or pump, central or peripheral insertion  2/16/2012    Procedure: PORT-A-CATH REMOVAL;  Surgeon: Ozzy Ortiz MD;  [place] [facility] [place] [facility]    Tonsillectomy         Social History:  reports that she has never smoked. She has never used smokeless tobacco. She reports that she does not drink alcohol and does not use drugs.    Family History:   Family History   Problem Relation Age of Onset    Heart Disorder Father     Heart Disease Father     Cancer Mother         breast    Breast Cancer Mother 65        mom- dx age 60's    Breast Cancer Cousin 45        P 1st  cousin dx age 45    Breast Cancer Maternal Aunt 50        dx age 50's    Stroke Neg        Allergies:   Allergies   Allergen Reactions    Demerol ANAPHYLAXIS       Medications:    Current Facility-Administered Medications on File Prior to Encounter   Medication Dose Route Frequency Provider Last Rate Last Admin    [COMPLETED] ketorolac (Toradol) 15 MG/ML injection 15 mg  15 mg Intravenous Once David Cano MD   15 mg at 05/03/24 1321     Current Outpatient Medications on File Prior to Encounter   Medication Sig Dispense Refill    metoprolol succinate ER 50 MG Oral Tablet 24 Hr Take 1 tablet (50 mg total) by mouth daily.      Insulin Glargine, 1 Unit Dial, (TOUJEO SOLOSTAR) 300 UNIT/ML Subcutaneous Solution Pen-injector Inject 30 Units into the skin every evening.      traMADol 50 MG Oral Tab Take 1 tablet (50 mg total) by mouth every 8 (eight) hours as needed for Pain.      Levothyroxine Sodium 125 MCG Oral Tab Take 1 tablet (125 mcg total) by mouth every morning.      hydrochlorothiazide 12.5 MG Oral Cap Take 1 capsule (12.5 mg total) by mouth daily.      Atorvastatin Calcium (LIPITOR)  40 MG Oral Tab 1 TABLET DAILY      MULTI-VITAMIN OR 1 tablet daily      metFORMIN HCl ER, OSM, 500 MG (OSM) Oral Tablet 24 Hr Take 2 tablets (1,000 mg total) by mouth 2 (two) times daily with meals.      Meloxicam 7.5 MG Oral Tab Take 1 tablet (7.5 mg total) by mouth daily. (Patient not taking: Reported on 5/31/2024) 30 tablet 0    mupirocin 2 % External Ointment Apply 1 Application topically daily. 30 g 2    glimepiride 4 MG Oral Tab Take 1 tablet (4 mg total) by mouth every morning. (Patient not taking: Reported on 5/31/2024)      Insulin Lispro, Human, (HUMALOG KWIKPEN SC) Inject 15 Units into the skin. Before meals (Patient not taking: Reported on 5/31/2024)         Review of Systems:   A comprehensive 14 point review of systems was completed.    Pertinent positives and negatives noted in the HPI.    Physical Exam:    /83   Pulse 100   Temp 97.9 °F (36.6 °C) (Oral)   Resp 16   Ht 5' 3\" (1.6 m)   Wt 140 lb (63.5 kg)   SpO2 100%   BMI 24.80 kg/m²     General: No acute distress. Comfortable, nontoxic   HEENT: Normocephalic atraumatic. Moist mucous membranes; Sclera anicteric.  Neck: Supple, no JVD  Respiratory: Clear to auscultation bilaterally. Reg resp rate & effort, no wheezes/crackles   Cardiovascular: S1, S2. Regular rate and rhythm. No murmurs appreciable   Chest and Back: No tenderness or deformity.  Abdomen: Soft, nontender, nondistended.  Positive bowel sounds. No rebound, guarding or organomegaly.  Neurologic: No focal neurological deficits. CNII-XII grossly intact.  Musculoskeletal: Moves all extremities.  Extremities: No edema or cyanosis.  Integument: No rashes or lesions.   Psychiatric: Appropriate mood and affect.      Diagnostic Data:      Labs:  Recent Labs   Lab 05/31/24  0944   WBC 8.9   HGB 11.0*   MCV 86.9   .0       Recent Labs   Lab 05/31/24  0944   *   BUN 11   CREATSERUM 0.63   CA 9.1   ALB 2.7*   *   K 3.1*   CL 91*   CO2 27.0   ALKPHO 80   AST 16   ALT 8*    BILT 0.6   TP 7.7       Estimated Creatinine Clearance: 60.9 mL/min (based on SCr of 0.63 mg/dL).    No results for input(s): \"PTP\", \"INR\" in the last 168 hours.    No results for input(s): \"TROP\", \"CK\" in the last 168 hours.    Imaging: Imaging data reviewed in Epic.      ASSESSMENT / PLAN:     Yeny Rust Is a a 78 year old female who presents with generalized weakness, uncontrolled blood sugars     Problem List / Diagnoses    DM2 with uncontrolled hyperglycemia  Generalized weakness  Chronic diastolic CHF  Hypothyroidism  Rheumatoid arthritis  MDD    Plan    DM2 with uncontrolled hyperglycemia  -Patient notes reviewed, per per PCP she does not follow a diabetic diet or check her blood sugars.  Last A1c was 10.5 on 4/8/2024.  -Repeat A1c here  - Resume Toujeo 30 units nightly  - Accu-Cheks, SSI-medium, titrate as needed-diabetes APRN consulted for uncontrolled DM2    Generalized weakness  -Suspect secondary to hyperglycemia induced dehydration  - Started on maintenance IV fluids in the ED, will do straight to 75 mL an hour given known diastolic CHF  -PT/OT    Chronic diastolic CHF  -Currently appears euvolemic  -Resume home hydrochlorothiazide, metoprolol    Hypothyroidism  -Resume Synthroid    Rheumatoid arthritis  -PT/OT per above  - Per her PCP note she has previously been on prednisone, follows with rheumatology for RA and has reported pain in all joints.  Continue home Tylenol, Tylenol as needed    MDD  -Resume home Remeron    DVT Mechanical Prophylaxis:   SCDs,    DVT Pharmacologic Prophylaxis   Medication    heparin (Porcine) 5000 UNIT/ML injection 5,000 Units              Code Status: FULL--confirmed with patient on admission     Dispo: observation; SOLOMON 1 to 2 days pending improvement in blood sugars, PT/OT eval, possible placement    Plan of care discussed with patient and/or family at bedside.    VALDO Wise MD  [facility]   [phone]      r

## 2024-05-31 NOTE — ED INITIAL ASSESSMENT (HPI)
PT states that she has been feeling weak for the last two days. PT states that today she lowered herself to the ground in he bathroom due to feeling weak, unable to stand up bu herself, her  called EMS. PT denies bay vomit, diarrhea, fever or any other type of symptoms at this time. PT AAO 4/4.

## 2024-05-31 NOTE — PLAN OF CARE
NURSING ADMISSION NOTE      Patient admitted via Cart  Oriented to room.  Safety precautions initiated.  Bed in low position.  Call light in reach.    Patient arrived to floor with    Patient disoriented to time   Admission navigator complete w/ patient and patients   Patient and  unsure of PTA med list, patients  called this writer w/ PTA meds list approximately 1hr later, hospitalist and pharmacy notified   NSR/ST on tele monitor, peripheral pulses palpable, no edema present, on subcutaneous Heparin for VTE proph   Patient has bilateral hip and right leg discomfort upon touch and movement, MD aware   Patient attempted to urinate, approximately 6x6 section of anselmo was saturated, patient bladder scanned showed 782 mL  MD notified and patient straight cath'd, 900 mL oit via straight cath, difficult straight cath, 4 RN's attempted   MD ordered if patient retaining urine and next bladder scan >500 mL then place bazan catheter   0.9 NS infusing, MD changed rate to 75 mL/hr   Electrolyte replacement BPA flagging, patient not replaced on floor, patient was replaced in ED prior to electrolyte replacement protocol being ordered   Patient and patients  updated on POC  Safety precautions in place     /81 (BP Location: Right arm)   Pulse 103   Temp 98.2 °F (36.8 °C) (Oral)   Resp 18   Ht 160 cm (5' 3\")   Wt 63.5 kg (140 lb)   SpO2 100%   BMI 24.80 kg/m²

## 2024-05-31 NOTE — ED PROVIDER NOTES
Patient Seen in: [facility]      History     Chief Complaint   Patient presents with    Weakness     Stated Complaint: Weakness    Subjective:   HPI    78-year-old female presents to the emergency department after having after difficulty ambulating this morning and feeling like her legs were giving out on her repeatedly.  Patient states she has been feeling quite right for the past week.  No distinct chest pain or abdominal pain or headache.  She just states that she just is not feeling great and has had a poor appetite.  She is diabetic.  She states has been taking all of her medications.   confirms that she has not been wanting to eat much and has been trying to buy some of her favorite foods without success.  No distinct changes in her bowel habits.  She denies any urgency frequency or dysuria.  No history of fall or near fall.  No other acute complaints    Objective:   Past Medical History:    Actinic keratosis    Basal cell carcinoma    Breast cancer (HCC)    Invasive Ductal Carcinoma    CAD (coronary artery disease)    Diabetes (HCC)    Essential hypertension    Hyperlipidemia    Hypothyroidism    Muscle weakness    Osteoarthritis    Personal history of antineoplastic chemotherapy    Rheumatoid arthritis involving multiple sites with positive rheumatoid factor (HCC)    Squamous cell carcinoma    Type 2 diabetes mellitus (HCC)              Past Surgical History:   Procedure Laterality Date    Cabg      Colonoscopy  6/2010    diverticulosis    Implant left  Nov. 2011    Reconstructive for mastectomy    Mastectomy left Left 9/21/10    Invasive Ductal CA    Other surgical history      basal cell removal    Removal of tunneled cva device, with subq port or pump, central or peripheral insertion  2/16/2012    Procedure: PORT-A-CATH REMOVAL;  Surgeon: Ozzy Ortiz MD;  Location: [facility], [facility]    Tonsillectomy                  Social History     Socioeconomic History    Marital status:     Tobacco Use    Smoking status: Never    Smokeless tobacco: Never   Vaping Use    Vaping status: Never Used   Substance and Sexual Activity    Alcohol use: No    Drug use: No     Social Determinants of Health     Food Insecurity: No Food Insecurity (5/31/2024)    Food Insecurity     Food Insecurity: Never true   Transportation Needs: No Transportation Needs (5/31/2024)    Transportation Needs     Lack of Transportation: No   Physical Activity: Insufficiently Active (2/25/2020)    [facility]    Exercise Vital Sign     Days of Exercise per Week: 3 days     Minutes of Exercise per Session: 30 min   Housing Stability: Low Risk  (5/31/2024)    Housing Stability     Housing Instability: No     Housing Instability Emergency: No              Review of Systems   All other systems reviewed and are negative.      Positive for stated complaint: Weakness  Other systems are as noted in HPI.  Constitutional and vital signs reviewed.      All other systems reviewed and negative except as noted above.    Physical Exam     ED Triage Vitals   BP 05/31/24 0913 153/77   Pulse 05/31/24 0913 96   Resp 05/31/24 0913 16   Temp 05/31/24 0913 97.9 °F (36.6 °C)   Temp src 05/31/24 0913 Oral   SpO2 05/31/24 0913 99 %   O2 Device 05/31/24 0912 None (Room air)       Current Vitals:   Vital Signs  BP: 137/83  Pulse: 100  Resp: 16  Temp: 97.9 °F (36.6 °C)  Temp src: Oral  MAP (mmHg): 100    Oxygen Therapy  SpO2: 100 %  O2 Device: None (Room air)            Physical Exam  Vitals and nursing note reviewed. Chaperone present:  in room assisting with history.   Constitutional:       Appearance: Normal appearance. She is well-developed and normal weight.   HENT:      Head: Normocephalic and atraumatic.   Cardiovascular:      Rate and Rhythm: Normal rate and regular rhythm.      Pulses: Normal pulses.      Heart sounds: Normal heart sounds.   Pulmonary:      Effort: Pulmonary effort is  normal.      Breath sounds: Normal breath sounds. No stridor. No wheezing.   Abdominal:      General: Bowel sounds are normal.      Palpations: Abdomen is soft.      Tenderness: There is no abdominal tenderness. There is no rebound.   Musculoskeletal:         General: No tenderness. Normal range of motion.      Cervical back: Normal range of motion and neck supple.   Lymphadenopathy:      Cervical: No cervical adenopathy.   Skin:     General: Skin is warm and dry.      Capillary Refill: Capillary refill takes less than 2 seconds.      Coloration: Skin is not pale.   Neurological:      General: No focal deficit present.      Mental Status: She is alert and oriented to person, place, and time.      Cranial Nerves: No cranial nerve deficit.      Coordination: Coordination normal.      Gait: Gait abnormal.      Comments: Patient unable to ambulate without assistance.  Even with the use of her walker legs will intermittently buckle underneath her              ED Course     Labs Reviewed   COMP METABOLIC PANEL (14) - Abnormal; Notable for the following components:       Result Value    Glucose 310 (*)     Sodium 129 (*)     Potassium 3.1 (*)     Chloride 91 (*)     ALT 8 (*)     Albumin 2.7 (*)     Globulin  5.0 (*)     A/G Ratio 0.5 (*)     All other components within normal limits   URINALYSIS WITH CULTURE REFLEX - Abnormal; Notable for the following components:    Glucose Urine >1000 (*)     Ketones Urine 20 (*)     Protein Urine 20 (*)     All other components within normal limits   POCT GLUCOSE - Abnormal; Notable for the following components:    POC Glucose 300 (*)     All other components within normal limits   POCT GLUCOSE - Abnormal; Notable for the following components:    POC Glucose 112 (*)     All other components within normal limits   POCT GLUCOSE - Abnormal; Notable for the following components:    POC Glucose 106 (*)     All other components within normal limits   CBC W/ DIFFERENTIAL - Abnormal; Notable  for the following components:    RBC 3.66 (*)     HGB 11.0 (*)     HCT 31.8 (*)     Monocyte Absolute 1.12 (*)     All other components within normal limits   TROPONIN I HIGH SENSITIVITY - Normal   TSH W REFLEX TO FREE T4 - Normal   CBC WITH DIFFERENTIAL WITH PLATELET    Narrative:     The following orders were created for panel order CBC With Differential With Platelet.  Procedure                               Abnormality         Status                     ---------                               -----------         ------                     CBC W/ DIFFERENTIAL[[id number]]          Abnormal            Final result                 Please view results for these tests on the individual orders.   RAINBOW DRAW LAVENDER   RAINBOW DRAW LIGHT GREEN   RAINBOW DRAW BLUE     EKG    Rate, intervals and axes as noted on EKG Report.  Rate: 95  Rhythm: Sinus Rhythm  Reading: Occasional PVCs no acute ST segment elevation                 CT BRAIN OR HEAD ([id number])    Result Date: 5/31/2024  PROCEDURE:  CT BRAIN OR HEAD (76944)  COMPARISON:  [facility] , CT, CT BRAIN OR HEAD ([id number]), 3/26/2021, 10:55 PM.  INDICATIONS:  Weakness, unsteady gait  TECHNIQUE:  Noncontrast CT scanning is performed through the brain. Dose reduction techniques were used. Dose information is transmitted to the ACR (American College of Radiology) NRDR (National Radiology Data Registry) which includes the Dose Index Registry.  PATIENT STATED HISTORY: (As transcribed by Technologist)  Patient is here for increased weakness for 2 days.    FINDINGS:  Mild to moderate diffuse volume loss.  Minimal chronic small vessel ischemic disease.  There is no midline shift or mass-effect.  The basal cisterns are patent.  The gray-white matter differentiation is intact.  There is no acute intracranial hemorrhage or extra-axial fluid collection.   There is no evident fracture.  The visualized paranasal sinuses and mastoid air cells are unremarkable.            CONCLUSION:  1. No  acute intracranial hemorrhage or hydrocephalus. 2. Minimal chronic small vessel ischemic disease. If there is clinical concern for acute ischemia/infarction, an MRI of the brain would be recommended for further evaluation.    LOCATION:  [id number]   Dictated by (CST): Stromberg, LeRoy, MD on 5/31/2024 at 12:53 PM     Finalized by (CST): Stromberg, LeRoy, MD on 5/31/2024 at 12:55 PM       XR CHEST AP PORTABLE  (CPT=71045)    Result Date: 5/31/2024  PROCEDURE:  XR CHEST AP PORTABLE  (CPT=71045)  TECHNIQUE:  AP chest radiograph was obtained.  COMPARISON:  [facility] , XR, XR CHEST AP PORTABLE  (CPT=71045), 8/06/2023, 1:19 PM.  INDICATIONS:  Weakness  PATIENT STATED HISTORY: (As transcribed by Technologist)  Patient offered no additional history at this time.    FINDINGS:  Patient is rotated.  Hyperinflation of the lungs.  Cardiomediastinal silhouette is stable in size and appearance with sternotomy wires and mediastinal clips.  Asymmetric hazy opacification of the left mid to inferior lung is most likely a sequela of left mastectomy with super imposition of breast prosthesis.  No new focal consolidation, pleural effusion, or pneumothorax.            CONCLUSION:  1. Stable chest radiograph when compared to 8/6/2023 examination.   LOCATION:  [facility]      Dictated by (CST): Matilde Chowdhury MD on 5/31/2024 at 11:52 AM     Finalized by (CST): Matilde Chowdhury MD on 5/31/2024 at 11:53 AM              MDM    Patient had IV established and blood work obtained.  Patient was placed on a monitor.  She was normal sinus rhythm would have occasional PVCs but did not appreciate any other rhythm abnormalities.  She was given fluids and we obtain baseline blood work should not have a significant elevated white blood cell count to be concerned about any type of infection she is afebrile.  She does not give any history that would be consistent with infection other than her fatigue.  Certainly could be sign of a UTI but however her urinalysis is unremarkable  other than she did have some ketones in her urine which is consistent with her history of having had a poor appetite and not eating much.  She has an elevated blood sugar.  She is diabetic.  We did start to correct this with insulin and the fluids.  She has a low sodium but when corrected for her glucose is only mildly decreased.  She had a potassium of 3.1 in light of the fact that she is also getting some insulin she was given 40 mill equivalents of potassium.  I suspect that she may require more but this would most likely not give her the amount of weakness that she is having.  Patient required assistance for up and ambulation and had persistent problems with her gait.  Subsequently did a CT scan of her head to be certain there is nothing abnormal such as a subdural or bleed that could be the etiology.  CT scan was reviewed by radiology and no acute abnormalities appreciated.  We also did a chest x-ray there is no signs of an infiltrate.  Despite receiving fluids she states she feels a little bit better but is still very weak and feel that she is a distinct fall risk and there still may be some other underlying etiology that needs to be further evaluated subsequently she will be admitted to the \[facility]\"" for further evaluation and care also to continue better control of her blood sugars and her electrolyte disturbances.    Admission disposition: 5/31/2024  1:31 PM                                        Medical Decision Making      Disposition and Plan     Clinical Impression:  1. Acute weakness    2. Gait disturbance    3. Type 2 diabetes mellitus with hyperglycemia, with long-term current use of insulin ([facility])         Disposition:  Admit  5/31/2024  1:31 pm    Follow-up:  No follow-up provider specified.        Medications Prescribed:  Current Discharge Medication List                            Hospital Problems       Present on Admission  Date Reviewed: 5/31/2024            ICD-10-CM Noted POA    *  (Principal) Acute weakness R53.1 5/31/2024 Unknown    Anemia D64.9 5/31/2024 Yes    Gait disturbance R26.9 5/31/2024 Unknown    Hypokalemia E87.6 5/31/2024 Yes    Hyponatremia E87.1 5/31/2024 Yes    Type 2 diabetes mellitus with hyperglycemia, with long-term current use of insulin (HCC) E11.65, Z79.4 3/24/2022 Unknown    Overview Signed 3/24/2022  5:24 PM by Maira Galindo     A1C 11, per lab review

## 2024-05-31 NOTE — ED QUICK NOTES
Orders for admission, patient is aware of plan and ready to go upstairs. Any questions, please call ED RN Ozzy at extension [phone]      Patient Covid vaccination status: Fully vaccinated     COVID Test Ordered in ED: None    COVID Suspicion at Admission: N/A    Running Infusions:    sodium chloride      At a rate of 125 ml/hr    Mental Status/LOC at time of transport: Alert    Other pertinent information:     Fall risk    CIWA score: N/A   NIH score:  N/A

## 2024-06-01 LAB
ANION GAP SERPL CALC-SCNC: 7 MMOL/L (ref 0–18)
BASOPHILS # BLD AUTO: 0.07 X10(3) UL (ref 0–0.2)
BASOPHILS NFR BLD AUTO: 0.8 %
BUN BLD-MCNC: 7 MG/DL (ref 9–23)
CALCIUM BLD-MCNC: 8.3 MG/DL (ref 8.5–10.1)
CHLORIDE SERPL-SCNC: 103 MMOL/L (ref 98–112)
CO2 SERPL-SCNC: 27 MMOL/L (ref 21–32)
CREAT BLD-MCNC: 0.61 MG/DL
EGFRCR SERPLBLD CKD-EPI 2021: 91 ML/MIN/1.73M2 (ref 60–?)
EOSINOPHIL # BLD AUTO: 0.08 X10(3) UL (ref 0–0.7)
EOSINOPHIL NFR BLD AUTO: 0.9 %
ERYTHROCYTE [DISTWIDTH] IN BLOOD BY AUTOMATED COUNT: 13 %
GLUCOSE BLD-MCNC: 134 MG/DL (ref 70–99)
GLUCOSE BLD-MCNC: 170 MG/DL (ref 70–99)
GLUCOSE BLD-MCNC: 195 MG/DL (ref 70–99)
GLUCOSE BLD-MCNC: 223 MG/DL (ref 70–99)
GLUCOSE BLD-MCNC: 90 MG/DL (ref 70–99)
HCT VFR BLD AUTO: 29 %
HGB BLD-MCNC: 9.7 G/DL
IMM GRANULOCYTES # BLD AUTO: 0.03 X10(3) UL (ref 0–1)
IMM GRANULOCYTES NFR BLD: 0.3 %
LYMPHOCYTES # BLD AUTO: 3.35 X10(3) UL (ref 1–4)
LYMPHOCYTES NFR BLD AUTO: 37.1 %
MCH RBC QN AUTO: 29.8 PG (ref 26–34)
MCHC RBC AUTO-ENTMCNC: 33.4 G/DL (ref 31–37)
MCV RBC AUTO: 89.2 FL
MONOCYTES # BLD AUTO: 1.17 X10(3) UL (ref 0.1–1)
MONOCYTES NFR BLD AUTO: 13 %
NEUTROPHILS # BLD AUTO: 4.33 X10 (3) UL (ref 1.5–7.7)
NEUTROPHILS # BLD AUTO: 4.33 X10(3) UL (ref 1.5–7.7)
NEUTROPHILS NFR BLD AUTO: 47.9 %
OSMOLALITY SERPL CALC.SUM OF ELEC: 284 MOSM/KG (ref 275–295)
PLATELET # BLD AUTO: 372 10(3)UL (ref 150–450)
POTASSIUM SERPL-SCNC: 2.7 MMOL/L (ref 3.5–5.1)
POTASSIUM SERPL-SCNC: 3.4 MMOL/L (ref 3.5–5.1)
RBC # BLD AUTO: 3.25 X10(6)UL
SODIUM SERPL-SCNC: 137 MMOL/L (ref 136–145)
WBC # BLD AUTO: 9 X10(3) UL (ref 4–11)

## 2024-06-01 RX ORDER — POTASSIUM CHLORIDE 20 MEQ/1
40 TABLET, EXTENDED RELEASE ORAL ONCE
Status: COMPLETED | OUTPATIENT
Start: 2024-06-01 | End: 2024-06-01

## 2024-06-01 RX ORDER — POTASSIUM CHLORIDE 20 MEQ/1
40 TABLET, EXTENDED RELEASE ORAL ONCE
Status: DISCONTINUED | OUTPATIENT
Start: 2024-06-01 | End: 2024-06-03

## 2024-06-01 RX ORDER — POTASSIUM CHLORIDE 14.9 MG/ML
20 INJECTION INTRAVENOUS ONCE
Status: COMPLETED | OUTPATIENT
Start: 2024-06-01 | End: 2024-06-01

## 2024-06-01 NOTE — OCCUPATIONAL THERAPY NOTE
OCCUPATIONAL THERAPY EVALUATION - INPATIENT     Room Number: 514/514-A  Evaluation Date: 6/1/2024  Type of Evaluation: Initial  Presenting Problem: weakness,    Physician Order: IP Consult to Occupational Therapy  Reason for Therapy: ADL/IADL Dysfunction and Discharge Planning    OCCUPATIONAL THERAPY ASSESSMENT   Patient is currently functioning below baseline with  all ADL . Prior to admission, patient's baseline is modified independent. Used RW, Limited ambulation.   Patient is requiring minimal assist and moderate assist as a result of the following impairments: decreased functional strength, decreased endurance, and impaired standing balance. Occupational Therapy will continue to follow for duration of hospitalization.    Patient will benefit from continued skilled OT Services to promote return to prior level of function and safety with continuous assistance and gradual rehabilitative therapy       History Related to Current Admission: Patient is a 78 year old female admitted on 5/31/2024 with weakness. Pt was using a RW in the bathroom, felt weak, and lowered herself to the ground.  could not assist her, so he called EMT.  Admitted for weakness and hyperglycemia. Brain CT negative.      Recent visit:  5/3/24 ER visit for R knee and hip pain, arthritis    WEIGHT BEARING RESTRICTION  Weight Bearing Restriction: None                Recommendations for nursing staff:   Transfers:  min A     Toileting location: bedside commode    EVALUATION SESSION:  Patient Start of Session: supine  FUNCTIONAL TRANSFER ASSESSMENT  Sit to Stand: Edge of Bed  Edge of Bed: Minimal Assist  Toilet Transfer: Minimal Assist    BED MOBILITY  Supine to Sit : Minimal Assist       COGNITION  Safety Judgement:  decreased awareness of need for safety  Awareness of Errors:  decreased awareness of errors   Awareness of Deficits:  not aware of deficits  Problem Solving:  assistance required to identify errors made, assistance required to  generate solutions, and assistance required to implement solutions    Upper Extremity   ROM: within functional limits   Strength: within functional limits     EDUCATION PROVIDED  Patient: Role of Occupational Therapy; Plan of Care; Functional Transfer Techniques; Fall Prevention  Patient's Response to Education: Requires Further Education    Equipment used: rw     Therapist comments: pleasant.  present. Supine to sit min A.  Pt walked to the bathroom with RW and PT. Initially min A, but pt stared to flex her hips and knees, requiring mod A to take steps to the toilet.  Min A stand to sit toilet transfer.  OT provided total A for brief removal. Pt completed hygiene care independently.  Min A to stand from the toilet. CGA to place L hand onto the grab bar.   Cueing to achieve upright standing posture and to keep her knees straight.  Chair was placed just outside of the bathroom. Chair alarm on. Updated RN and  about the session.       Patient End of Session: Up in chair;Needs met;Call light within reach;RN aware of session/findings;All patient questions and concerns addressed;Alarm set;Family present    OCCUPATIONAL PROFILE    HOME SITUATION  Type of Home: House  Home Layout: Two level  Lives With: Spouse    Toilet and Equipment: Standard height toilet  Shower/Tub and Equipment: Walk-in shower  Other Equipment:  (rw. wheelchair)          Drives: No  Patient Regularly Uses: None    Prior Level of Function: independent with ADL. Per , decline in overall function x 1 week. Uses RW for limited ambulation in the house.  mentioned that they keep the wheelchair in the car, since \"it's just easier sometimes.\" No report of fall. Pt has not been eating well recently.      PAIN ASSESSMENT  Ratin          OBJECTIVE  Precautions: Bed/chair alarm  Fall Risk: High fall risk      ASSESSMENTS    AM-PAC ‘6-Clicks’ Inpatient Daily Activity Short Form  -   Putting on and taking off regular lower body  clothing?: A Lot  -   Bathing (including washing, rinsing, drying)?: A Lot  -   Toileting, which includes using toilet, bedpan or urinal? : A Lot  -   Putting on and taking off regular upper body clothing?: A Little  -   Taking care of personal grooming such as brushing teeth?: A Little  -   Eating meals?: A Little    AM-PAC Score:  Score: 15  Approx Degree of Impairment: 56.46%  Standardized Score (AM-PAC Scale): 34.69    ADDITIONAL TESTS     NEUROLOGICAL FINDINGS      COGNITION ASSESSMENTS       PLAN  OT Treatment Plan: Balance activities;Energy conservation/work simplification techniques;ADL training;Functional transfer training;UE strengthening/ROM;Patient/Family education;Patient/Family training;Equipment eval/education;Compensatory technique education  Rehab Potential : Fair  Frequency: 3x/week  Number of Visits to Meet Established Goals: 5    ADL Goals   Patient will perform upper body dressing:  with setup  Patient will perform lower body dressing:  with min assist  Patient will perform toileting: with supervision    Functional Transfer Goals  Patient will transfer to toilet:  with supervision    UE Exercise Program Goal  Patient will be independent with bilateral AROM HEP (home exercise program).    Additional Goals  Pt will incorporate 2 energy conservation techniques into ADL.    Patient Evaluation Complexity Level:   Occupational Profile/Medical History LOW - Brief history including review of medical or therapy records    Specific performance deficits impacting engagement in ADL/IADL LOW  1 - 3 performance deficits    Client Assessment/Performance Deficits MODERATE - Comorbidities and min to mod modifications of tasks    Clinical Decision Making LOW - Analysis of occupational profile, problem-focused assessments, limited treatment options    Overall Complexity LOW     OT Session Time: 23 minutes  Self-Care Home Management: 8 minutes  Therapeutic Activity: 4  minutes

## 2024-06-01 NOTE — PLAN OF CARE
Pt is A&Ox3 forgetful. VSS, afebrile. Chronic pain managed with prn meds. SPO2 maintained on room air. Continuous pulse ox. Denies any SOB, cough. Tele/NS. Carb controled diet. Denies any n/v/d. Voids. NS @ 75. Safety precautions in place. Pt is updated with plan of care.    Problem: Diabetes/Glucose Control  Goal: Glucose maintained within prescribed range  Description: INTERVENTIONS:  - Monitor Blood Glucose as ordered  - Assess for signs and symptoms of hyperglycemia and hypoglycemia  - Administer ordered medications to maintain glucose within target range  - Assess barriers to adequate nutritional intake and initiate nutrition consult as needed  - Instruct patient on self management of diabetes  Outcome: Progressing     Problem: Patient/Family Goals  Goal: Patient/Family Long Term Goal  Description: Patient's Long Term Goal:     Interventions:  -   - See additional Care Plan goals for specific interventions  Outcome: Progressing  Goal: Patient/Family Short Term Goal  Description: Patient's Short Term Goal: 5/31 noc: monitor urine output    Interventions:   - pvr  - See additional Care Plan goals for specific interventions  Outcome: Progressing

## 2024-06-01 NOTE — CM/SW NOTE
Submitted clinical via [employer] portal.  Naartjie Case/Auth ID is [id number].  Final insurance authorization is pending at this time.      SW/CM assigned to the case will continue to follow auth status.      Christiana Hall, DSC

## 2024-06-01 NOTE — PROGRESS NOTES
[facility]  Hospitalist Progress Note                                                                   [facility]    Yeny Rust  12/3/1945    SUBJECTIVE:    OBJECTIVE:  Temp:  [98.2 °F (36.8 °C)-100.1 °F (37.8 °C)] 100.1 °F (37.8 °C)  Pulse:  [] 87  Resp:  [15-18] 18  BP: (114-156)/(52-88) 119/88  SpO2:  [90 %-100 %] 95 %  Exam  Gen: No acute distress, alert and oriented x3, no focal neurologic deficits  Pulm: Lungs clear bilaterally, normal respiratory effort  CV: Heart with regular rate and rhythm, no murmur.  Normal PMI.    Abd: Abdomen soft, nontender, nondistended, no organomegaly, bowel sounds present  MSK: Full range of motion in extremities, no clubbing, no cyanosis  Skin: no rashes or lesions    Labs:   Recent Labs   Lab 05/31/24  0944 06/01/24  0748   WBC 8.9 9.0   HGB 11.0* 9.7*   MCV 86.9 89.2   .0 372.0       Recent Labs   Lab 05/31/24  0944 06/01/24  0747   * 137   K 3.1* 2.7*   CL 91* 103   CO2 27.0 27.0   BUN 11 7*   CREATSERUM 0.63 0.61   CA 9.1 8.3*   * 134*       Recent Labs   Lab 05/31/24  0944   ALT 8*   AST 16   ALB 2.7*       Recent Labs   Lab 05/31/24  1357 05/31/24  1433 05/31/24  2059 06/01/24  0517 06/01/24  1214   PGLU 112* 106* 242* 170* 195*       Meds:   Scheduled:    potassium chloride  40 mEq Oral Once    potassium chloride  20 mEq Intravenous Once    atorvastatin  40 mg Oral Nightly    hydroCHLOROthiazide  12.5 mg Oral Daily    levothyroxine  125 mcg Oral Daily @ 0700    heparin  5,000 Units Subcutaneous Q8H FARZANA    insulin degludec  30 Units Subcutaneous Nightly    insulin aspart  2-10 Units Subcutaneous TID AC and HS    metoprolol succinate ER  50 mg Oral Daily    mirtazapine  30 mg Oral Nightly     Continuous Infusions:   PRN:   traMADol    acetaminophen    ondansetron    Assessment/Plan:  Principal Problem:    Acute weakness  Active Problems:    Type 2 diabetes mellitus with  hyperglycemia, with long-term current use of insulin (HCC)    Hyponatremia    Anemia    Hypokalemia    Gait disturbance      Yeny Rust Is a a 78 year old female who presents with generalized weakness, uncontrolled blood sugars      Problem List / Diagnoses     DM2 with uncontrolled hyperglycemia  Generalized weakness  Chronic diastolic CHF  Hypothyroidism  Rheumatoid arthritis  MDD     Plan     DM2 with uncontrolled hyperglycemia- improved  -Patient notes reviewed, per per PCP she does not follow a diabetic diet or check her blood sugars.  Last A1c was 10.5 on 4/8/2024.  - Resume Toujeo 30 units nightly  - Accu-Cheks, SSI-medium, titrate as needed-diabetes APRN consulted for uncontrolled DM2  - consider restarting metformin as well on dc     Generalized weakness  -Suspect secondary to hyperglycemia induced dehydration and general deconditioning   -PT/OT recs rupa- pt declined.  Discussed w him he is insistent on taking pt home      Chronic diastolic CHF  -Currently appears euvolemic  -Resume home hydrochlorothiazide, metoprolol     Hypothyroidism  -Resume Synthroid     Rheumatoid arthritis  -PT/OT per above  - Per her PCP note she has previously been on prednisone, follows with rheumatology for RA and has reported pain in all joints.  Continue home Tylenol, Tylenol as needed     MDD  -Resume home Remeron     DVT Mechanical Prophylaxis:   SCDs,        DVT Pharmacologic Prophylaxis   Medication    heparin (Porcine) 5000 UNIT/ML injection 5,000 Units               Code Status: FULL--confirmed with patient on admission      Dispo: observation; hopefully dc tomorrow         Umer HORNE Hospitalist  Pager: [phone]

## 2024-06-01 NOTE — PROGRESS NOTES
Received pt at 0700, a&ox3, forgetful at times. O2 WNL on RA. NSR on tele. Encouraged to void. Up mod. + walker to BSC. K+ 2.7, replaced this shift. IVF discontinued. Tolerating QID accuchecks. Pt and  at bedside, no further questions on POC at this time.     Problem: Diabetes/Glucose Control  Goal: Glucose maintained within prescribed range  Description: INTERVENTIONS:  - Monitor Blood Glucose as ordered  - Assess for signs and symptoms of hyperglycemia and hypoglycemia  - Administer ordered medications to maintain glucose within target range  - Assess barriers to adequate nutritional intake and initiate nutrition consult as needed  - Instruct patient on self management of diabetes  Outcome: Progressing     Problem: Patient/Family Goals  Goal: Patient/Family Long Term Goal  Description: Patient's Long Term Goal: Discharge home with appropriate resources     Interventions:  - PT/OT   - IVF   - consults   - See additional Care Plan goals for specific interventions  Outcome: Progressing  Goal: Patient/Family Short Term Goal  Description: Patient's Short Term Goal: 5/31 noc: monitor urine output    Interventions:   - pvr  - See additional Care Plan goals for specific interventions  Outcome: Progressing

## 2024-06-01 NOTE — PHYSICAL THERAPY NOTE
PHYSICAL THERAPY EVALUATION - INPATIENT     Room Number: 514/514-A  Evaluation Date: 6/1/2024  Type of Evaluation: Initial  Physician Order: PT Eval and Treat    Presenting Problem: acute weakness  Co-Morbidities : CAD, RA, DM2, breast CA with L mastectomy  Reason for Therapy: Mobility Dysfunction and Discharge Planning    PHYSICAL THERAPY ASSESSMENT   Patient is currently functioning below baseline with bed mobility, transfers, gait, stair negotiation, and standing prolonged periods.  Prior to admission, patient's baseline is mod I with walker.  Patient is requiring minimal assist and moderate assist as a result of the following impairments: decreased functional strength, decreased endurance/aerobic capacity, impaired standing balance, and decreased muscular endurance.  Physical Therapy will continue to follow for duration of hospitalization.    Patient will benefit from continued skilled PT Services to promote return to prior level of function and safety with continuous assistance and gradual rehabilitative therapy .    PLAN  PT Treatment Plan: Bed mobility;Endurance;Energy conservation;Patient education;Gait training;Range of motion;Strengthening;Transfer training;Balance training  Rehab Potential : Good  Frequency (Obs): 3-5x/week  Number of Visits to Meet Established Goals: 5      CURRENT GOALS    Goal #1 Patient is able to demonstrate supine - sit EOB @ level: supervision     Goal #2 Patient is able to demonstrate transfers EOB to/from Chair/Wheelchair at assistance level: supervision     Goal #3 Patient is able to ambulate 75 feet with assist device: walker - rolling at assistance level: supervision     Goal #4    Goal #5    Goal #6    Goal Comments: Goals established on 6/1/2024      PHYSICAL THERAPY MEDICAL/SOCIAL HISTORY  History related to current admission: Patient is a 78 year old female admitted on 5/31/2024 from home for increasing weakness, lowered self to ground in bathroom at home.  Pt  The sheath was removed from the right radial artery.  diagnosed with weakness, uncontrolled hyperglycemia. Head CT negative for acute event per imaging report 5/31/24.Pt with ED visit 5/3/24 with recurrent right knee and right hip pain with findings of osteoarthritis/effusion Right knee with dc to home.      HOME SITUATION  Type of Home: House   Home Layout: Two level  Stairs to Enter : 1  Railing: No  Stairs to Bedroom: 10  Railing: Yes    Lives With: Spouse  Drives: No  Patient Owned Equipment: Rollator;Wheelchair  Patient Regularly Uses: None    Prior Level of [place]: Pt reports she typically ambulates with rollator, denies other falls except one described prior to admission. Pt spouse present and reports over last 1-2 weeks he has been having to help her more and and has assisted with her walking and transfers. He reports she has not been eating much and has been increasingly sedentary last few weeks.    SUBJECTIVE  \"I have to go to the bathroom\"      OBJECTIVE  Precautions: Bed/chair alarm  Fall Risk: High fall risk    WEIGHT BEARING RESTRICTION  Weight Bearing Restriction: None                PAIN ASSESSMENT  Rating:  (does not rate)  Location: B knees  Management Techniques: Activity promotion;Repositioning    COGNITION  Orientation Level:  oriented to place, oriented to situation, and oriented to person  Following Commands:  follows one step commands with repetition  Awareness of Errors:  assistance required to identify errors made, assistance required to correct errors made, and decreased awareness of errors   Awareness of Deficits:  decreased awareness of deficits    RANGE OF MOTION AND STRENGTH ASSESSMENT  Upper extremity ROM and strength are within functional limits     Lower extremity ROM is within functional limits     Lower extremity strength is grossly 4/5 throughout      BALANCE  Static Sitting: Good  Dynamic Sitting: Fair -  Static Standing: Poor +  Dynamic Standing: Poor    ADDITIONAL TESTS                                    ACTIVITY TOLERANCE:  pt fatigues after approx 5 ft gait requiring increased physical assist to maintain standing position, no SOB noted.                          O2 WALK  Oxygen Therapy  SPO2% on Room Air at Rest: 96    NEUROLOGICAL FINDINGS                        AM-PAC '6-Clicks' INPATIENT SHORT FORM - BASIC MOBILITY  How much difficulty does the patient currently have...  Patient Difficulty: Turning over in bed (including adjusting bedclothes, sheets and blankets)?: A Lot   Patient Difficulty: Sitting down on and standing up from a chair with arms (e.g., wheelchair, bedside commode, etc.): A Little   Patient Difficulty: Moving from lying on back to sitting on the side of the bed?: A Little   How much help from another person does the patient currently need...   Help from Another: Moving to and from a bed to a chair (including a wheelchair)?: A Lot   Help from Another: Need to walk in hospital room?: A Lot   Help from Another: Climbing 3-5 steps with a railing?: Total       AM-PAC Score:  Raw Score: 13   Approx Degree of Impairment: 64.91%   Standardized Score (AM-PAC Scale): 36.74   CMS Modifier (G-Code): CL    FUNCTIONAL ABILITY STATUS  Gait Assessment   Functional Mobility/Gait Assessment  Gait Assistance: Minimum assistance;Moderate assistance  Distance (ft): 12,3  Assistive Device: Rolling walker  Pattern: Shuffle;R Flexed knee;L Flexed knee (stooped posture)    Skilled Therapy Provided     Bed Mobility:  Rolling: NT  Supine to sit: min assist   Sit to supine: NT     Transfer Mobility:  Sit to stand: min/mod assist of 1   Stand to sit: min assist  Gait = initially requires cga/min assist, progresses to mod assist with fatigue, requires assist to navigate walker and continue forward progression of walker    Therapist's Comments: Pt presents supine in bed, agreeable to PT, requesting to use bathroom. Pt spouse present and assists with PLOF. RN approval for session noted. Pt mobility as above. Pt requires frequent cueing for  sequencing. Pt with increasing flexed knees and stooped posture with increased gait distance, pt requires max cueing to maintain posture and mod assist to maintain posture. Pt with decreased safety awareness to align self to toilet and chair prior to sitting. Currently recommend bedside commode for nsg staff, one assist with walker and gait belt. RN notified.    Exercise/Education Provided:  Bed mobility  Functional activity tolerated  Gait training  Posture  Transfer training    Patient End of Session: Up in chair;Needs met;Call light within reach;RN aware of session/findings;All patient questions and concerns addressed;Alarm set;Family present;Discussed recommendations with /      Patient Evaluation Complexity Level:  History Low - no personal factors and/or co-morbidities   Examination of body systems Low - addressing 1-2 elements   Clinical Presentation Low - Stable   Clinical Decision Making Low - Stable       PT Session Time: 25 minutes  Gait Training: 10 minutes  Therapeutic Activity: 10 minutes

## 2024-06-01 NOTE — CM/SW NOTE
06/01/24 1200   CM/SW Referral Data   Referral Source    Reason for Referral Discharge planning     HOME SITUATION  Type of Home: House   Home Layout: Two level  Stairs to Enter : 1  Railing: No  Stairs to Bedroom: 10  Railing: Yes     Lives With: Spouse  Drives: No  Patient Owned Equipment: Rollator;Wheelchair  Patient Regularly Uses: None     Prior Level of [place]: Pt reports she typically ambulates with rollator, denies other falls except one described prior to admission. Pt spouse present and reports over last 1-2 weeks he has been having to help her more and and has assisted with her walking and transfers. He reports she has not been eating much and has been increasingly sedentary last few weeks    PT=GT. CM spoke with spouse who states he does not want [facility] for pt. Discussed HH, spouse stating that HH has come to home before and was of no value. Encouraged HH/PT at discharge and if spouse is not satisified with care and cancel future viists. HH referral sent via [facility]. Will need F/U.    Giovany Hodge, MSN RN,   [phone]

## 2024-06-02 LAB
BASOPHILS # BLD AUTO: 0.1 X10(3) UL (ref 0–0.2)
BASOPHILS NFR BLD AUTO: 0.9 %
BILIRUB UR QL STRIP.AUTO: NEGATIVE
CLARITY UR REFRACT.AUTO: CLEAR
EOSINOPHIL # BLD AUTO: 0.11 X10(3) UL (ref 0–0.7)
EOSINOPHIL NFR BLD AUTO: 1 %
ERYTHROCYTE [DISTWIDTH] IN BLOOD BY AUTOMATED COUNT: 13 %
GLUCOSE BLD-MCNC: 133 MG/DL (ref 70–99)
GLUCOSE BLD-MCNC: 170 MG/DL (ref 70–99)
GLUCOSE BLD-MCNC: 188 MG/DL (ref 70–99)
GLUCOSE BLD-MCNC: 197 MG/DL (ref 70–99)
GLUCOSE UR STRIP.AUTO-MCNC: NORMAL MG/DL
HCT VFR BLD AUTO: 34.9 %
HGB BLD-MCNC: 11.6 G/DL
IMM GRANULOCYTES # BLD AUTO: 0.05 X10(3) UL (ref 0–1)
IMM GRANULOCYTES NFR BLD: 0.4 %
KETONES UR STRIP.AUTO-MCNC: NEGATIVE MG/DL
LEUKOCYTE ESTERASE UR QL STRIP.AUTO: 25
LYMPHOCYTES # BLD AUTO: 3.11 X10(3) UL (ref 1–4)
LYMPHOCYTES NFR BLD AUTO: 27.8 %
MCH RBC QN AUTO: 29.8 PG (ref 26–34)
MCHC RBC AUTO-ENTMCNC: 33.2 G/DL (ref 31–37)
MCV RBC AUTO: 89.7 FL
MONOCYTES # BLD AUTO: 1.27 X10(3) UL (ref 0.1–1)
MONOCYTES NFR BLD AUTO: 11.3 %
NEUTROPHILS # BLD AUTO: 6.56 X10 (3) UL (ref 1.5–7.7)
NEUTROPHILS # BLD AUTO: 6.56 X10(3) UL (ref 1.5–7.7)
NEUTROPHILS NFR BLD AUTO: 58.6 %
NITRITE UR QL STRIP.AUTO: NEGATIVE
PH UR STRIP.AUTO: 7.5 [PH] (ref 5–8)
PLATELET # BLD AUTO: 408 10(3)UL (ref 150–450)
POTASSIUM SERPL-SCNC: 4.1 MMOL/L (ref 3.5–5.1)
PROT UR STRIP.AUTO-MCNC: NEGATIVE MG/DL
RBC # BLD AUTO: 3.89 X10(6)UL
RBC UR QL AUTO: NEGATIVE
SP GR UR STRIP.AUTO: 1.01 (ref 1–1.03)
UROBILINOGEN UR STRIP.AUTO-MCNC: NORMAL MG/DL
WBC # BLD AUTO: 11.2 X10(3) UL (ref 4–11)

## 2024-06-02 RX ORDER — BISACODYL 10 MG
10 SUPPOSITORY, RECTAL RECTAL
Status: DISCONTINUED | OUTPATIENT
Start: 2024-06-02 | End: 2024-06-03

## 2024-06-02 RX ORDER — DOCUSATE SODIUM 100 MG/1
100 CAPSULE, LIQUID FILLED ORAL 2 TIMES DAILY
Status: DISCONTINUED | OUTPATIENT
Start: 2024-06-02 | End: 2024-06-03

## 2024-06-02 RX ORDER — POLYETHYLENE GLYCOL 3350 17 G/17G
17 POWDER, FOR SOLUTION ORAL DAILY PRN
Status: DISCONTINUED | OUTPATIENT
Start: 2024-06-02 | End: 2024-06-03

## 2024-06-02 NOTE — PLAN OF CARE
Pt is A&Ox3 forgetful. VSS, afebrile and denies any pain. SPO2 maintained on room air. Continuous pulse ox. Denies any SOB, cough. Tele/NS.  Heparin. Carb controled diet QID. Denies any n/v/d. voids Safety precautions in place.  Pt is updated with plan of care.    Problem: Diabetes/Glucose Control  Goal: Glucose maintained within prescribed range  Description: INTERVENTIONS:  - Monitor Blood Glucose as ordered  - Assess for signs and symptoms of hyperglycemia and hypoglycemia  - Administer ordered medications to maintain glucose within target range  - Assess barriers to adequate nutritional intake and initiate nutrition consult as needed  - Instruct patient on self management of diabetes  Outcome: Progressing     Problem: Patient/Family Goals  Goal: Patient/Family Long Term Goal  Description: Patient's Long Term Goal: Discharge home with appropriate resources     Interventions:  - PT/OT   - IVF   - consults   - See additional Care Plan goals for specific interventions  Outcome: Progressing  Goal: Patient/Family Short Term Goal  Description: Patient's Short Term Goal: 5/31 noc: monitor urine output  6/1 noc: monitor urine output  Interventions:   - pvr  - See additional Care Plan goals for specific interventions  Outcome: Progressing

## 2024-06-02 NOTE — PROGRESS NOTES
[facility]  Hospitalist Progress Note                                                                   [facility]    Yeny Rust  12/3/1945    F/u general weakness    SUBJECTIVE: sitting up in bed,  at bedside. Having dysuria/discomfort. No LH/dizziness.  says children called him about SNF and being covered by medicare- interested in knowing more    OBJECTIVE:  Temp:  [97.7 °F (36.5 °C)-98.6 °F (37 °C)] 98.6 °F (37 °C)  Pulse:  [85-99] 99  Resp:  [16-20] 19  BP: (125-164)/(58-88) 164/79  SpO2:  [96 %-98 %] 98 %  Exam  Gen: No acute distress, alert and oriented , no accessory m use  Pulm: Lungs clear bilaterally, normal respiratory effort  CV: Heart with regular rate and rhythm, no murmur.  No edema  Abd: Abdomen soft, nontender, nondistended, no organomegaly, bowel sounds present  MSK: , no clubbing, no cyanosis  Skin: no visible rashes or lesions  Calm, answering questions ok  Labs:   Recent Labs   Lab 05/31/24  0944 06/01/24  0748 06/02/24  1024   WBC 8.9 9.0 11.2*   HGB 11.0* 9.7* 11.6*   MCV 86.9 89.2 89.7   .0 372.0 408.0       Recent Labs   Lab 05/31/24  0944 06/01/24  0747 06/01/24  1844 06/02/24  0815   * 137  --   --    K 3.1* 2.7* 3.4* 4.1   CL 91* 103  --   --    CO2 27.0 27.0  --   --    BUN 11 7*  --   --    CREATSERUM 0.63 0.61  --   --    CA 9.1 8.3*  --   --    * 134*  --   --        Recent Labs   Lab 05/31/24  0944   ALT 8*   AST 16   ALB 2.7*       Recent Labs   Lab 06/01/24  0517 06/01/24  1214 06/01/24  1701 06/01/24  2135 06/02/24  0552   PGLU 170* 195* 223* 90 188*       Meds:   Scheduled:    docusate sodium  100 mg Oral BID    potassium chloride  40 mEq Oral Once    atorvastatin  40 mg Oral Nightly    hydroCHLOROthiazide  12.5 mg Oral Daily    levothyroxine  125 mcg Oral Daily @ 0700    heparin  5,000 Units Subcutaneous Q8H [facility]    insulin degludec  30 Units Subcutaneous Nightly    insulin aspart   2-10 Units Subcutaneous TID AC and HS    metoprolol succinate ER  50 mg Oral Daily    mirtazapine  30 mg Oral Nightly     Continuous Infusions:   PRN:   polyethylene glycol (PEG 3350)    magnesium hydroxide    bisacodyl    traMADol    acetaminophen    ondansetron    Assessment/Plan:  Principal Problem:    Acute weakness  Active Problems:    Type 2 diabetes mellitus with hyperglycemia, with long-term current use of insulin (HCC)    Hyponatremia    Anemia    Hypokalemia    Gait disturbance      Yeny Rust Is a a 78 year old female who presents with generalized weakness, uncontrolled blood sugars      Problem List / Diagnoses     DM2 with uncontrolled hyperglycemia  Generalized weakness  Chronic diastolic CHF  Hypothyroidism  Rheumatoid arthritis  MDD     Plan     DM2 with uncontrolled hyperglycemia- improved  -Patient notes reviewed, per per PCP she does not follow a diabetic diet or check her blood sugars.  Last A1c was 10.5 on 4/8/2024.  - Resumed Toujeo 30 units nightly  - Accu-Cheks, SSI-medium, titrate as needed-diabetes APRN consulted for uncontrolled DM2  - consider restarting metformin as well on dc     Generalized weakness  **acute on chronic anemia- repeat cbc pending  **dysuria- UA pending  -Suspect also could besecondary to hyperglycemia induced dehydration and general deconditioning   -PT/OT recs rupa- pt declined.  Per previous hospitalist,  insistent on taking pt home . However after he talked with children, asking about SNF- myla RN- JESSE to talk with them     Chronic diastolic CHF  -Currently appears euvolemic  -Resumed home hydrochlorothiazide, metoprolol     Hypothyroidism  -Resume Synthroid     Rheumatoid arthritis  -PT/OT per above  - Per her PCP note she has previously been on prednisone, follows with rheumatology for RA and has reported pain in all joints.  Continue home Tylenol, Tylenol as needed     MDD  -Resumed home Remeron     DVT Mechanical Prophylaxis:   SCDs,        DVT  Pharmacologic Prophylaxis   Medication    heparin (Porcine) 5000 UNIT/ML injection 5,000 Units                  Dispo:  pending UA, CBC, discharge planning    Dw pt, , RN [place]     Thank You,  Yeny Rutherford MD    [facility]  [facility]  Answering Service number: [phone]

## 2024-06-02 NOTE — PROGRESS NOTES
Received pt at 0700, a&ox3, forgetful at times. O2 WNL on RA. NSR on tele. Encouraged to void via purewick. Ucx pending. Up mod. + walker to BSC. PIV SL. Tolerating QID accuchecks. Plan to discharge to [facility]. Pt and  at bedside, no further questions on POC at this time.       Proble: Diabetes/Glucose Control  Goal: Glucose maintained within prescribed range  Description: INTERVENTIONS:  - Monitor Blood Glucose as ordered  - Assess for signs and symptoms of hyperglycemia and hypoglycemia  - Administer ordered medications to maintain glucose within target range  - Assess barriers to adequate nutritional intake and initiate nutrition consult as needed  - Instruct patient on self management of diabetes  Outcome: Progressing     Problem: Patient/Family Goals  Goal: Patient/Family Long Term Goal  Description: Patient's Long Term Goal: Discharge home with appropriate resources     Interventions:  - PT/OT   - IVF   - consults   - See additional Care Plan goals for specific interventions  Outcome: Progressing  Goal: Patient/Family Short Term Goal  Description: Patient's Short Term Goal:   5/31 noc: monitor urine output  6/1 noc: monitor urine output  6/2am: urinate pain free     Interventions:   - pvr  - See additional Care Plan goals for specific interventions  Outcome: Progressing

## 2024-06-03 VITALS
WEIGHT: 140 LBS | TEMPERATURE: 98 F | OXYGEN SATURATION: 99 % | SYSTOLIC BLOOD PRESSURE: 103 MMHG | HEART RATE: 106 BPM | BODY MASS INDEX: 24.8 KG/M2 | RESPIRATION RATE: 17 BRPM | DIASTOLIC BLOOD PRESSURE: 69 MMHG | HEIGHT: 63 IN

## 2024-06-03 LAB
ERYTHROCYTE [DISTWIDTH] IN BLOOD BY AUTOMATED COUNT: 13.1 %
EST. AVERAGE GLUCOSE BLD GHB EST-MCNC: 212 MG/DL (ref 68–126)
GLUCOSE BLD-MCNC: 134 MG/DL (ref 70–99)
GLUCOSE BLD-MCNC: 163 MG/DL (ref 70–99)
HBA1C MFR BLD: 9 % (ref ?–5.7)
HCT VFR BLD AUTO: 35.4 %
HGB BLD-MCNC: 11.7 G/DL
MCH RBC QN AUTO: 29.8 PG (ref 26–34)
MCHC RBC AUTO-ENTMCNC: 33.1 G/DL (ref 31–37)
MCV RBC AUTO: 90.1 FL
PLATELET # BLD AUTO: 389 10(3)UL (ref 150–450)
RBC # BLD AUTO: 3.93 X10(6)UL
WBC # BLD AUTO: 10.6 X10(3) UL (ref 4–11)

## 2024-06-03 PROCEDURE — 99233 SBSQ HOSP IP/OBS HIGH 50: CPT | Performed by: CLINICAL NURSE SPECIALIST

## 2024-06-03 RX ORDER — CEFADROXIL 500 MG/1
500 CAPSULE ORAL 2 TIMES DAILY
Qty: 10 CAPSULE | Refills: 0 | Status: SHIPPED | OUTPATIENT
Start: 2024-06-04 | End: 2024-06-09

## 2024-06-03 RX ORDER — MIRTAZAPINE 30 MG/1
30 TABLET, FILM COATED ORAL NIGHTLY
COMMUNITY

## 2024-06-03 NOTE — CM/SW NOTE
Spoke with patient's  (Mert) to discuss discharge planning. He states they are now considering [facility]. He will be here this afternoon, will leave [facility] list in room for them to review.    PASRR completed and queued for review.    SW/CM to remain available for support and/or discharge planning.    Addendum 11am: Sent to [facility] for review.    1pm: Met with patient's . He now says he isn't sure if he wants patient to go to rehab or home with HH. Provided [facility] and HH provider list from [facility]. Expressed benefits of [facility]. He is going to discuss with his family and let SW know decision. He is leaning towards home with HH. Will await decision.    3pm: Spoke with patient's daughter to discuss options.     Spoke with spouse again. Confirmed plan for home with [facility]. Provided [facility] resources just in case extra assistance at home is needed.    Informed by RN that patient is medically cleared for discharge. Spoke with Chun christianson and scheduled  for 5pm today. PCS form completed and available for RN to print. SW will remain available.      Chun Darling/[facility]  [phone] or [phone]        LYNNETTE Ocampo  Discharge Planner  [phone]

## 2024-06-03 NOTE — CONSULTS
[facility]  Diabetes Consult Note    Yeny Rust Patient Status:  Observation    12/3/1945 MRN [id number]   Location [facility] 5NW-A Attending Lee Wise MD   Hosp Day # 0 PCP Joshua Haque MD     Reason for Consult:     Recommendations for uncontrolled type 2 diabetes    Diagnosis:    Patient Active Problem List   Diagnosis    History of breast cancer    Type 2 diabetes mellitus with microalbuminuria, with long-term current use of insulin (HCC)    CAD (coronary artery disease)    History of basal cell carcinoma    Rheumatoid arthritis involving multiple sites with positive rheumatoid factor (HCC)    Hypothyroidism, unspecified type    History of squamous cell carcinoma    Immunosuppression due to drug therapy (HCC)    Type 2 diabetes mellitus with hyperglycemia, with long-term current use of insulin (HCC)    Cerebellar atrophy (HCC)    S/P left mastectomy    Sepsis due to undetermined organism (HCC)    Hyponatremia    Anemia    Hypokalemia    Acute weakness    Gait disturbance       Medical History:  Past Medical History:    Actinic keratosis    Basal cell carcinoma    Breast cancer (HCC)    Invasive Ductal Carcinoma    CAD (coronary artery disease)    Diabetes (HCC)    Essential hypertension    Hyperlipidemia    Hypothyroidism    Muscle weakness    Osteoarthritis    Personal history of antineoplastic chemotherapy    Rheumatoid arthritis involving multiple sites with positive rheumatoid factor (HCC)    Squamous cell carcinoma    Type 2 diabetes mellitus (HCC)     Past Surgical History:   Procedure Laterality Date    Cabg      Colonoscopy  2010    diverticulosis    Implant left  2011    Reconstructive for mastectomy    Mastectomy left Left 9/21/10    Invasive Ductal CA    Other surgical history      basal cell removal    Removal of tunneled cva device, with subq port or pump, central or peripheral insertion  2012    Procedure: PORT-A-CATH REMOVAL;  Surgeon: Ozzy Ortiz MD;   Location: [facility], [facility]    Tonsillectomy       Family History   Problem Relation Age of Onset    Heart Disorder Father     Heart Disease Father     Cancer Mother         breast    Breast Cancer Mother 65        mom- dx age 60's    Breast Cancer Cousin 45        P 1st  cousin dx age 45    Breast Cancer Maternal Aunt 50        dx age 50's    Stroke Neg        Diabetes history:  Type:  Type 2  Onset: ***  Family history of DM: ***    Allergies:   Allergies   Allergen Reactions    Demerol ANAPHYLAXIS       Medications: Complete list reviewed. Active diabetes medications include ***.    Labs:    Recent Labs   Lab 06/02/24  0552 06/02/24  1221 06/02/24  1658 06/02/24  2115 06/03/24  0528   PGLU 188* 197* 133* 170* 134*     Recent Labs     05/31/24  0944 05/31/24  1357 06/01/24  0747 06/01/24  1214 06/03/24  0528   *  --  137  --   --    CL 91*  --  103  --   --    CO2 27.0  --  27.0  --   --    BUN 11  --  7*  --   --    CREATSERUM 0.63  --  0.61  --   --    PGLU  --    < >  --    < > 134*   CA 9.1  --  8.3*  --   --    ALB 2.7*  --   --   --   --     < > = values in this interval not displayed.       {Results  Index  PMH  PSH  SocHx  FHx  Allergies  ProbList  Imaging  Cardio  Lab  Current Meds Med Hx  ExpDC :8307}           History of Present Illness: Yeny Rust is a 78 year old female with *** admitted 5/31/2024 for ***.      Assessment/Recommendations:    ***    Plan:    *** glucometer ordered  Dietitian consult for introduction to carbohydrate counting and meal planning  Case Management/Social Work consult for assistance with medication resources for discharge  Discharge to [facility]  Discharge to [facility]  Discharge to [facility]  Discharge to [facility]  Discharge to the [facility] for Follow-up Diabetes Self-Management Training-- Inadequate glycemic control/Needs Review/No previous education/Change in treatment  (Diet control to oral medications), New to insulin, Pregnancy  Discharge to the [facility] for Initial Diabetes Self-Management Training Classes, -- new onset diabetes/no previous education  Discharge to the [facility] for Medical Nutrition Therapy  Discharge to the [facility] for Insulin Pump Training  Discharge to the [facility] for Continuous Glucose Monitoring training  Discharge to the [facility] for Gestational Diabetes/Diabetes during Pregnancy Instruction    Nursing Recommendations:    RN to teach blood glucose monitoring with bedside glucometer  RN to teach insulin administration with an insulin pen - Reinforce the need to remove 2 caps from the home insulin pen needle   Patient to administer subcutaneously insulin injection with insulin pen under nursing supervision once return demonstration has verified patient's skill  RN to teach survival skills:  Provide \"Understanding Diabetes: Survival Skills and More!\" booklet and have patient/family view video on TV Education channel/Diabetes  Assist the patient/family with access to view the following videos:  \"Everyone Can Count Carbohydrates\" - to view prior to dietitian consult  \"Taking Insulin\" - to view prior to teaching administering a subcutaneous insulin injection  [facility] directions given/taught    PRESCRIPTION RECOMMENDATIONS:    Commercial -  ***  Insulin:   Novolog, Fiasp, Apidra, Admelog, Levemir, Lantus, Basaglar, Tresiba, Toujeo   Supplies:  BD pen needles (Maranda); BD syringes  Glucometer:  ***   CGM:  Dexcom G6; FreeStyle Brayan   Insulin Pump:  ***     [facility] Medicaid:  Insulin: Humalog, Levemir, Lantus   Supplies:  BD pen needles (Maranda)  Glucometer:  One Touch Ultra ([employer])  CGM:  Dexcom G6 (with Prior Auth)  Insulin Pump:  Omni Pod (with Prior Auth)    Commercial Medicaid - ***  Insulin: Humalog, Levemir, Lantus   Supplies:  BD pen needles  (Maranda)  Glucometer:  One Touch Ultra ([employer])  CGM:  ***   Insulin Pump:  ***     Medicare:  Insulin:   Novolog, Fiasp, Apidra, Admelog, Levemir, Lantus, Basaglar, Tresiba, Toujeo  (If no secondary insurance, may need prior auth)  Supplies:  BD pen needles (Maranda)  Glucometer:  ***    No insurance:    Insulin:  Relion Humulin Regular 70/30 pens; Mixed Insulin handout; referral to [facility]  Supplies:  Relion pen needles  Glucometer:  Relion      PROVIDER F/U RECOMMENDATIONS:    T[facility]  Patient's current PCP  [facility]  Endocrinologist  [facility]    A total of *** minutes were spent with the patient, 100% was spent counseling and coordinating care for *** diabetes self-management including nutrition, exercise, blood glucose monitoring, insulin administration, medications, treatment options, follow-up coordination and resources.    AFIA Howard  6/3/2024  9:09 AM

## 2024-06-03 NOTE — PHYSICAL THERAPY NOTE
PHYSICAL THERAPY TREATMENT NOTE - INPATIENT    Room Number: 514/514-A     Session: 1     Number of Visits to Meet Established Goals: 5    Presenting Problem: acute weakness  Co-Morbidities : CAD, RA, DM2, breast CA with L mastectomy    ASSESSMENT   Patient demonstrates fair progress this session, goals  remain in progress.    Patient continues to function below baseline with bed mobility, transfers, and gait.  Contributing factors to remaining limitations include decreased functional strength, decreased endurance/aerobic capacity, impaired standing balance, and decreased muscular endurance.  Next session anticipate patient to progress gait.  Physical Therapy will continue to follow patient for duration of hospitalization.    Patient continues to benefit from continued skilled PT services: to promote return to prior level of function and safety with continuous assistance and gradual rehabilitative therapy .    PLAN  PT Treatment Plan: Bed mobility;Endurance;Energy conservation;Patient education;Gait training;Range of motion;Strengthening;Transfer training;Balance training  Rehab Potential : Good  Frequency (Obs): 3-5x/week    CURRENT GOALS       Goal #1 Patient is able to demonstrate supine - sit EOB @ level: supervision      Goal #2 Patient is able to demonstrate transfers EOB to/from Chair/Wheelchair at assistance level: supervision      Goal #3 Patient is able to ambulate 75 feet with assist device: walker - rolling at assistance level: supervision      Goal #4     Goal #5     Goal #6     Goal Comments: Goals established on 2024     6/3/2024 all goals ongoing     SUBJECTIVE  \"I think I need to rest now\"     OBJECTIVE  Precautions: Bed/chair alarm    WEIGHT BEARING RESTRICTION  Weight Bearing Restriction: None                PAIN ASSESSMENT   Ratin  Location: pt denies pain  Management Techniques: Activity promotion;Repositioning    BALANCE                                                                                                                        Static Sitting: Fair  Dynamic Sitting: Fair -           Static Standing: Poor +  Dynamic Standing: Poor    ACTIVITY TOLERANCE                         O2 WALK         AM-PAC '6-Clicks' INPATIENT SHORT FORM - BASIC MOBILITY  How much difficulty does the patient currently have...  Patient Difficulty: Turning over in bed (including adjusting bedclothes, sheets and blankets)?: A Lot   Patient Difficulty: Sitting down on and standing up from a chair with arms (e.g., wheelchair, bedside commode, etc.): A Little   Patient Difficulty: Moving from lying on back to sitting on the side of the bed?: A Little   How much help from another person does the patient currently need...   Help from Another: Moving to and from a bed to a chair (including a wheelchair)?: A Lot   Help from Another: Need to walk in hospital room?: A Lot   Help from Another: Climbing 3-5 steps with a railing?: Total       AM-PAC Score:  Raw Score: 13   Approx Degree of Impairment: 64.91%   Standardized Score (AM-PAC Scale): 36.74   CMS Modifier (G-Code): CL    FUNCTIONAL ABILITY STATUS  Gait Assessment   Functional Mobility/Gait Assessment  Gait Assistance: Moderate assistance;Minimum assistance  Distance (ft): 3,20  Assistive Device: Rolling walker  Pattern: L Decreased stance time;Shuffle (L knee flexion, flexed posture)    Skilled Therapy Provided  Pt presents seated EOB , PCT and pt's spouse present  Pt educated on safe mobility, use of GB and benefits of mobility   Pt gait trained c RW chair follow as pt fatigues quickly  Therapist replaced RW with more suitable one for pt and appropriately sized. Therapist also placed GB in room for staff to use when mobilizing pt   Pt left in chair, needs met, alarm set, PCT present   Bed Mobility:  Rolling:    Supine<>Sit:    Sit<>Supine:      Transfer Mobility:  Sit<>Stand: mod A progressing to min A , cues for hand placement, L foot blocked    Stand<>Sit: min A     Gait: mod A, progressed to min A , L knee noted unstable, cues for increased quad activation     Therapist's Comments:           Patient End of Session: Up in chair;Needs met;With  staff;Call light within reach;RN aware of session/findings;All patient questions and concerns addressed;Alarm set;Family present    PT Session Time: 24 minutes  Gait Trainin minutes  Therapeutic Activity: 12 minutes  Therapeutic Exercise:  minutes   Neuromuscular Re-education:  minutes

## 2024-06-03 NOTE — DISCHARGE INSTRUCTIONS
Sometimes managing your health at home requires assistance.  The [facility] team has recognized your preference to use [facility].  They can be reached by phone at [phone].  The fax number for your reference is [phone].. A representative from the home health agency will contact you or your family to schedule your first visit.

## 2024-06-03 NOTE — HOME CARE LIAISON
Received referral via [facility] for Home Health services. Spoke w/ patient and her  who is agreeable with [facility]. Contact information placed on AVS.

## 2024-06-03 NOTE — PLAN OF CARE
Pt is A&Ox3, forgetful. VSS, afebrile. SPO2 maintained on RA. Tele, NSR. EP. Heparin. Last BM 6/2. Carb controlled diet, QID accuchek. Voids. Up x1 walker. Denies pain. PIV, SL. Wounds c/d/i. No further needs at this time, continue POC. Safety precautions in place.       Problem: Diabetes/Glucose Control  Goal: Glucose maintained within prescribed range  Description: INTERVENTIONS:  - Monitor Blood Glucose as ordered  - Assess for signs and symptoms of hyperglycemia and hypoglycemia  - Administer ordered medications to maintain glucose within target range  - Assess barriers to adequate nutritional intake and initiate nutrition consult as needed  - Instruct patient on self management of diabetes  Outcome: Progressing     Problem: Patient/Family Goals  Goal: Patient/Family Long Term Goal  Description: Patient's Long Term Goal: Discharge home with appropriate resources     Interventions:  - PT/OT   - IVF   - consults   - See additional Care Plan goals for specific interventions  Outcome: Progressing  Goal: Patient/Family Short Term Goal  Description: Patient's Short Term Goal:   5/31 noc: monitor urine output  6/1 noc: monitor urine output  6/2am: urinate pain free  6/2 noc: sleep     Interventions:   - pvr  - See additional Care Plan goals for specific interventions  Outcome: Progressing

## 2024-06-03 NOTE — CM/SW NOTE
Department  asked to send updates to Aidin referral for GANESH.    Assigned SW/CM to follow up with patient/family on discharge plan.     Christiana Hall, DSC

## 2024-06-03 NOTE — DISCHARGE SUMMARY
General Medicine Discharge Summary     Patient ID:  Yeny Rust  78 year old  12/3/1945    Admit date: 5/31/2024    Discharge date and time: 6/3/2024     Attending Physician: No att. providers found     Primary Care Physician: Joshua Haque MD     Discharge Diagnoses: Gait disturbance [R26.9]  Acute weakness [R53.1]  Type 2 diabetes mellitus with hyperglycemia, with long-term current use of insulin (HCC) [E11.65, Z79.4]    Primary Diagnosis at discharge from Hospital: Other: DM2 with uncontrolled hyperglycemia; still recommend for TCM follow-up    Please note that only [facility] and [facility] patients enrolled in the Medicare ACO, BCBS ACO and [facility] HMOs will be handled by the [facility] Care Management team.  For all other patients, please follow usual protocol for discharge care transition.    Secondary Diagnoses:    Generalized weakness  Chronic diastolic CHF  Hypothyroidism  Rheumatoid arthritis  MDD    Risk of readmission: Yeny Rust has High Risk of readmission after discharge from the hospital.    Discharge Condition: stable    Disposition: home       Important Follow up:  - PCP within   - Consults:     Joshua Dobson MD  [address] [facility] [address]  [phone]    Follow up in 1 week(s)  Follow up    - Labs: n/a  - Radiology: n/a    Hospital Course:      For further details, please see daily progress notes. In brief,      Yeny Rust Is a a 78 year old female who presents with generalized weakness, uncontrolled blood sugars      Problem List / Diagnoses     DM2 with uncontrolled hyperglycemia  Generalized weakness  Chronic diastolic CHF  Hypothyroidism  Rheumatoid arthritis  MDD     Plan     DM2 with uncontrolled hyperglycemia- improved  -Patient notes reviewed, per per PCP she does not follow a diabetic diet or check her blood sugars.  Last A1c was 10.5 on 4/8/2024.  - Resumed Toujeo 30 units nightly with improvement   - Accu-Cheks, SSI-medium, titrate  as needed-diabetes APRN consulted for uncontrolled DM2 -> CGM provided with instructions on management   - resume metformin on dc   -- educated on low carbohydrate diet      Generalized weakness  **acute on chronic anemia- repeat cbc stable >24 h on day of discharge   **dysuria- UA pending  -Suspect also could besecondary to hyperglycemia induced dehydration and general deconditioning   -PT/OT recs rupa- pt declined.  Per previous hospitalist,  insistent on taking pt home. Reviewed risk/benefits of SNF vs home and on day of discharge they verbalized understanding & reiterated a desire to go home.      Chronic diastolic CHF  -Currently appears euvolemic  -Resumed home hydrochlorothiazide, metoprolol     Hypothyroidism  -Resume Synthroid     Rheumatoid arthritis  -PT/OT per above  - Per her PCP note she has previously been on prednisone, follows with rheumatology for RA and has reported pain in all joints.  Continue home Tylenol, Tylenol as needed     MDD  -Resumed home Remeron    Consults: IP CONSULT TO HOSPITALIST  IP CONSULT TO SOCIAL WORK  IP CONSULT TO SOCIAL WORK    Operative Procedures:        Patient instructions:      All medications personally reviewed and reconciled on day of discharge.     Discharge Medication List as of 6/3/2024  5:04 PM        START taking these medications    Details   cefadroxil 500 MG Oral Cap Take 1 capsule (500 mg total) by mouth 2 (two) times daily for 5 days., Normal, Disp-10 capsule, R-0           CONTINUE these medications which have NOT CHANGED    Details   mirtazapine 30 MG Oral Tab Take 1 tablet (30 mg total) by mouth nightly., Historical      metFORMIN HCl ER, OSM, 500 MG (OSM) Oral Tablet 24 Hr Take 2 tablets (1,000 mg total) by mouth 2 (two) times daily with meals., Historical      metoprolol succinate ER 50 MG Oral Tablet 24 Hr Take 1 tablet (50 mg total) by mouth daily., Historical      mupirocin 2 % External Ointment Apply 1 Application topically daily., Normal,  Disp-30 g, R-2      Insulin Glargine, 1 Unit Dial, (TOUJEO SOLOSTAR) 300 UNIT/ML Subcutaneous Solution Pen-injector Inject 30 Units into the skin every evening., Historical      traMADol 50 MG Oral Tab Take 1 tablet (50 mg total) by mouth every 8 (eight) hours as needed for Pain., Historical      Levothyroxine Sodium 125 MCG Oral Tab Take 1 tablet (125 mcg total) by mouth every morning., Historical      hydrochlorothiazide 12.5 MG Oral Cap Take 1 capsule (12.5 mg total) by mouth daily., Historical      Atorvastatin Calcium (LIPITOR) 40 MG Oral Tab 1 TABLET DAILY, Historical      MULTI-VITAMIN OR 1 tablet daily, Historical           STOP taking these medications       Meloxicam 7.5 MG Oral Tab        glimepiride 4 MG Oral Tab        Insulin Lispro, Human, (HUMALOG KWIKPEN SC)              Activity: activity as tolerated  Diet: diabetic diet  Wound Care: as directed  Code Status: No Order      Exam on day of discharge:     Vitals:    06/03/24 1500   BP: 103/69   Pulse: 106   Resp: 17   Temp: 97.6 °F (36.4 °C)       General: nad, comfortable, nontoxic   Heart: RRR, no murmurs appreciated   Lungs: clear bilaterally, reg resp rate & effort, no wheezes/crackles   Abdomen: soft, ntnd, no guarding/rebound   Extremities: WWP, no ELVER   Neuro: CN inact, no focal deficits      Total time coordinating care for discharge: 35 minutes    VALDO Wise MD  [facility] Hospitalist  [phone]

## 2024-06-03 NOTE — CONSULTS
[facility]  Diabetes Clinical Nurse Specialist Consult Note    Yeny Rust Patient Status:  Observation    12/3/1945 MRN [id number]   Location [facility] 5NW-A Attending Lee Wise MD   Hosp Day # 0 PCP Joshua Haque MD     Reason for Consult:    Uncontrolled DM2, weakness    Diagnosis:    Patient Active Problem List   Diagnosis    History of breast cancer    Type 2 diabetes mellitus with microalbuminuria, with long-term current use of insulin (HCC)    CAD (coronary artery disease)    History of basal cell carcinoma    Rheumatoid arthritis involving multiple sites with positive rheumatoid factor (HCC)    Hypothyroidism, unspecified type    History of squamous cell carcinoma    Immunosuppression due to drug therapy (HCC)    Type 2 diabetes mellitus with hyperglycemia, with long-term current use of insulin (HCC)    Cerebellar atrophy (HCC)    S/P left mastectomy    Sepsis due to undetermined organism (HCC)    Hyponatremia    Anemia    Hypokalemia    Acute weakness    Gait disturbance       Labs:    Recent Labs   Lab 24  1221 24  1658 24  2115 24  0528 24  1116   PGLU 197* 133* 170* 134* 163*     Recent Labs   Lab 24  0944 24  0747   CREATSERUM 0.63 0.61     HGBA1C:    Lab Results   Component Value Date    A1C 11.5 (H) 10/27/2021    A1C 7.1 2018     10/27/2021       Discussio  Pt is 78 YOF who presented to the ED on 2024 via EMS with weakness x 2 days after being unable to stand up by herself. Serum glucose was 310. She has hx of DM2, recently long acting insulin was ct in half, prandial insulin stopped due to intermitten hypoglycemia. Other hx includes RA, HTN, CAD, diastolic CHF, breast cancer. Apparently her last A1C was 10.5 on 2024. She follows with [facility] in [place]. She has had a poor appetite lately.    Discharge plans are currently pending, pt may be discharged to a [facility]. She is on 30 units Toujeo at home, and  500 mg metformin BID. Per MD note in chart of 4/17/2024 she does not check her blood glucose or eat a diabetic diet. Does not appear to have an endocrinologist.    Met with patient and . She appears very frail. It turns out that she just received in the mail a Freestyle Brayan 3 and apparently a  as well. She does have an iphone however, so she could either use the priscilla or the . She has not tested her blood glucose otherwise. They were concerned about how it worked, so I brought up a sample, put it on myself to show how simple it is, and started the sensor using the priscilla on my phone. If there are plans for her to discharge tomorrow, I will put one on her then.    Apparently she just received a prescription for Jardiance, I discussed with them that Jardiance can cause increased urination and dehydration.     Recommendation:  Pt should check blood sugars with sensor and Freestyle 3  (it turns out they were just shipped one from home), home health can apply it, report blood glucoses to Dr. Morel's office, continue 30 units of Toujeo daiy, and I recommend do not start Jardiance due to risk of increased urination, dehydration, weakness, risk for falls.  Then f/u at [facility] for possible correction insulin recommendations.     I spent a total of 45 minutes with the patient, 100% of this encounter was spent counseling patient regarding diet, medications, side effects, treatment options,  discharge planning.     Assessment/Plan:  Dx: DM2 with hyperglycemia  Insulin per hospitalist  PT/OT  CGM education  Home health after discharge to home  Discharge to [facility]      Recommendations:  Vickie Chan, MSN, APN, ACNS-BC, BC-ADM  Clinical Nurse Specialist  Diabetes Educator  6/3/2024  11:57 AM

## 2024-06-03 NOTE — CM/SW NOTE
Department  notified care team of Jeanmarie approval, see below.    Assigned SW/CM to follow up with patient/family on discharge plan.     6/3 -- Jeanmarie ID [id number], approved 6/1 - 6/3 (**PAC)      Christiana Hall, DSC

## 2024-06-03 NOTE — PROGRESS NOTES
Discharge education provided and all questions answered. Tele removed and piv removed. Answered all questions for  about discharge plan. Taken by med car at time of discharge.

## 2024-06-07 ENCOUNTER — PATIENT OUTREACH (OUTPATIENT)
Dept: CASE MANAGEMENT | Age: 79
End: 2024-06-07

## 2024-06-07 NOTE — PROGRESS NOTES
DM apt request (discharged 06/03)    Dr Joshua Haque  Internal Medicine  [address] [id number] [phone]  [facility] to call [phone] to assist w/scheduling apt

## 2024-06-10 NOTE — PROGRESS NOTES
DM apt request (discharged 06/03)     Dr Joshua Haque  Internal Medicine  [address] [address]  [phone]  Pt , Mert declined a sooner apt; pt has existing apt Thu 07/11 @8:30am  Closing encounter

## 2024-06-20 ENCOUNTER — INITIAL APN SNF VISIT (OUTPATIENT)
Dept: INTERNAL MEDICINE CLINIC | Age: 79
End: 2024-06-20

## 2024-06-20 DIAGNOSIS — R26.9 FUNCTIONAL GAIT DISORDER: ICD-10-CM

## 2024-06-20 DIAGNOSIS — M06.9 RHEUMATOID ARTHRITIS FLARE (HCC): ICD-10-CM

## 2024-06-20 DIAGNOSIS — E08.65 DIABETES MELLITUS DUE TO UNDERLYING CONDITION, UNCONTROLLED, WITH HYPERGLYCEMIA (HCC): ICD-10-CM

## 2024-06-20 DIAGNOSIS — F32.0 CURRENT MILD EPISODE OF MAJOR DEPRESSIVE DISORDER, UNSPECIFIED WHETHER RECURRENT (HCC): ICD-10-CM

## 2024-06-20 DIAGNOSIS — R53.81 PHYSICAL DECONDITIONING: ICD-10-CM

## 2024-06-20 DIAGNOSIS — I50.32 CHRONIC DIASTOLIC HEART FAILURE (HCC): ICD-10-CM

## 2024-06-20 DIAGNOSIS — Z90.12 HISTORY OF MASTECTOMY, LEFT: ICD-10-CM

## 2024-06-20 DIAGNOSIS — R53.1 WEAKNESS GENERALIZED: ICD-10-CM

## 2024-06-20 DIAGNOSIS — Z86.39 HISTORY OF INSULIN DEPENDENT DIABETES MELLITUS: Primary | ICD-10-CM

## 2024-06-20 DIAGNOSIS — E87.6 HYPOKALEMIA: ICD-10-CM

## 2024-06-20 DIAGNOSIS — D50.8 OTHER IRON DEFICIENCY ANEMIA: ICD-10-CM

## 2024-06-24 ENCOUNTER — SNF VISIT (OUTPATIENT)
Dept: INTERNAL MEDICINE CLINIC | Age: 79
End: 2024-06-24

## 2024-06-24 VITALS
WEIGHT: 103.38 LBS | SYSTOLIC BLOOD PRESSURE: 124 MMHG | OXYGEN SATURATION: 97 % | TEMPERATURE: 97 F | RESPIRATION RATE: 18 BRPM | DIASTOLIC BLOOD PRESSURE: 77 MMHG | HEART RATE: 75 BPM | BODY MASS INDEX: 18 KG/M2

## 2024-06-24 VITALS
RESPIRATION RATE: 18 BRPM | HEART RATE: 70 BPM | DIASTOLIC BLOOD PRESSURE: 77 MMHG | TEMPERATURE: 98 F | WEIGHT: 103.5 LBS | SYSTOLIC BLOOD PRESSURE: 129 MMHG | BODY MASS INDEX: 18 KG/M2 | OXYGEN SATURATION: 97 %

## 2024-06-24 DIAGNOSIS — R53.1 WEAKNESS GENERALIZED: ICD-10-CM

## 2024-06-24 DIAGNOSIS — E08.65 DIABETES MELLITUS DUE TO UNDERLYING CONDITION, UNCONTROLLED, WITH HYPERGLYCEMIA (HCC): ICD-10-CM

## 2024-06-24 DIAGNOSIS — R53.81 PHYSICAL DECONDITIONING: ICD-10-CM

## 2024-06-24 DIAGNOSIS — Z86.39 HISTORY OF INSULIN DEPENDENT DIABETES MELLITUS: Primary | ICD-10-CM

## 2024-06-24 DIAGNOSIS — Z90.12 HISTORY OF MASTECTOMY, LEFT: ICD-10-CM

## 2024-06-24 DIAGNOSIS — I50.32 CHRONIC DIASTOLIC HEART FAILURE (HCC): ICD-10-CM

## 2024-06-24 DIAGNOSIS — E87.6 HYPOKALEMIA: ICD-10-CM

## 2024-06-24 RX ORDER — ACETAMINOPHEN 325 MG/1
650 TABLET ORAL EVERY 6 HOURS PRN
COMMUNITY

## 2024-06-24 RX ORDER — POTASSIUM CHLORIDE 1500 MG/1
20 TABLET, EXTENDED RELEASE ORAL
COMMUNITY

## 2024-06-24 NOTE — PROGRESS NOTES
Yeny Rust.  [birth year] APN Initial Encounter 24. 145pm    Met with patient at bedside.  and daughter visiting.  Alert & OR x 4.  Report received that family does not check blood sugars.  insisted on taking patient home. Patient was admitted from home post discharge from [facility].  Ordered 4 x day accucheck and re-evaluate DM medications.      Yeny Rust  : 12/3/1945.  78 year old  female [place] [facility] [place]ication management, rehabilitation and strengthening.      Last Hospital Admission:  24  Discharged Home:  6/3/24    Chief complaint: weakness, hyperglycemia, uncontrolled DM. [facility]    Hospital discharge diagnoses:  Gait disturbance  Acute weakness  Type 2 DM w/ hyperglycemia;long term insulin dependent  Generalized weakness  Chronic dCHF  Hypothyroidism  RA  Major depression    High risk for readmission    HPI  Admitted to [facility] for generalized weakness and uncontrolled blood sugars.  Patient was admitted, treated and discharged home on 6/3/24 with family and home health.  declined rehab and wanted patient to return home. Patient later admitted to [facility] on 24 for medication management, PT and OT.    Past Medical History:    Actinic keratosis    Basal cell carcinoma    Breast cancer (HCC)    Invasive Ductal Carcinoma    CAD (coronary artery disease)    Diabetes (HCC)    Essential hypertension    Hyperlipidemia    Hypothyroidism    Muscle weakness    Osteoarthritis    Personal history of antineoplastic chemotherapy    Rheumatoid arthritis involving multiple sites with positive rheumatoid factor (HCC)    Squamous cell carcinoma    Type 2 diabetes mellitus (HCC)     Past Surgical History:   Procedure Laterality Date    Cabg      Colonoscopy  2010    diverticulosis    Implant left  2011    Reconstructive for mastectomy    Mastectomy left Left 9/21/10    Invasive Ductal CA    Other surgical history      basal  cell removal    Removal of tunneled cva device, with subq port or pump, central or peripheral insertion  2/16/2012    Procedure: PORT-A-CATH REMOVAL;  Surgeon: Ozzy Ortiz MD;  [facility]: [facility], [facility]    Tonsillectomy       Family History   Problem Relation Age of Onset    Heart Disorder Father     Heart Disease Father     Cancer Mother         breast    Breast Cancer Mother 65        mom- dx age 60's    Breast Cancer Cousin 45        P 1st  cousin dx age 45    Breast Cancer Maternal Aunt 50        dx age 50's    Stroke Neg      Social History     Socioeconomic History    Marital status:    Tobacco Use    Smoking status: Never    Smokeless tobacco: Never   Vaping Use    Vaping status: Never Used   Substance and Sexual Activity    Alcohol use: No    Drug use: No     Social Determinants of Health     Food Insecurity: No Food Insecurity (5/31/2024)    Food Insecurity     Food Insecurity: Never true   Transportation Needs: No Transportation Needs (5/31/2024)    Transportation Needs     Lack of Transportation: No   Physical Activity: Insufficiently Active (2/25/2020)    Received from [employer]    Exercise Vital Sign     Days of Exercise per Week: 3 days     Minutes of Exercise per Session: 30 min   Housing Stability: Low Risk  (5/31/2024)    Housing Stability     Housing Instability: No     Housing Instability Emergency: No     Immunization History   Administered Date(s) Administered    Covid-19 Vaccine Pfizer 30 mcg/0.3 ml 02/13/2021, 03/06/2021, 11/10/2021    FLU VAC QIV SPLIT 3 YRS AND OLDER (88742) 09/21/2017    FLUAD High Dose 65 yr and older (01308) 10/17/2018    Flublok Quad Influenza Vaccine (90899) 10/08/2019    Fluzone Vaccine Medicare () 09/23/2015, 10/18/2016    HIGH DOSE FLU 65 YRS AND OLDER PRSV FREE SINGLE D (75888) FLU CLINIC 10/02/2014, 09/24/2015, 09/27/2018    Influenza 10/01/2020    Pneumococcal (Prevnar 13) 02/11/2016, 02/12/2016     Pneumovax 23 01/31/2018, 02/01/2018    Zoster Vaccine Live (Zostavax) 06/15/2013    ALLERGIES:  Allergies   Allergen Reactions    Demerol ANAPHYLAXIS     CODE STATUS:  Full Code; no POLST on file.    ADVANCED CARE PLANNING TEAM: Will need family care plan meetings to discuss code status, progress and discharge plans.    CURRENT MEDICATIONS - reviewed and updated on SNF EMAR  Mirtazapine 30 mg at bedtime  Metformin 1000 2 x daily  Metoprolol Succ 50 daily  Mupirocin 2% prn  Toujeo insulin 30 units q evening  Tramadol 50 mg q 8 hrs prn and one at hs  Levothroxine 125 mcg qam  Hydrochlorothiazide 12.5 daily  Atorvastatin 40 at bedtime  MVI daily  Tylenol 650 mg q 6 hrs prn  K+ 20 meq daily     SUBJECTIVE: denies headache, chest pain, SOB, cough.  C/O bilateral leg pain/knee pain. Denies chills, fevers, n/v.    PHYSICAL EXAM: 124/77. P 75. RR 18. POx 97%. T 97.2. wgt 103.4lb  GENERAL HEALTH: well developed, thin female in no acute distress.  LINES, TUBES, DRAINS:  none  SKIN: pale, warm, dry  WOUND: none reported; see wound assessment,   EYES: Pupils round and equal; EOMI, no jaundice. conjunctiva normal; no nystagmus, no drainage from eyes  HENT: no nasal drainage, mucous membranes pink, moist, no thrush.   NECK: supple; FROM  BREAST:  L breast mastectomy; hx breast CA  RESPIRATORY: lungs clear  CARDIOVASCULAR:  RRR, rate 75.  ABDOMEN: soft, nontender. BS+, no guarding, no rebound tenderness.  :Deferred  LYMPHATIC:no lymphedema  MUSCULOSKELETAL:  weakness, unsteady gait; high risk for falls.  EXTREMITIES/VASCULAR: no edema.  NEUROLOGIC:follows commands; impulsive.  PSYCHIATRIC:  hx major depression. OR x 4.    MEDICAL DECISION MAKING; able to make decisions; dghtr &  assist w/ medical decisions.  Jassi .  Dgtr Tianna .    Lab results: 6/20/24.  WBC 9.1. HGB 11.7. plts 346.  CMP:  Na 137. K 3.1 (added K+20 daily) Cl 96. CO2 31. BUN 11. Creat 0.5. gfr >60    A/P    Uncontrolled DM -  insulin dependent -   Toujeo 30 unit q evening  Metformin 1000 2 x daily  Medium sliding scale  Carb controlled diet  A1C 10.5. 4/8/24  Accuchecks qid  Instruct patient and family on DM diet  Dietician to follow    Generalized weakness/ Anemia  Trend labs  PT/OT    Chronic dHF  Metropolol succ 50 mg daily  Hydrochlorothiazide 12.5 mg daily  Trend weight    Low K+ 3.1  Potassium 20 meq daily  Trend labs    Hypothyroidism  -  synthroid 125 mcg daily    Rheumatoid Arthritis (previously on prednisone)  Tylenol prn  Tramadol 50 mg q hs and prn  PT/OT  F/U with rheumatology as recommended    Depression  Remeron 30 mg q hs    Deconditioning:  PT/OT    Dispo:  Return home with family and home health    FOLLOW UP APPOINTMENTS   *Follow-Up with PCP within 1-2 weeks following [facility] discharge.   *Follow-Up with specialists as recommended.    Future Appointments   Date Time Provider Department Center   7/29/2024 11:00 AM Isauro Barroso MD [facility]   10/8/2024 12:20 PM Barrington Mosqueda MD [facility]     *Greater than 45 minutes spent w/ patient and staff, including but not limited to/ reviewing present status, needs, abilities with disciplines, reviewing medical records, vital signs, labs, completing med rec and entering orders to establish plan of care in [facility].       Note to patient: The 21st Century Cures Act makes medical notes like these available to patients in the interest of transparency. However, this is a medical document intended as peer to peer communication. It is written in medical language and may contain abbreviations or verbiage that are unfamiliar. It may appear blunt or direct. Medical documents are intended to carry relevant information, facts as evident, and the clinical opinion of the practitioner who signs the document.    MARTY Alexander  06/20/24  145 pm  [facility] post acute care team

## 2024-06-24 NOTE — PROGRESS NOTES
Yeny Rust. DO [birth year] APN ENcounter 24. 3pm.    Met with patient at bedside sitting; c/o bilateral knee pain.    PT update from today: ambulating 30 feet x 3 reps, assist of 1 ad w/c follow.  Sit to stand with CGA and verbal cues for safety.  Patient able to complete sitting and standing exercises to improve balance, strength and endurance.  FBS 98 this morning.    Yeny Rust  : 12/3/1945.  78 year old  female [place] [facility] [place]  [place]ication management, rehabilitation and strengthening.      Last Hospital Admission:  24  Discharged Home:            6/3/24    Chief complaint today: weakness, hyperglycemia, chronic pain both knees.     Hospital discharge diagnoses:  Gait disturbance  Acute weakness  Type 2 DM w/ hyperglycemia;long term insulin dependent  Generalized weakness  Chronic dCHF  Hypothyroidism  RA  Major depression    High risk for readmission; fall risk    HPI  Admitted to [facility] for generalized weakness and uncontrolled blood sugars.  Patient was admitted, treated and discharged home on 6/3/24 with family and home health.  declined rehab and wanted patient to return home. Patient later admitted to [facility] on 24 for medication management, PT and OT.    Past Medical History:    Actinic keratosis    Basal cell carcinoma    Breast cancer (HCC)    Invasive Ductal Carcinoma    CAD (coronary artery disease)    Diabetes (HCC)    Essential hypertension    Hyperlipidemia    Hypothyroidism    Muscle weakness    Osteoarthritis    Personal history of antineoplastic chemotherapy    Rheumatoid arthritis involving multiple sites with positive rheumatoid factor (HCC)    Squamous cell carcinoma    Type 2 diabetes mellitus (HCC)     Past Surgical History:   Procedure Laterality Date    Cabg      Colonoscopy  2010    diverticulosis    Implant left  2011    Reconstructive for mastectomy    Mastectomy left Left 9/21/10    Invasive Ductal CA     Other surgical history      basal cell removal    Removal of tunneled cva device, with subq port or pump, central or peripheral insertion  2/16/2012    Procedure: PORT-A-CATH REMOVAL;  Surgeon: Ozzy Ortiz MD;  [facility] [facility]    Tonsillectomy       Family History   Problem Relation Age of Onset    Heart Disorder Father     Heart Disease Father     Cancer Mother         breast    Breast Cancer Mother 65        mom- dx age 60's    Breast Cancer Cousin 45        P 1st  cousin dx age 45    Breast Cancer Maternal Aunt 50        dx age 50's    Stroke Neg      Social History     Socioeconomic History    Marital status:    Tobacco Use    Smoking status: Never    Smokeless tobacco: Never   Vaping Use    Vaping status: Never Used   Substance and Sexual Activity    Alcohol use: No    Drug use: No     Social Determinants of Health     Food Insecurity: No Food Insecurity (5/31/2024)    Food Insecurity     Food Insecurity: Never true   Transportation Needs: No Transportation Needs (5/31/2024)    Transportation Needs     Lack of Transportation: No   Physical Activity: Insufficiently Active (2/25/2020)    Received from [employer]    Exercise Vital Sign     Days of Exercise per Week: 3 days     Minutes of Exercise per Session: 30 min   Housing Stability: Low Risk  (5/31/2024)    Housing Stability     Housing Instability: No     Housing Instability Emergency: No     Immunization History   Administered Date(s) Administered    Covid-19 Vaccine Pfizer 30 mcg/0.3 ml 02/13/2021, 03/06/2021, 11/10/2021    FLU VAC QIV SPLIT 3 YRS AND OLDER (36810) 09/21/2017    FLUAD High Dose 65 yr and older (89342) 10/17/2018    Flublok Quad Influenza Vaccine (24038) 10/08/2019    Fluzone Vaccine Medicare () 09/23/2015, 10/18/2016    HIGH DOSE FLU 65 YRS AND OLDER PRSV FREE SINGLE D (98992) FLU CLINIC 10/02/2014, 09/24/2015, 09/27/2018    Influenza 10/01/2020    Pneumococcal (Prevnar  13) 02/11/2016, 02/12/2016    Pneumovax 23 01/31/2018, 02/01/2018    Zoster Vaccine Live (Zostavax) 06/15/2013    ALLERGIES:  Allergies   Allergen Reactions    Demerol ANAPHYLAXIS     CODE STATUS:  Full Code; no POLST form on file.    ADVANCED CARE PLANNING TEAM: Will need family care plan meetings to discuss code status, progress in therapy and discharge plans.    CURRENT MEDICATIONS - reviewed and updated on SNF EMAR  Mirtazapine 30 mg at bedtime  Metformin 1000 2 x daily  Metoprolol Succ 50 daily  Mupirocin 2% prn  Toujeo insulin 30 units q evening  Tramadol 50 mg q 8 hrs prn and one at hs  Levothroxine 125 mcg qam  Hydrochlorothiazide 12.5 daily  Atorvastatin 40 at bedtime  MVI daily  Tylenol 650 mg q 6 hrs prn  K+ 20 meq daily     SUBJECTIVE: denies headache, chest pain, SOB. C/O bilateral leg pain/knee pain. Denies chills, fevers, n/v.    PHYSICAL EXAM: 129/77. P 70. RR 18. POx 97%. T 97.9. wgt 103.5lb  GENERAL HEALTH: well developed, thin female in no acute distress. + chronic pain.  LINES, TUBES, DRAINS:  none  SKIN: pale, warm, dry  WOUND: none reported; see wound assessment per nurses.,   EYES: Pupils round and equal; no jaundice. conjunctiva normal; no drainage from eyes  HENT: no nasal drainage, mucous membranes pink, moist   NECK: supple; FROM  BREAST:  L breast mastectomy; hx breast CA  RESPIRATORY: lungs clear  CARDIOVASCULAR:  RRR, rate 70.  ABDOMEN: soft, nontender. BS+, no guarding.  :Deferred  LYMPHATIC:no lymphedema  MUSCULOSKELETAL:  weakness, unsteady, high risk for falls.  EXTREMITIES/VASCULAR: no edema.  NEUROLOGIC:follows commands; impulsive.  PSYCHIATRIC:  hx major depression. OR x 4.    MEDICAL DECISION MAKING; able to make decisions; dghtr &  assist w/ medical decisions.  Jassi .  Dgtr Tianna .    Lab results: 6/20/24.  WBC 9.1. HGB 11.7. plts 346.  CMP:  Na 137. K 3.1 (added K+20 daily) Cl 96. CO2 31. BUN 11. Creat 0.5. gfr >60    A/P    Uncontrolled  DM - insulin dependent - FBS 98  Toujeo 30 unit q evening  Metformin 1000 2 x daily  Medium sliding scale  Carb controlled diet  A1C 10.5. 4/8/24  Accuchecks qid  Instruct patient and family on DM diet  Dietician to follow    Generalized weakness/ Anemia  Trend labs  PT/OT    Chronic dHF  Metropolol succ 50 mg daily  Hydrochlorothiazide 12.5 mg daily  Trend weight    Low K+ 3.1   - Potassium 20 meq daily    Hypothyroidism  -  synthroid 125 mcg daily    Rheumatoid Arthritis (previously on prednisone)  Tylenol prn  Tramadol 50 mg q hs and prn  PT/OT  F/U with rheumatology as recommended    Depression  -  Remeron 30 mg q hs    Deconditioning:  PT/OT    Dispo:  Return home with family and home health    FOLLOW UP APPOINTMENTS   *Follow-Up with PCP within 1-2 weeks following GAN[facility] discharge.   *Follow-Up with specialists as recommended.    Future Appointments   Date Time Provider Department Center   7/29/2024 11:00 AM Isauro Barroso MD [facility]   10/8/2024 12:20 PM Barrington Mosqueda MD [facility]     *Greater than 35 minutes spent w/ patient and staff, including but not limited to/ reviewing present status, needs, abilities with disciplines, reviewing medical records, vital signs, labs, completing med rec and entering orders to establish plan of care in [facility].       Note to patient: The 21st Century Cures Act makes medical notes like these available to patients in the interest of transparency. However, this is a medical document intended as peer to peer communication. It is written in medical language and may contain abbreviations or verbiage that are unfamiliar. It may appear blunt or direct. Medical documents are intended to carry relevant information, facts as evident, and the clinical opinion of the practitioner who signs the document.    MARTY Alexander  06/21/24  3 pm  [facility] post acute care team

## 2024-06-25 ENCOUNTER — SNF VISIT (OUTPATIENT)
Dept: INTERNAL MEDICINE CLINIC | Age: 79
End: 2024-06-25

## 2024-06-25 DIAGNOSIS — M25.512 ACUTE PAIN OF LEFT SHOULDER: ICD-10-CM

## 2024-06-25 DIAGNOSIS — G89.29 BILATERAL CHRONIC KNEE PAIN: ICD-10-CM

## 2024-06-25 DIAGNOSIS — M25.562 BILATERAL CHRONIC KNEE PAIN: ICD-10-CM
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
## 2024-06-25 DIAGNOSIS — E08.65 DIABETES MELLITUS DUE TO UNDERLYING CONDITION, UNCONTROLLED, WITH HYPERGLYCEMIA (HCC): ICD-10-CM

## 2024-06-25 DIAGNOSIS — R53.1 WEAKNESS GENERALIZED: ICD-10-CM

## 2024-06-25 DIAGNOSIS — R26.9 FUNCTIONAL GAIT DISORDER: ICD-10-CM
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
## 2024-06-25 DIAGNOSIS — Z86.39 HISTORY OF INSULIN DEPENDENT DIABETES MELLITUS: ICD-10-CM

## 2024-06-25 DIAGNOSIS — I50.32 CHRONIC DIASTOLIC HEART FAILURE (HCC): ICD-10-CM

## 2024-06-25 DIAGNOSIS — M25.561 BILATERAL CHRONIC KNEE PAIN: ICD-10-CM

## 2024-06-25 PROCEDURE — 3078F DIAST BP <80 MM HG: CPT | Performed by: NURSE PRACTITIONER

## 2024-06-25 PROCEDURE — 1160F RVW MEDS BY RX/DR IN RCRD: CPT | Performed by: NURSE PRACTITIONER

## 2024-06-25 PROCEDURE — 99309 SBSQ NF CARE MODERATE MDM 30: CPT | Performed by: NURSE PRACTITIONER

## 2024-06-25 PROCEDURE — 1159F MED LIST DOCD IN RCRD: CPT | Performed by: NURSE PRACTITIONER

## 2024-06-25 PROCEDURE — 3074F SYST BP LT 130 MM HG: CPT | Performed by: NURSE PRACTITIONER

## 2024-06-27 ENCOUNTER — SNF VISIT (OUTPATIENT)
Dept: INTERNAL MEDICINE CLINIC | Age: 79
End: 2024-06-27
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
## 2024-06-27 DIAGNOSIS — G89.29 BILATERAL CHRONIC KNEE PAIN: ICD-10-CM

## 2024-06-27 DIAGNOSIS — M25.512 ACUTE PAIN OF LEFT SHOULDER: ICD-10-CM

## 2024-06-27 DIAGNOSIS — M25.561 BILATERAL CHRONIC KNEE PAIN: ICD-10-CM

## 2024-06-27 DIAGNOSIS — I50.32 CHRONIC DIASTOLIC HEART FAILURE (HCC): ICD-10-CM

## 2024-06-27 DIAGNOSIS — R26.9 FUNCTIONAL GAIT DISORDER: ICD-10-CM

## 2024-06-27 DIAGNOSIS — E08.65 DIABETES MELLITUS DUE TO UNDERLYING CONDITION, UNCONTROLLED, WITH HYPERGLYCEMIA (HCC): ICD-10-CM

## 2024-06-27 DIAGNOSIS — R53.1 WEAKNESS GENERALIZED: ICD-10-CM
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
## 2024-06-27 DIAGNOSIS — M25.562 BILATERAL CHRONIC KNEE PAIN: ICD-10-CM

## 2024-06-27 DIAGNOSIS — R53.81 PHYSICAL DECONDITIONING: ICD-10-CM

## 2024-06-27 PROCEDURE — 99309 SBSQ NF CARE MODERATE MDM 30: CPT | Performed by: NURSE PRACTITIONER

## 2024-06-27 PROCEDURE — 3078F DIAST BP <80 MM HG: CPT | Performed by: NURSE PRACTITIONER

## 2024-06-27 PROCEDURE — 3074F SYST BP LT 130 MM HG: CPT | Performed by: NURSE PRACTITIONER

## 2024-07-01 ENCOUNTER — HOSPITAL ENCOUNTER (OUTPATIENT)
Dept: CT IMAGING | Facility: HOSPITAL | Age: 79
Discharge: HOME | End: 2024-07-01
Attending: NURSE PRACTITIONER
Payer: MEDICARE

## 2024-07-01 DIAGNOSIS — R63.4 WEIGHT LOSS: ICD-10-CM

## 2024-07-01 PROCEDURE — 71250 CT THORAX DX C-: CPT | Performed by: NURSE PRACTITIONER

## 2024-07-01 PROCEDURE — 74176 CT ABD & PELVIS W/O CONTRAST: CPT | Performed by: NURSE PRACTITIONER

## 2024-07-02 ENCOUNTER — HOSPITAL ENCOUNTER (EMERGENCY)
Facility: HOSPITAL | Age: 79
Discharge: HOME | End: 2024-07-02
Attending: EMERGENCY MEDICINE
Payer: MEDICARE

## 2024-07-02 ENCOUNTER — APPOINTMENT (OUTPATIENT)
Dept: CT IMAGING | Facility: HOSPITAL | Age: 79
End: 2024-07-02
Attending: EMERGENCY MEDICINE
Payer: MEDICARE

## 2024-07-02 VITALS
HEIGHT: 63 IN | RESPIRATION RATE: 12 BRPM | BODY MASS INDEX: 22.15 KG/M2 | WEIGHT: 125 LBS | SYSTOLIC BLOOD PRESSURE: 126 MMHG | OXYGEN SATURATION: 99 % | DIASTOLIC BLOOD PRESSURE: 64 MMHG | TEMPERATURE: 98 F | HEART RATE: 70 BPM

## 2024-07-02 DIAGNOSIS — S09.90XA INJURY OF HEAD, INITIAL ENCOUNTER: ICD-10-CM

## 2024-07-02 DIAGNOSIS — S00.03XA HEMATOMA OF SCALP, INITIAL ENCOUNTER: ICD-10-CM
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
## 2024-07-02 PROCEDURE — 70450 CT HEAD/BRAIN W/O DYE: CPT | Performed by: EMERGENCY MEDICINE

## 2024-07-02 PROCEDURE — 99284 EMERGENCY DEPT VISIT MOD MDM: CPT

## 2024-07-02 PROCEDURE — 72125 CT NECK SPINE W/O DYE: CPT | Performed by: EMERGENCY MEDICINE

## 2024-07-02 NOTE — ED INITIAL ASSESSMENT (HPI)
Got up to bathroom and \"my feet went out from underneath me\". No LOC, no blood thinners. Hit back of head. No neck or back pain.

## 2024-07-02 NOTE — ED PROVIDER NOTES
Patient Seen in: [facility] Emergency Department      History     Chief Complaint   Patient presents with    Fall     Fall from standing     Stated Complaint: fall    Subjective:   Patient is a 78-year-old female who reports that her legs gave out from underneath her and she fell backwards hitting her head.  Ice is applied to the posterior scalp.  Patient is not on blood thinners she has no focal deficits on exam she denies any pain in her neck on palpation.    The history is provided by the patient and the EMS personnel.           Objective:   Past Medical History:    Actinic keratosis    Basal cell carcinoma    Breast cancer (HCC)    Invasive Ductal Carcinoma    CAD (coronary artery disease)    Diabetes (HCC)    Essential hypertension    Hyperlipidemia    Hypothyroidism    Muscle weakness    Osteoarthritis    Personal history of antineoplastic chemotherapy    Rheumatoid arthritis involving multiple sites with positive rheumatoid factor (HCC)    Squamous cell carcinoma    Type 2 diabetes mellitus (HCC)              Past Surgical History:   Procedure Laterality Date    Cabg      Colonoscopy  6/2010    diverticulosis    Implant left  Nov. 2011    Reconstructive for mastectomy    Mastectomy left Left 9/21/10    Invasive Ductal CA    Other surgical history      basal cell removal    Removal of tunneled cva device, with subq port or pump, central or peripheral insertion  2/16/2012    Procedure: PORT-A-CATH REMOVAL;  Surgeon: Ozzy Ortiz MD;  [facility]    Tonsillectomy                  Social History     Socioeconomic History    Marital status:    Tobacco Use    Smoking status: Never    Smokeless tobacco: Never   Vaping Use    Vaping status: Never Used   Substance and Sexual Activity    Alcohol use: No    Drug use: No     Social Determinants of Health     Food Insecurity: No Food Insecurity (5/31/2024)    Food Insecurity     Food Insecurity: Never true   Transportation Needs: No  Transportation Needs (5/31/2024)    Transportation Needs     Lack of Transportation: No   Physical Activity: Insufficiently Active (2/25/2020)    Received from [employer]    Exercise Vital Sign     Days of Exercise per Week: 3 days     Minutes of Exercise per Session: 30 min   Housing Stability: Low Risk  (5/31/2024)    Housing Stability     Housing Instability: No     Housing Instability Emergency: No              Review of Systems   Neurological:  Positive for headaches.       Positive for stated Chief Complaint: Fall (Fall from standing)    Other systems are as noted in HPI.  Constitutional and vital signs reviewed.      All other systems reviewed and negative except as noted above.    Physical Exam     ED Triage Vitals [07/02/24 0415]   /72   Pulse 78   Resp 14   Temp 97.8 °F (36.6 °C)   Temp src Temporal   SpO2 99 %   O2 Device None (Room air)       Current Vitals:   Vital Signs  BP: 142/72  Pulse: 78  Resp: 14  Temp: 97.8 °F (36.6 °C)  Temp src: Temporal    Oxygen Therapy  SpO2: 99 %  O2 Device: None (Room air)            Physical Exam  Vitals and nursing note reviewed.   Constitutional:       General: She is not in acute distress.     Appearance: Normal appearance. She is not ill-appearing or toxic-appearing.   HENT:      Head:      Comments: Posterior scalp hematoma     Mouth/Throat:      Mouth: Mucous membranes are moist.   Eyes:      Extraocular Movements: Extraocular movements intact.      Pupils: Pupils are equal, round, and reactive to light.   Cardiovascular:      Rate and Rhythm: Normal rate.      Heart sounds: Murmur heard.   Pulmonary:      Effort: Pulmonary effort is normal.      Breath sounds: Normal breath sounds.   Abdominal:      General: Bowel sounds are normal. There is no distension.      Palpations: Abdomen is soft.      Tenderness: There is no abdominal tenderness. There is no guarding.   Musculoskeletal:         General: Normal range of motion.    Skin:     General: Skin is warm.      Capillary Refill: Capillary refill takes less than 2 seconds.   Neurological:      General: No focal deficit present.      Mental Status: She is alert and oriented to person, place, and time.      Cranial Nerves: No cranial nerve deficit.      Sensory: No sensory deficit.   Psychiatric:         Mood and Affect: Mood normal.         Behavior: Behavior normal.               ED Course   Labs Reviewed - No data to display          CT scan shows no acute intracranial injury no acute intracranial hemorrhage mass effect or midline shift.  Mild microangiography involving loss.  Right parietal scalp soft tissue swelling without acute skull fracture.    CT of the cervical spine shows age-indeterminate likely chronic mild anterior wedge compression of T1 vertebral body otherwise no acute fracture or malalignment moderate cervical spondylosis and multilevel facet arthritis 50.  No acute soft tissue injury.         MDM      Social -negative tobacco, negative etoh, negative drugs  Family History-noncontributory  Past Medical History-hypertension, hypothyroidism, diabetes, osteoarthritis, coronary disease, rheumatoid arthritis, breast cancer, hyperlipidemia    Differential diagnosis before testing included fall, head injury, intracranial hemorrhage, fracture, cervical spine fracture    Co-morbidities that add to the complexity of management include: Osteoarthritis    Testing ordered during this visit included CT of the head brain and cervical spine    Radiographic images  I personally reviewed the radiographs and my individual interpretation shows no acute intracranial hemorrhage and no cervical spine acute fracture  I also reviewed the official reports that showed CT scan shows no acute intracranial injury no acute intracranial hemorrhage mass effect or midline shift.  Mild microangiography involving loss.  Right parietal scalp soft tissue swelling without acute skull fracture.    CT of the  cervical spine shows age-indeterminate likely chronic mild anterior wedge compression of T1 vertebral body otherwise no acute fracture or malalignment moderate cervical spondylosis and multilevel facet arthritis 50.  No acute soft tissue injury.    External chart review showed review of care everywhere in epic system shows no related comorbidities to current presentation    History obtained by an independent source included from patient and EMS    Discussion of management with patient    Social determinants of health that affect care include patient is not on blood thinners      Medications Provided: Ice    Course of Events during Emergency Room Visit include 78-year-old female presents emergency room for evaluation of fall and head injury.  Ice is applied to the posterior scalp her c-collar was removed and she is Nexus negative.  Patient CT scan shows no acute intracranial hemorrhage or fracture of the cervical spine.  There is an old compression fracture at T1 but she has no bony tenderness on exam.  It is likely chronic.  Patient follow-up with primary care physician          Disposition:      Discharge  I have discussed with the patient the results of test, differential diagnosis, treatment plan, warning signs and symptoms which should prompt immediate return.  They expressed understanding of these instructions and agrees to the following plan provided.  They were given written discharge instructions and agrees to return for any concerns and voiced understanding and all questions were answered.                                      Medical Decision Making      Disposition and Plan     Clinical Impression:  1. Fall, initial encounter    2. Hematoma of scalp, initial encounter    3. Injury of head, initial encounter         Disposition:  Discharge  7/2/2024  5:29 am    Follow-up:  Thais Echeverria MD  [id number] [address]  [phone]    Schedule an appointment as soon as possible  for a visit            Medications Prescribed:  Current Discharge Medication List

## 2024-07-07 VITALS
OXYGEN SATURATION: 96 % | HEART RATE: 74 BPM | DIASTOLIC BLOOD PRESSURE: 77 MMHG | BODY MASS INDEX: 18 KG/M2 | WEIGHT: 103 LBS | SYSTOLIC BLOOD PRESSURE: 127 MMHG | TEMPERATURE: 98 F

## 2024-07-07 VITALS
OXYGEN SATURATION: 97 % | SYSTOLIC BLOOD PRESSURE: 128 MMHG | DIASTOLIC BLOOD PRESSURE: 79 MMHG | BODY MASS INDEX: 18 KG/M2 | WEIGHT: 102.63 LBS | TEMPERATURE: 97 F | HEART RATE: 72 BPM

## 2024-07-08 NOTE — PROGRESS NOTES
Barrera Yeny.  [birth year] APN Encounter 24. 2:15 pm. Late entry.    Met with patient at bedside;  present.  Requested patient to call for staff help when getting up.  Asked patient's  to avoid transferring patient.  Patient reports left shoulder since recent fall; pain improving.  C/O bilateral knee pain; physiatrist scheduled to administer steroid injections to both knees on 24. .  Patient ambulating 40 feet x 3 reps with 1 assist and w/c following.    .    Yeny Elamrakesh  : 12/3/1945.  78 year old  female is admitted to [facility] for  medication management, rehabilitation and strengthening.      Last Hospital Admission:  24  Discharged Home:            6/3/24    Chief complaint today: weakness, left shoulder pain, chronic pain both knees.     Hospital discharge diagnoses:  Gait disturbance  Acute weakness  Type 2 DM w/ hyperglycemia;long term insulin dependent  Generalized weakness  Chronic dCHF  Hypothyroidism  RA  Major depression    High risk for readmission; high fall risk.      HPI  Admitted to [facility] for generalized weakness and uncontrolled blood sugars.  Patient was admitted, treated and discharged home on 6/3/24 with family and home health.  declined rehab and wanted patient to return home. Patient later admitted to [facility] on 24 for medication management, PT and OT.    Past Medical History:    Actinic keratosis    Basal cell carcinoma    Breast cancer (HCC)    Invasive Ductal Carcinoma    CAD (coronary artery disease)    Diabetes (HCC)    Essential hypertension    Hyperlipidemia    Hypothyroidism    Muscle weakness    Osteoarthritis    Personal history of antineoplastic chemotherapy    Rheumatoid arthritis involving multiple sites with positive rheumatoid factor (HCC)    Squamous cell carcinoma    Type 2 diabetes mellitus (HCC)     Past Surgical History:   Procedure Laterality Date    Cabg      Colonoscopy  2010     diverticulosis    Implant left  Nov. 2011    Reconstructive for mastectomy    Mastectomy left Left 9/21/10    Invasive Ductal CA    Other surgical history      basal cell removal    Removal of tunneled cva device, with subq port or pump, central or peripheral insertion  2/16/2012    Procedure: PORT-A-CATH REMOVAL;  Surgeon: Ozzy Ortiz MD;  Location: [facility], [facility]    Tonsillectomy       Family History   Problem Relation Age of Onset    Heart Disorder Father     Heart Disease Father     Cancer Mother         breast    Breast Cancer Mother 65        mom- dx age 60's    Breast Cancer Cousin 45        P 1st  cousin dx age 45    Breast Cancer Maternal Aunt 50        dx age 50's    Stroke Neg      Social History     Socioeconomic History    Marital status:    Tobacco Use    Smoking status: Never    Smokeless tobacco: Never   Vaping Use    Vaping status: Never Used   Substance and Sexual Activity    Alcohol use: No    Drug use: No     Social Determinants of Health     Food Insecurity: No Food Insecurity (5/31/2024)    Food Insecurity     Food Insecurity: Never true   Transportation Needs: No Transportation Needs (5/31/2024)    Transportation Needs     Lack of Transportation: No   Physical Activity: Insufficiently Active (2/25/2020)    Received from [employer], [facility]    Exercise Vital Sign     Days of Exercise per Week: 3 days     Minutes of Exercise per Session: 30 min   Housing Stability: Low Risk  (5/31/2024)    Housing Stability     Housing Instability: No     Housing Instability Emergency: No     Immunization History   Administered Date(s) Administered    Covid-19 Vaccine Pfizer 30 mcg/0.3 ml 02/13/2021, 03/06/2021, 11/10/2021    FLU VAC QIV SPLIT 3 YRS AND OLDER (48274) 09/21/2017    FLUAD High Dose 65 yr and older (49670) 10/17/2018    Flublok Quad Influenza Vaccine (72630) 10/08/2019    Fluzone Vaccine Medicare () 09/23/2015, 10/18/2016    HIGH DOSE FLU 65 YRS  AND OLDER PRSV FREE SINGLE D (69874) FLU CLINIC 10/02/2014, 09/24/2015, 09/27/2018    Influenza 10/01/2020    Pneumococcal (Prevnar 13) 02/11/2016, 02/12/2016    Pneumovax 23 01/31/2018, 02/01/2018    Zoster Vaccine Live (Zostavax) 06/15/2013    ALLERGIES:  Allergies   Allergen Reactions    Demerol ANAPHYLAXIS     CODE STATUS:  Full Code; no POLST form on file.    ADVANCED CARE PLANNING TEAM: Will need family care plan meetings to discuss code status, progress in therapy and discharge plans.    CURRENT MEDICATIONS - reviewed and updated on SNF EMAR  Mirtazapine 30 mg at bedtime  Metformin 1000 2 x daily  Metoprolol Succ 50 daily  Mupirocin 2%  completed.  Toujeo insulin 30 units q evening  Tramadol 50 mg q 8 hrs prn and one at hs  Levothroxine 125 mcg qam  Hydrochlorothiazide 12.5 daily  Atorvastatin 40 at bedtime  MVI daily  Tylenol 650 mg q 6 hrs prn  K+ 20 meq daily     SUBJECTIVE: denies headache, chest pain, SOB, cough.  C/O left shoulder pain improving   C/O bilateral leg pain/knee pain. Denies chills, fevers, n/v.    PHYSICAL EXAM:  128/79. P 72.  RR 18. POx 96%. T 97.3. wgt 102.6#  GENERAL HEALTH: well developed, thin female w/ chronic pain.  LINES, TUBES, DRAINS:  none  SKIN: pale, warm, dry   WOUND: none reported; see wound assessment per nurses.,   EYES: Pupils round and equal; no jaundice. conjunctiva normal; no drainage from eyes  HENT: no nasal drainage, mucous membranes pink, moist   NECK: supple; FROM  BREAST:  L breast mastectomy; hx breast CA  RESPIRATORY: lungs clear  CARDIOVASCULAR:  RRR, rate 72.  ABDOMEN: soft, nontender. BS+, no guarding.  :Deferred  LYMPHATIC:no lymphedema  MUSCULOSKELETAL:  weakness, unsteady, poor balance; high risk for falls; recent fall 6/22.  EXTREMITIES/VASCULAR: no edema.  L shoulder pain d/t  recent fall.  NEUROLOGIC:follows commands; impulsive; avoids asking for assistance.   PSYCHIATRIC:  hx major depression. OR x 4.    MEDICAL DECISION MAKING; able to make  decisions; dghtr &  assist w/ medical decisions.  Jassi .  Dgtr Tianna .    Last Lab results:  WBC 9. HGB 12. plts 433.  CMP:  Na 134. K 3.6.  Cl 95. CO2 30. BUN 12. Creat 0.6. gfr >60    A/P    Left Shoulder Pain s/p fall  Xray negative  PT/OT  Pain control    Bilateral knee pain  Physiatrist scheduled to administer steroid injections both knees 6/28/24  Pain control    Uncontrolled DM - insulin dependent -   Toujeo 30 unit q evening  Metformin 1000 2 x daily  Medium sliding scale  Carb controlled diet  A1C 10.5. 4/8/24  Accuchecks qid  Instruct patient and family on DM diet  Dietician following.    Generalized weakness/ Anemia  -  12.  Trend labs    Chronic dHF  Metropolol succ 50 mg daily  Hydrochlorothiazide 12.5 mg daily  Trend weights    Low K+ 3.1 up to  3.6. Potassium 20 meq daily    Hypothyroidism  -  synthroid 125 mcg daily    Rheumatoid Arthritis (previously on prednisone)  Tylenol prn  Tramadol 50 mg q hs and prn  PT/OT  F/U with rheumatology as recommended    Depression  -  Remeron 30 mg q hs    Deconditioning:  PT/OT    Dispo:  Return home with family and home health    FOLLOW UP APPOINTMENTS   *Follow-Up with PCP within 1-2 weeks following GANESH discharge.   *Follow-Up with specialists as recommended.    Future Appointments   Date Time Provider Department Center   7/29/2024 11:00 AM Isauro Barroso MD [facility]   10/8/2024 12:20 PM [facility]     *Greater than 35 minutes spent w/ patient and staff, including but not limited to/ reviewing present status, needs, abilities with disciplines, reviewing medical records, vital signs, labs, completing med rec and entering orders to establish plan of care in GANESH.       Note to patient: The 21st Century Cures Act makes medical notes like these available to patients in the interest of transparency. However, this is a medical document intended as peer to peer communication. It  is written in medical language and may contain abbreviations or verbiage that are unfamiliar. It may appear blunt or direct. Medical documents are intended to carry relevant information, facts as evident, and the clinical opinion of the practitioner who signs the document.    MARTY Alexander  06/27/24   215 pm  [facility] post acute care team

## 2024-07-08 NOTE — PROGRESS NOTES
Yeny Rust    [birth year] APN Encounter 24.    Late entry.    Met with patient at bedside.  Staff reported patient fall on 24 while  was trying to transfer patient.  Patient lost her balance and fell.  Patient c/o  left should pain since recent fall;  Left shoulder xray negative.   Patient taking tramadol 50 mg q 8 hrs for pain and one tab at hs.  Patient reports pain medication relieves her pain.      Patient also c/o bilateral knee pain.  Physiatrist following anticipating steroid injections, if appropriate.  Patient ambulating 40 feet x 3 reps with 1 assist and w/c following.    FBS 86 this morning.    Yeny Tse Barrera  : 12/3/1945.  78 year old  female is admitted to [facility] for  medication management, rehabilitation and strengthening.      Last Hospital Admission:  24  Discharged Home:            6/3/24    Chief complaint today: weakness, left shoulder pain, chronic pain both knees.     Hospital discharge diagnoses:  Gait disturbance  Acute weakness  Type 2 DM w/ hyperglycemia;long term insulin dependent  Generalized weakness  Chronic dCHF  Hypothyroidism  RA  Major depression    High risk for readmission; high fall risk.      HPI  Admitted to [facility] for generalized weakness and uncontrolled blood sugars.  Patient was admitted, treated and discharged home on 6/3/24 with family and home health.  declined rehab and wanted patient to return home. Patient later admitted to [facility] on 24 for medication management, PT and OT.    Past Medical History:    Actinic keratosis    Basal cell carcinoma    Breast cancer (HCC)    Invasive Ductal Carcinoma    CAD (coronary artery disease)    Diabetes (HCC)    Essential hypertension    Hyperlipidemia    Hypothyroidism    Muscle weakness    Osteoarthritis    Personal history of antineoplastic chemotherapy    Rheumatoid arthritis involving multiple sites with positive rheumatoid factor (HCC)    Squamous  cell carcinoma    Type 2 diabetes mellitus (HCC)     Past Surgical History:   Procedure Laterality Date    Cabg      Colonoscopy  6/2010    diverticulosis    Implant left  Nov. 2011    Reconstructive for mastectomy    Mastectomy left Left 9/21/10    Invasive Ductal CA    Other surgical history      basal cell removal    Removal of tunneled cva device, with subq port or pump, central or peripheral insertion  2/16/2012    Procedure: PORT-A-CATH REMOVAL;  Surgeon: Ozzy Ortiz MD;  [facility]    Tonsillectomy       Family History   Problem Relation Age of Onset    Heart Disorder Father     Heart Disease Father     Cancer Mother         breast    Breast Cancer Mother 65        mom- dx age 60's    Breast Cancer Cousin 45        P 1st  cousin dx age 45    Breast Cancer Maternal Aunt 50        dx age 50's    Stroke Neg      Social History     Socioeconomic History    Marital status:    Tobacco Use    Smoking status: Never    Smokeless tobacco: Never   Vaping Use    Vaping status: Never Used   Substance and Sexual Activity    Alcohol use: No    Drug use: No     Social Determinants of Health     Food Insecurity: No Food Insecurity (5/31/2024)    Food Insecurity     Food Insecurity: Never true   Transportation Needs: No Transportation Needs (5/31/2024)    Transportation Needs     Lack of Transportation: No   Physical Activity: Insufficiently Active (2/25/2020)    Received from [employer], [facility]    Exercise Vital Sign     Days of Exercise per Week: 3 days     Minutes of Exercise per Session: 30 min   Housing Stability: Low Risk  (5/31/2024)    Housing Stability     Housing Instability: No     Housing Instability Emergency: No     Immunization History   Administered Date(s) Administered    Covid-19 Vaccine Pfizer 30 mcg/0.3 ml 02/13/2021, 03/06/2021, 11/10/2021    FLU VAC QIV SPLIT 3 YRS AND OLDER (72708) 09/21/2017    FLUAD High Dose 65 yr and older (70506)  10/17/2018    Flublok Quad Influenza Vaccine (87968) 10/08/2019    Fluzone Vaccine Medicare () 09/23/2015, 10/18/2016    HIGH DOSE FLU 65 YRS AND OLDER PRSV FREE SINGLE D (68450) FLU CLINIC 10/02/2014, 09/24/2015, 09/27/2018    Influenza 10/01/2020    Pneumococcal (Prevnar 13) 02/11/2016, 02/12/2016    Pneumovax 23 01/31/2018, 02/01/2018    Zoster Vaccine Live (Zostavax) 06/15/2013    ALLERGIES:  Allergies   Allergen Reactions    Demerol ANAPHYLAXIS     CODE STATUS:  Full Code; no POLST form on file.    ADVANCED CARE PLANNING TEAM: Will need family care plan meetings to discuss code status, progress in therapy and discharge plans.    CURRENT MEDICATIONS - reviewed and updated on SNF EMAR  Mirtazapine 30 mg at bedtime  Metformin 1000 2 x daily  Metoprolol Succ 50 daily  Mupirocin 2%  completed.  Toujeo insulin 30 units q evening  Tramadol 50 mg q 8 hrs prn and one at hs  Levothroxine 125 mcg qam  Hydrochlorothiazide 12.5 daily  Atorvastatin 40 at bedtime  MVI daily  Tylenol 650 mg q 6 hrs prn  K+ 20 meq daily     SUBJECTIVE: denies headache, chest pain.  C/O left shoulder pain. C/O bilateral leg pain/knee pain. Denies chills, fevers, n/v.    PHYSICAL EXAM:  127/77.  P 74. RR 18. POx 96%. T 97.9. wgt 103#  GENERAL HEALTH: well developed, thin female w/ chronic pain.  LINES, TUBES, DRAINS:  none  SKIN: pale, warm, dry   WOUND: none reported; see wound assessment per nurses.,   EYES: Pupils round and equal; no jaundice. conjunctiva normal; no drainage from eyes  HENT: no nasal drainage, mucous membranes pink, moist   NECK: supple; FROM  BREAST:  L breast mastectomy; hx breast CA  RESPIRATORY: lungs clear  CARDIOVASCULAR:  RRR, rate 74.  ABDOMEN: soft, nontender. BS+, no guarding.  :Deferred  LYMPHATIC:no lymphedema  MUSCULOSKELETAL:  weakness, unsteady, high risk for falls; recent fall 6/22.  EXTREMITIES/VASCULAR: no edema.  NEUROLOGIC:follows commands; impulsive; avoids asking for assistance.   PSYCHIATRIC:  hx  major depression. OR x 4.    MEDICAL DECISION MAKING; able to make decisions; dghtr &  assist w/ medical decisions.  Jassi .  Dgtr Tianna .    Last Lab results:  WBC 9. HGB 12. plts 433.  CMP:  Na 134. K 3.6.  Cl 95. CO2 30. BUN 12. Creat 0.6. gfr >60    A/P    Uncontrolled DM - insulin dependent - FBS 86  Toujeo 30 unit q evening  Metformin 1000 2 x daily  Medium sliding scale  Carb controlled diet  A1C 10.5. 4/8/24  Accuchecks qid  Instruct patient and family on DM diet  Dietician following.    Generalized weakness/ Anemia  -  12.  Trend labs    Chronic dHF  Metropolol succ 50 mg daily  Hydrochlorothiazide 12.5 mg daily  Trend weights    Low K+ 3.1 up to  3.6. Potassium 20 meq daily    Hypothyroidism  -  synthroid 125 mcg daily    Rheumatoid Arthritis (previously on prednisone)  Tylenol prn  Tramadol 50 mg q hs and prn  PT/OT  F/U with rheumatology as recommended    Depression  -  Remeron 30 mg q hs    Deconditioning:  PT/OT    Dispo:  Return home with family and home health    FOLLOW UP APPOINTMENTS   *Follow-Up with PCP within 1-2 weeks following GANESH discharge.   *Follow-Up with specialists as recommended.    Future Appointments   Date Time Provider Department Center   7/29/2024 11:00 AM Isauro Barroso MD [facility]   10/8/2024 12:20 PM [facility]     *Greater than 35 minutes spent w/ patient and staff, including but not limited to/ reviewing present status, needs, abilities with disciplines, reviewing medical records, vital signs, labs, completing med rec and entering orders to establish plan of care in GANESH.       Note to patient: The 21st Century Cures Act makes medical notes like these available to patients in the interest of transparency. However, this is a medical document intended as peer to peer communication. It is written in medical language and may contain abbreviations or verbiage that are unfamiliar. It may appear  blunt or direct. Medical documents are intended to carry relevant information, facts as evident, and the clinical opinion of the practitioner who signs the document.    MARTY Alexander  06/25/24  1115 pm  [facility] post acute care team

## 2025-01-08 ENCOUNTER — APPOINTMENT (OUTPATIENT)
Dept: GENERAL RADIOLOGY | Facility: HOSPITAL | Age: 80
End: 2025-01-08
Attending: EMERGENCY MEDICINE
Payer: MEDICARE

## 2025-01-08 ENCOUNTER — HOSPITAL ENCOUNTER (INPATIENT)
Facility: HOSPITAL | Age: 80
LOS: 5 days | Discharge: HOME HEALTH | End: 2025-01-13
Attending: EMERGENCY MEDICINE | Admitting: HOSPITALIST
Payer: MEDICARE

## 2025-01-08 DIAGNOSIS — J18.9 COMMUNITY ACQUIRED PNEUMONIA OF RIGHT UPPER LOBE OF LUNG: Primary | ICD-10-CM

## 2025-01-08 DIAGNOSIS — R09.02 HYPOXIA: ICD-10-CM

## 2025-01-08 LAB
ALBUMIN SERPL-MCNC: 4 G/DL (ref 3.2–4.8)
ALBUMIN/GLOB SERPL: 1 {RATIO} (ref 1–2)
ALP LIVER SERPL-CCNC: 91 U/L
ALT SERPL-CCNC: <7 U/L
ANION GAP SERPL CALC-SCNC: 9 MMOL/L (ref 0–18)
AST SERPL-CCNC: 15 U/L (ref ?–34)
BASOPHILS # BLD AUTO: 0.08 X10(3) UL (ref 0–0.2)
BASOPHILS NFR BLD AUTO: 0.5 %
BILIRUB SERPL-MCNC: 0.3 MG/DL (ref 0.2–1.1)
BILIRUB UR QL STRIP.AUTO: NEGATIVE
BUN BLD-MCNC: 9 MG/DL (ref 9–23)
CALCIUM BLD-MCNC: 9.6 MG/DL (ref 8.7–10.4)
CHLORIDE SERPL-SCNC: 97 MMOL/L (ref 98–112)
CLARITY UR REFRACT.AUTO: CLEAR
CO2 SERPL-SCNC: 26 MMOL/L (ref 21–32)
COLOR UR AUTO: COLORLESS
CREAT BLD-MCNC: 0.8 MG/DL
EGFRCR SERPLBLD CKD-EPI 2021: 75 ML/MIN/1.73M2 (ref 60–?)
EOSINOPHIL # BLD AUTO: 0.09 X10(3) UL (ref 0–0.7)
EOSINOPHIL NFR BLD AUTO: 0.5 %
ERYTHROCYTE [DISTWIDTH] IN BLOOD BY AUTOMATED COUNT: 13.9 %
EST. AVERAGE GLUCOSE BLD GHB EST-MCNC: 240 MG/DL (ref 68–126)
FLUAV + FLUBV RNA SPEC NAA+PROBE: NEGATIVE
FLUAV + FLUBV RNA SPEC NAA+PROBE: NEGATIVE
GLOBULIN PLAS-MCNC: 4.2 G/DL (ref 2–3.5)
GLUCOSE BLD-MCNC: 217 MG/DL (ref 70–99)
GLUCOSE BLD-MCNC: 286 MG/DL (ref 70–99)
GLUCOSE BLD-MCNC: 297 MG/DL (ref 70–99)
GLUCOSE BLD-MCNC: 335 MG/DL (ref 70–99)
GLUCOSE UR STRIP.AUTO-MCNC: >1000 MG/DL
HBA1C MFR BLD: 10 % (ref ?–5.7)
HCT VFR BLD AUTO: 36.1 %
HGB BLD-MCNC: 11.7 G/DL
IMM GRANULOCYTES # BLD AUTO: 0.08 X10(3) UL (ref 0–1)
IMM GRANULOCYTES NFR BLD: 0.5 %
KETONES UR STRIP.AUTO-MCNC: NEGATIVE MG/DL
LACTATE SERPL-SCNC: 1.8 MMOL/L (ref 0.5–2)
LEUKOCYTE ESTERASE UR QL STRIP.AUTO: NEGATIVE
LYMPHOCYTES # BLD AUTO: 1.53 X10(3) UL (ref 1–4)
LYMPHOCYTES NFR BLD AUTO: 8.8 %
MCH RBC QN AUTO: 27.6 PG (ref 26–34)
MCHC RBC AUTO-ENTMCNC: 32.4 G/DL (ref 31–37)
MCV RBC AUTO: 85.1 FL
MONOCYTES # BLD AUTO: 1.15 X10(3) UL (ref 0.1–1)
MONOCYTES NFR BLD AUTO: 6.6 %
NEUTROPHILS # BLD AUTO: 14.38 X10 (3) UL (ref 1.5–7.7)
NEUTROPHILS # BLD AUTO: 14.38 X10(3) UL (ref 1.5–7.7)
NEUTROPHILS NFR BLD AUTO: 83.1 %
NITRITE UR QL STRIP.AUTO: NEGATIVE
NT-PROBNP SERPL-MCNC: 1774 PG/ML (ref ?–450)
OSMOLALITY SERPL CALC.SUM OF ELEC: 286 MOSM/KG (ref 275–295)
PH UR STRIP.AUTO: 7 [PH] (ref 5–8)
PLATELET # BLD AUTO: 451 10(3)UL (ref 150–450)
POTASSIUM SERPL-SCNC: 3.8 MMOL/L (ref 3.5–5.1)
PROT SERPL-MCNC: 8.2 G/DL (ref 5.7–8.2)
PROT UR STRIP.AUTO-MCNC: 20 MG/DL
RBC # BLD AUTO: 4.24 X10(6)UL
RBC UR QL AUTO: NEGATIVE
RSV RNA SPEC NAA+PROBE: NEGATIVE
SARS-COV-2 RNA RESP QL NAA+PROBE: NOT DETECTED
SODIUM SERPL-SCNC: 132 MMOL/L (ref 136–145)
SP GR UR STRIP.AUTO: 1.01 (ref 1–1.03)
TROPONIN I SERPL HS-MCNC: 24 NG/L
UROBILINOGEN UR STRIP.AUTO-MCNC: NORMAL MG/DL
WBC # BLD AUTO: 17.3 X10(3) UL (ref 4–11)

## 2025-01-08 PROCEDURE — 81001 URINALYSIS AUTO W/SCOPE: CPT | Performed by: EMERGENCY MEDICINE

## 2025-01-08 PROCEDURE — 87040 BLOOD CULTURE FOR BACTERIA: CPT | Performed by: EMERGENCY MEDICINE

## 2025-01-08 PROCEDURE — 93005 ELECTROCARDIOGRAM TRACING: CPT

## 2025-01-08 PROCEDURE — 80053 COMPREHEN METABOLIC PANEL: CPT | Performed by: EMERGENCY MEDICINE

## 2025-01-08 PROCEDURE — 85025 COMPLETE CBC W/AUTO DIFF WBC: CPT | Performed by: EMERGENCY MEDICINE

## 2025-01-08 PROCEDURE — 96367 TX/PROPH/DG ADDL SEQ IV INF: CPT

## 2025-01-08 PROCEDURE — 83880 ASSAY OF NATRIURETIC PEPTIDE: CPT | Performed by: EMERGENCY MEDICINE

## 2025-01-08 PROCEDURE — 0241U SARS-COV-2/FLU A AND B/RSV BY PCR (GENEXPERT): CPT | Performed by: EMERGENCY MEDICINE

## 2025-01-08 PROCEDURE — 83605 ASSAY OF LACTIC ACID: CPT | Performed by: EMERGENCY MEDICINE

## 2025-01-08 PROCEDURE — 83036 HEMOGLOBIN GLYCOSYLATED A1C: CPT | Performed by: HOSPITALIST

## 2025-01-08 PROCEDURE — 84484 ASSAY OF TROPONIN QUANT: CPT | Performed by: EMERGENCY MEDICINE

## 2025-01-08 PROCEDURE — 82962 GLUCOSE BLOOD TEST: CPT

## 2025-01-08 PROCEDURE — 96365 THER/PROPH/DIAG IV INF INIT: CPT

## 2025-01-08 PROCEDURE — 71045 X-RAY EXAM CHEST 1 VIEW: CPT | Performed by: EMERGENCY MEDICINE

## 2025-01-08 PROCEDURE — 96361 HYDRATE IV INFUSION ADD-ON: CPT

## 2025-01-08 PROCEDURE — 93010 ELECTROCARDIOGRAM REPORT: CPT

## 2025-01-08 PROCEDURE — 99291 CRITICAL CARE FIRST HOUR: CPT

## 2025-01-08 PROCEDURE — 99285 EMERGENCY DEPT VISIT HI MDM: CPT

## 2025-01-08 PROCEDURE — 36415 COLL VENOUS BLD VENIPUNCTURE: CPT

## 2025-01-08 RX ORDER — METOPROLOL SUCCINATE 50 MG/1
50 TABLET, EXTENDED RELEASE ORAL
Status: DISCONTINUED | OUTPATIENT
Start: 2025-01-08 | End: 2025-01-13

## 2025-01-08 RX ORDER — SENNOSIDES 8.6 MG
17.2 TABLET ORAL NIGHTLY PRN
Status: DISCONTINUED | OUTPATIENT
Start: 2025-01-08 | End: 2025-01-13

## 2025-01-08 RX ORDER — ACETAMINOPHEN 325 MG/1
650 TABLET ORAL DAILY PRN
Status: DISCONTINUED | OUTPATIENT
Start: 2025-01-08 | End: 2025-01-13

## 2025-01-08 RX ORDER — SODIUM CHLORIDE 9 MG/ML
INJECTION, SOLUTION INTRAVENOUS CONTINUOUS
Status: DISCONTINUED | OUTPATIENT
Start: 2025-01-08 | End: 2025-01-09

## 2025-01-08 RX ORDER — BISACODYL 10 MG
10 SUPPOSITORY, RECTAL RECTAL
Status: DISCONTINUED | OUTPATIENT
Start: 2025-01-08 | End: 2025-01-13

## 2025-01-08 RX ORDER — METOCLOPRAMIDE HYDROCHLORIDE 5 MG/ML
5 INJECTION INTRAMUSCULAR; INTRAVENOUS EVERY 8 HOURS PRN
Status: DISCONTINUED | OUTPATIENT
Start: 2025-01-08 | End: 2025-01-13

## 2025-01-08 RX ORDER — ONDANSETRON 2 MG/ML
4 INJECTION INTRAMUSCULAR; INTRAVENOUS EVERY 6 HOURS PRN
Status: DISCONTINUED | OUTPATIENT
Start: 2025-01-08 | End: 2025-01-13

## 2025-01-08 RX ORDER — POLYETHYLENE GLYCOL 3350 17 G/17G
17 POWDER, FOR SOLUTION ORAL DAILY PRN
Status: DISCONTINUED | OUTPATIENT
Start: 2025-01-08 | End: 2025-01-13

## 2025-01-08 RX ORDER — NICOTINE POLACRILEX 4 MG
30 LOZENGE BUCCAL
Status: DISCONTINUED | OUTPATIENT
Start: 2025-01-08 | End: 2025-01-13

## 2025-01-08 RX ORDER — SODIUM PHOSPHATE, DIBASIC AND SODIUM PHOSPHATE, MONOBASIC 7; 19 G/230ML; G/230ML
1 ENEMA RECTAL ONCE AS NEEDED
Status: DISCONTINUED | OUTPATIENT
Start: 2025-01-08 | End: 2025-01-13

## 2025-01-08 RX ORDER — ATORVASTATIN CALCIUM 40 MG/1
40 TABLET, FILM COATED ORAL NIGHTLY
Status: DISCONTINUED | OUTPATIENT
Start: 2025-01-08 | End: 2025-01-13

## 2025-01-08 RX ORDER — HEPARIN SODIUM 5000 [USP'U]/ML
5000 INJECTION, SOLUTION INTRAVENOUS; SUBCUTANEOUS EVERY 12 HOURS SCHEDULED
Status: DISCONTINUED | OUTPATIENT
Start: 2025-01-08 | End: 2025-01-13

## 2025-01-08 RX ORDER — TRAMADOL HYDROCHLORIDE 50 MG/1
50 TABLET ORAL DAILY PRN
Status: DISCONTINUED | OUTPATIENT
Start: 2025-01-08 | End: 2025-01-13

## 2025-01-08 RX ORDER — LEVOTHYROXINE SODIUM 125 UG/1
125 TABLET ORAL
Status: DISCONTINUED | OUTPATIENT
Start: 2025-01-08 | End: 2025-01-13

## 2025-01-08 RX ORDER — POTASSIUM CHLORIDE 14.9 MG/ML
20 INJECTION INTRAVENOUS ONCE
Status: COMPLETED | OUTPATIENT
Start: 2025-01-08 | End: 2025-01-08

## 2025-01-08 RX ORDER — DEXTROSE MONOHYDRATE 25 G/50ML
50 INJECTION, SOLUTION INTRAVENOUS
Status: DISCONTINUED | OUTPATIENT
Start: 2025-01-08 | End: 2025-01-13

## 2025-01-08 RX ORDER — INSULIN DEGLUDEC 100 U/ML
13 INJECTION, SOLUTION SUBCUTANEOUS NIGHTLY
Status: DISCONTINUED | OUTPATIENT
Start: 2025-01-08 | End: 2025-01-13

## 2025-01-08 RX ORDER — ECHINACEA PURPUREA EXTRACT 125 MG
1 TABLET ORAL
Status: DISCONTINUED | OUTPATIENT
Start: 2025-01-08 | End: 2025-01-13

## 2025-01-08 RX ORDER — NICOTINE POLACRILEX 4 MG
15 LOZENGE BUCCAL
Status: DISCONTINUED | OUTPATIENT
Start: 2025-01-08 | End: 2025-01-13

## 2025-01-08 NOTE — PLAN OF CARE
Assumed care of patient at 1450, pt resting in bed. VSS, afebrile, O2 sat 97% on 2 LNC, IVF and K rider infusing. Call light in reach, bed low & locked, bed alarm on,  at bedside.

## 2025-01-08 NOTE — H&P
[facility]     Yeny Rust Patient Status:  Inpatient    12/3/1945 MRN [id number]   Location [facility] 0SW-A Attending Ranjeet Cruz MD   Hosp Day # 0 PCP Thais Echeverria MD     Chief Complaint: Generalized weakness fatigue cough    Subjective:    History of Present Illness:     Yeny Rust is a 79 year old female with multiple medical problems including diabetes type 2 rheumatoid arthritis history of breast cancer hypothyroidism hyperlipidemia hypertension coronary artery disease who lives at home with her .  Patient underwent rehab at [facility] 6 months ago without significant improvement as per .  Her  was very concerned about her as for the past couple of days she became more weak and confused and fatigued to the point that she could not get up from the bed last night.  He called paramedics who brought her in and upon being evaluated in the emergency room she was found to have right upper lobe and middle lobe pneumonia.    History/Other:    Past Medical History:  Past Medical History:    Actinic keratosis    Basal cell carcinoma    Breast cancer (HCC)    Invasive Ductal Carcinoma    CAD (coronary artery disease)    Diabetes (HCC)    Essential hypertension    Hyperlipidemia    Hypothyroidism    Muscle weakness    Osteoarthritis    Personal history of antineoplastic chemotherapy    Rheumatoid arthritis involving multiple sites with positive rheumatoid factor (HCC)    Squamous cell carcinoma    Type 2 diabetes mellitus (HCC)     Past Surgical History:   Past Surgical History:   Procedure Laterality Date    Cabg      Colonoscopy  2010    diverticulosis    Implant left  2011    Reconstructive for mastectomy    Mastectomy left Left 9/21/10    Invasive Ductal CA    Other surgical history      basal cell removal    Removal of tunneled cva device, with subq port or pump, central or peripheral insertion  2012     Procedure: PORT-A-CATH REMOVAL;  Surgeon: Ozzy Ortiz MD;  Location: [facility], [facility]    Tonsillectomy        Family History:   Family History   Problem Relation Age of Onset    Heart Disorder Father     Heart Disease Father     Cancer Mother         breast    Breast Cancer Mother 65        mom- dx age 60's    Breast Cancer Cousin 45        P 1st  cousin dx age 45    Breast Cancer Maternal Aunt 50        dx age 50's    Stroke Neg      Social History:    reports that she has never smoked. She has never used smokeless tobacco. She reports that she does not drink alcohol and does not use drugs.     Allergies: Allergies[1]    Medications:  Medications Ordered Prior to Encounter[2]    Review of Systems:   A comprehensive review of systems was completed.    Pertinent positives and negatives noted in the HPI.    Objective:   Physical Exam:    BP (!) 152/107 (BP Location: Right arm)   Pulse 107   Temp 97.7 °F (36.5 °C) (Oral)   Resp (!) 181   Ht 5' 3\" (1.6 m)   Wt 120 lb (54.4 kg)   SpO2 (!) 64%   BMI 21.26 kg/m²   General: No acute distress, Alert  Respiratory: No rhonchi, no wheezes  Cardiovascular: S1, S2. Regular rate and rhythm  Abdomen: Soft, Non-tender, non-distended, positive bowel sounds  Neuro: No new focal deficits  Extremities: No edema      Results:    Labs:      Labs Last 24 Hours:    Recent Labs   Lab 01/08/25  0608   RBC 4.24   HGB 11.7*   HCT 36.1   MCV 85.1   MCH 27.6   MCHC 32.4   RDW 13.9   NEPRELIM 14.38*   WBC 17.3*   .0*       Recent Labs   Lab 01/08/25  0546   *   BUN 9   CREATSERUM 0.80   EGFRCR 75   CA 9.6   ALB 4.0   *   K 3.8   CL 97*   CO2 26.0   ALKPHO 91   AST 15   ALT <7*   BILT 0.3   TP 8.2       Estimated Glomerular Filtration Rate: 75.1 mL/min/1.73m2 (by CKD-EPI based on SCr of 0.8 mg/dL).    No results found for: \"PT\", \"INR\"    Recent Labs   Lab 01/08/25  0546   TROPHS 24       Recent Labs   Lab 01/08/25  0546   PBNP 1,774*       No results for  input(s): \"PCT\" in the last 168 hours.    Imaging: Imaging data reviewed in Epic.    Assessment & Plan:      # 79 years old female who lives at home with her  who is her primary caregiver  Presents with generalized weakness fatigue and cough-  Started on ceftriaxone and Zithromax in the emergency room for community-acquired pneumonia    # History of breast cancer status post left mastectomy    # Diabetes type 2 uncontrolled    # History of CAD and aortic stenosis    # Chronic diastolic CHF        Plan of care discussed with patient and ED physician    Deisi Bass MD    Supplementary Documentation:     The 21st Century Cures Act makes medical notes like these available to patients in the interest of transparency. Please be advised this is a medical document. Medical documents are intended to carry relevant information, facts as evident, and the clinical opinion of the practitioner. The medical note is intended as peer to peer communication and may appear blunt or direct. It is written in medical language and may contain abbreviations or verbiage that are unfamiliar.               **Certification      PHYSICIAN Certification of Need for Inpatient Hospitalization - Initial Certification    Patient will require inpatient services that will reasonably be expected to span two midnight's based on the clinical documentation in H+P.   Based on patients current state of illness, I anticipate that, after discharge, patient will require TBD.                        [1]   Allergies  Allergen Reactions    Demerol ANAPHYLAXIS   [2]   No current facility-administered medications on file prior to encounter.     Current Outpatient Medications on File Prior to Encounter   Medication Sig Dispense Refill    metFORMIN 500 MG Oral Tab Take 2 tablets (1,000 mg total) by mouth 2 (two) times daily with meals.      acetaminophen 325 MG Oral Tab Take 2 tablets (650 mg total) by mouth daily as needed for Pain.      mirtazapine 45 MG Oral  Tab Take 1 tablet (45 mg total) by mouth nightly.      metoprolol succinate ER 50 MG Oral Tablet 24 Hr Take 1 tablet (50 mg total) by mouth daily.      mupirocin 2 % External Ointment Apply 1 Application topically daily. (Patient taking differently: Apply 1 Application topically as needed (apply forehead).) 30 g 2    Insulin Glargine, 1 Unit Dial, (TOUJEO SOLOSTAR) 300 UNIT/ML Subcutaneous Solution Pen-injector Inject 20 Units into the skin every evening. (Patient taking differently: Inject 26 Units into the skin every evening. Pt only takes 10 units if not eating)      traMADol 50 MG Oral Tab Take 1 tablet (50 mg total) by mouth daily as needed for Pain.      Levothyroxine Sodium 125 MCG Oral Tab Take 1 tablet (125 mcg total) by mouth every morning.      hydrochlorothiazide 12.5 MG Oral Cap Take 1 capsule (12.5 mg total) by mouth daily.      Atorvastatin Calcium (LIPITOR) 40 MG Oral Tab Take 1 tablet (40 mg total) by mouth nightly.

## 2025-01-08 NOTE — ED PROVIDER NOTES
Patient Seen in: [facility] Emergency Department      History     Chief Complaint   Patient presents with    Fatigue     Per ems, patient called them after she was feeling weak. EMS states they noted crackles in lungs, low o2 and patient states she has had a cough.     Stated Complaint:     Subjective:   HPI      79-year-old female comes to the hospital complaint of having difficulty with feeling fatigued as well as having a cough over the past 5 days.  It is reported that her pulse ox was 89% upon arrival and she was placed on 2 L with improvement although she does not feel short of breath.  She has any headaches.  She is no neck pain or stiffness.  She denies any pain in her chest.  She has no abdominal pains.  She is denying any nausea, vomiting or diarrhea.  She has no dysuria.  She is denying any other complaints.  When asked why she called EMS she says that her  and she is not completely clear why.  The patient is a relatively poor historian.    Objective:     Past Medical History:    Actinic keratosis    Basal cell carcinoma    Breast cancer (HCC)    Invasive Ductal Carcinoma    CAD (coronary artery disease)    Diabetes (HCC)    Essential hypertension    Hyperlipidemia    Hypothyroidism    Muscle weakness    Osteoarthritis    Personal history of antineoplastic chemotherapy    Rheumatoid arthritis involving multiple sites with positive rheumatoid factor (HCC)    Squamous cell carcinoma    Type 2 diabetes mellitus (HCC)              Past Surgical History:   Procedure Laterality Date    Cabg      Colonoscopy  6/2010    diverticulosis    Implant left  Nov. 2011    Reconstructive for mastectomy    Mastectomy left Left 9/21/10    Invasive Ductal CA    Other surgical history      basal cell removal    Removal of tunneled cva device, with subq port or pump, central or peripheral insertion  2/16/2012    Procedure: PORT-A-CATH REMOVAL;  Surgeon: Ozzy Ortiz MD;  Location: [facility]     Tonsillectomy                  Social History     Socioeconomic History    Marital status:    Tobacco Use    Smoking status: Never    Smokeless tobacco: Never   Vaping Use    Vaping status: Never Used   Substance and Sexual Activity    Alcohol use: No    Drug use: No     Social Drivers of Health     Financial Resource Strain: Low Risk  (7/9/2024)    Received from [facility]    Overall Financial Resource Strain (CARDIA)     Difficulty of Paying Living Expenses: Not very hard   Food Insecurity: No Food Insecurity (7/9/2024)    Received from [facility]    Hunger Vital Sign     Worried About Running Out of Food in the Last Year: Never true     Ran Out of Food in the Last Year: Never true   Transportation Needs: No Transportation Needs (7/9/2024)    Received from [facility]    PRAPARE - Transportation     Lack of Transportation (Medical): No     Lack of Transportation (Non-Medical): No   Physical Activity: Inactive (7/9/2024)    Received from [facility]    Exercise Vital Sign     Days of Exercise per Week: 0 days     Minutes of Exercise per Session: 0 min   Stress: Stress Concern Present (7/9/2024)    Received from [facility]    [place] [employer] - Occupational Stress Questionnaire     Feeling of Stress : Very much   Social Connections: Socially Isolated (7/9/2024)    Received from [facility]    Social Connection and Isolation Panel [NHANES]     Frequency of Communication with Friends and Family: Twice a week     Frequency of Social Gatherings with Friends and Family: Never     Attends Judaism Services: Never     Active Member of Clubs or Organizations: No     Attends Club or Organization Meetings: Never     Marital Status:    Housing Stability: Unknown (7/9/2024)    Received from [facility]    Housing Stability Vital Sign     Unable to Pay for Housing in the Last Year: No                  Physical Exam     ED  Triage Vitals [01/08/25 0523]   /76   Pulse 108   Resp 21   Temp 99.1 °F (37.3 °C)   Temp src Oral   SpO2 (!) 89 %   O2 Device None (Room air)       Current Vitals:   Vital Signs  BP: 137/77  Pulse: 97  Resp: 16  Temp: 99.1 °F (37.3 °C)  Temp src: Oral  MAP (mmHg): 97    Oxygen Therapy  SpO2: 100 %  O2 Device: Nasal cannula  O2 Flow Rate (L/min): 2 L/min        Physical Exam  HEENT : NCAT, EOMI, PEERL,  neck supple, no JVD, trachea midline, No LAD  Heart: S1S2 normal.  Systolic ejection murmur is noted, regular rate and rhythm  Lungs: Bibasilar crackles are noted  Abdomen: Soft nontender nondistended normal active bowel sounds without rebound, guarding or masses noted  Back nontender without CVA tenderness  Extremity no clubbing, cyanosis or edema noted.  Full range of motion noted without tenderness  Neuro: No focal deficits noted    All measures to prevent infection transmission during my interaction with the patient were taken.  The patient was already wearing droplet mask on my arrival to the room.  Personal protective equipment including a droplet mask as well as gloves were worn throughout the duration of my exam.  Hand washing was performed prior to and after the exam.  Stethoscope and equipment used during my examination was cleaned with a super Sani cloth germicidal wipe following the exam.    ED Course     Labs Reviewed   COMP METABOLIC PANEL (14) - Abnormal; Notable for the following components:       Result Value    Glucose 335 (*)     Sodium 132 (*)     Chloride 97 (*)     ALT <7 (*)     Globulin  4.2 (*)     All other components within normal limits   CBC WITH DIFFERENTIAL WITH PLATELET - Abnormal; Notable for the following components:    WBC 17.3 (*)     HGB 11.7 (*)     .0 (*)     Neutrophil Absolute Prelim 14.38 (*)     Neutrophil Absolute 14.38 (*)     Monocyte Absolute 1.15 (*)     All other components within normal limits   PRO BETA NATRIURETIC PEPTIDE - Abnormal; Notable for the  following components:    Pro-Beta Natriuretic Peptide 1,774 (*)     All other components within normal limits   TROPONIN I HIGH SENSITIVITY - Normal   LACTIC ACID, PLASMA - Normal   SARS-COV-2/FLU A AND B/RSV BY PCR (GENEXPERT) - Normal    Narrative:     This test is intended for the qualitative detection and differentiation of SARS-CoV-2, influenza A, influenza B, and respiratory syncytial virus (RSV) viral RNA in nasopharyngeal or nares swabs from individuals suspected of respiratory viral infection consistent with COVID-19 by their healthcare provider. Signs and symptoms of respiratory viral infection due to SARS-CoV-2, influenza, and RSV can be similar.    Test performed using the Xpert Xpress SARS-CoV-2/FLU/RSV (real time RT-PCR)  assay on the [employer] instrument, [employer], [place] [address].   This test is being used under the Food and Drug Administration's Emergency Use Authorization.    The authorized Fact Sheet for Healthcare Providers for this assay is available upon request from the laboratory.   URINALYSIS WITH CULTURE REFLEX   RAINBOW DRAW LIGHT GREEN   RAINBOW DRAW BLUE   RAINBOW DRAW LAVENDER   BLOOD CULTURE   BLOOD CULTURE     EKG    Rate, intervals and axes as noted on EKG Report.  Rate: 103  Rhythm: Sinus Rhythm  Reading: , , patient has sinus rhythm without acute infarction           ED Course as of 01/08/25 0717  ------------------------------------------------------------  Time: 01/08 0711  Comment: The patient's white count was 17.3 thousand per the patient's COVID, influenza and RSV were negative.  The BNP was 1007 or 74.  The patient's sodium was 132 with a glucose of 335.  The patient troponin was 24 with a lactic acid level is 1.8.  Patient had blood cultures x 2 sent and was given IV fluid as well as Rocephin and azithromycin for pneumonia at this time will be admitted for further management.  She did not arrive here pulse ox about 89% with 2 L she is at 100%.       XR CHEST  AP PORTABLE  (CPT=71045)    Result Date: 1/8/2025  PROCEDURE:  XR CHEST AP PORTABLE  (CPT=71045)  TECHNIQUE:  AP chest radiograph was obtained.  COMPARISON:  [facility] , XR, XR CHEST AP PORTABLE  (CPT=71045), 5/31/2024, 11:21 AM.  INDICATIONS:  cough  PATIENT STATED HISTORY: (As transcribed by Technologist)  Patient states cough x3 days.    FINDINGS:  There is a new large consolidative infiltrate within the right upper lobe.  There is a small right pleural effusion blunting the right costophrenic angle.  Sternotomy wires are present.  There are surgical clips seen.  Heart size is within the  limits of normal.  Degenerative changes are seen in the spine and shoulders.            CONCLUSION:  New large right upper lobe consolidative infiltrate consistent with pneumonia.  This consolidative infiltrate should be followed to complete resolution to exclude any underlying lung neoplasms.   LOCATION:  [facility]      Dictated by (CST): Delta Benjamin MD on 1/08/2025 at 6:18 AM     Finalized by (CST): Delta Benjamin MD on 1/08/2025 at 6:19 AM        Medications   sodium chloride 0.9 % IV bolus 500 mL (500 mL Intravenous New Bag 1/8/25 0610)     Followed by   sodium chloride 0.9% infusion (has no administration in time range)   cefTRIAXone (Rocephin) 1 g in sodium chloride 0.9% 100 mL IVPB-ADDV (1 g Intravenous New Bag 1/8/25 0649)   azithromycin (Zithromax) 500 mg in sodium chloride 0.9% 250mL IVPB premix (has no administration in time range)            MDM      Differential diagnosis included COVID, influenza, RSV, sepsis but not limited such.  Patient's workup here is consistent with a right upper lobe pneumonia.  No sepsis noted at this time.  He patient was hypoxic with improvement with 2 L of O2 and at this time the patient be admitted on IV antibiotics further therapy    Critical Care Note:  The patient arrived with a condition with significant morbidity and mortality associated. The services I provided  were to  promote improvement and reduce mortality specifically involving complex record review, complex medical decisions and interventions, and consultations outside the regular procedures and care normally rendered for 45 minutes of critical care time    This note was prepared using Dragon Medical voice recognition dictation software.  As a result errors may occur.  When identified to these areas have been corrected.  While every attempt is made to correct errors during dictation discrepancies may still exist.  Please contact if there are any errors.    Admission disposition: 1/8/2025  7:17 AM           Medical Decision Making      Disposition and Plan     Clinical Impression:  1. Community acquired pneumonia of right upper lobe of lung    2. Hypoxia         Disposition:  Admit  1/8/2025  7:17 am    Follow-up:  No follow-up provider specified.        Medications Prescribed:  Current Discharge Medication List              Supplementary Documentation:         Hospital Problems       Present on Admission  Date Reviewed: 5/31/2024            ICD-10-CM Noted POA    * (Principal) Community acquired pneumonia of right upper lobe of lung J18.9 1/8/2025 Unknown

## 2025-01-08 NOTE — ED INITIAL ASSESSMENT (HPI)
Patient called ems for generalized body aches and feeling weak. EMS noted patient to have low o2 sat on arrival. Patient has no complains of shortness of breath.

## 2025-01-09 ENCOUNTER — APPOINTMENT (OUTPATIENT)
Dept: CV DIAGNOSTICS | Facility: HOSPITAL | Age: 80
End: 2025-01-09
Attending: HOSPITALIST
Payer: MEDICARE

## 2025-01-09 LAB
ATRIAL RATE: 103 BPM
GLUCOSE BLD-MCNC: 151 MG/DL (ref 70–99)
GLUCOSE BLD-MCNC: 179 MG/DL (ref 70–99)
GLUCOSE BLD-MCNC: 180 MG/DL (ref 70–99)
GLUCOSE BLD-MCNC: 208 MG/DL (ref 70–99)
GLUCOSE BLD-MCNC: 241 MG/DL (ref 70–99)
P AXIS: -39 DEGREES
P-R INTERVAL: 140 MS
POTASSIUM SERPL-SCNC: 3.3 MMOL/L (ref 3.5–5.1)
POTASSIUM SERPL-SCNC: 4.1 MMOL/L (ref 3.5–5.1)
Q-T INTERVAL: 370 MS
QRS DURATION: 102 MS
QTC CALCULATION (BEZET): 484 MS
R AXIS: -60 DEGREES
T AXIS: 83 DEGREES
VENTRICULAR RATE: 103 BPM

## 2025-01-09 PROCEDURE — 82962 GLUCOSE BLOOD TEST: CPT

## 2025-01-09 PROCEDURE — 84132 ASSAY OF SERUM POTASSIUM: CPT | Performed by: HOSPITALIST

## 2025-01-09 PROCEDURE — 97530 THERAPEUTIC ACTIVITIES: CPT

## 2025-01-09 PROCEDURE — 93306 TTE W/DOPPLER COMPLETE: CPT | Performed by: HOSPITALIST

## 2025-01-09 PROCEDURE — 97161 PT EVAL LOW COMPLEX 20 MIN: CPT

## 2025-01-09 RX ORDER — AZITHROMYCIN 250 MG/1
TABLET, FILM COATED ORAL
Status: DISPENSED
Start: 2025-01-09 | End: 2025-01-09

## 2025-01-09 RX ORDER — AZITHROMYCIN 250 MG/1
500 TABLET, FILM COATED ORAL
Status: COMPLETED | OUTPATIENT
Start: 2025-01-09 | End: 2025-01-10

## 2025-01-09 RX ORDER — POTASSIUM CHLORIDE 1500 MG/1
40 TABLET, EXTENDED RELEASE ORAL EVERY 4 HOURS
Status: COMPLETED | OUTPATIENT
Start: 2025-01-09 | End: 2025-01-09

## 2025-01-09 NOTE — HOME CARE LIAISON
Received referral via [employer] for Home Health services.  CM Spoke w/ patient at the bedside and provided with list of HHC providers from [employer], choice is [facility] . Left message for spouse Mert to discuss services. Will remain available for any and all home health care needs

## 2025-01-09 NOTE — CM/SW NOTE
Received request from bedside SW to initiate referrals for [facility].  Referral sent to [facility] facilities via [employer].  PASRR completed and added to referral.  Message sent to [facility] for review.  Will need to add therapy notes to referral when available.  / to remain available for support and/or discharge planning.     Luh Pate, [facility]  Discharge Planner  [phone]

## 2025-01-09 NOTE — PROGRESS NOTES
[facility] Hospitalist Progress Note    Subjective:  No reported overnight events. Patient laying comfortably on RA. No new complaints. Denies cough.     Review of Systems  Comprehensive ROS reviewed and negative except for what is stated in HPI.      OBJECTIVE:  /74 (BP Location: Left arm)   Pulse 82   Temp 98.6 °F (37 °C) (Oral)   Resp 17   Ht 5' 3\" (1.6 m)   Wt 120 lb (54.4 kg)   SpO2 98%   BMI 21.26 kg/m²   General: No apparent distress.  Alert and oriented.  HEENT:  EOMI, PERRLA.   Pulm: Diminished breath sounds.   CV: Regular rate and rhythm, no murmur.   Abd: Soft, nontender, nondistended.   MSK: Full range of motion in extremities, no obvious deformities.    Skin: No lesions or rashes.  Neuro: No obvious focal deficits.    LABS:   Lab Results   Component Value Date    K 3.3 01/09/2025    PGLU 151 01/09/2025       All lab work and imaging personally reviewed.    Assessment/ Plan:     #Lobar pneumonia, RUL  #Leukocytosis   #Acute hypoxia  #Generalized weakness  -CXR reviewed with large RUL infiltrate  -Remains on RA comfortably, desatting at night. Monitor . SpO2>92%  -Cont current Abx. Follow urinary antigens.  -Nebs, supportive care PRN    #Elevated proBNP  -Follow up echo, stop IV fluids, monitor LE edema for now.     #HTN  #HLD  #Type II DM  #Hypothyroidism  -Cont home meds as tolerating, SSI coverage     Dispo: inpatient, ,monitor resp status, PT margauxal, [facility] vs [facility]    DO Susy Shelby [facility] Hospitalist

## 2025-01-09 NOTE — PAYOR COMM NOTE
--------------  ADMISSION REVIEW     Payor: [employer]  Subscriber #:  [id number]  Authorization Number: [id number]    Admit date: 1/8/25  Admit time: 11:36 AM       REVIEW DOCUMENTATION:     ED Provider Notes        ED Provider Notes signed by David Cano MD at 1/8/2025  7:17 AM       Author: David Cano MD Service: -- Author Type: Physician    Filed: 1/8/2025  7:17 AM Date of Service: 1/8/2025  5:37 AM Status: Addendum    : David Cano MD (Physician)    Related Notes: Original Note by David Cano MD (Physician) filed at 1/8/2025  7:13 AM           Patient Seen in: [facility] Emergency Department      History     Chief Complaint   Patient presents with    Fatigue     Per ems, patient called them after she was feeling weak. EMS states they noted crackles in lungs, low o2 and patient states she has had a cough.     Stated Complaint:     Subjective:   HPI      79-year-old female comes to the hospital complaint of having difficulty with feeling fatigued as well as having a cough over the past 5 days.  It is reported that her pulse ox was 89% upon arrival and she was placed on 2 L with improvement although she does not feel short of breath.  She has any headaches.  She is no neck pain or stiffness.  She denies any pain in her chest.  She has no abdominal pains.  She is denying any nausea, vomiting or diarrhea.  She has no dysuria.  She is denying any other complaints.  When asked why she called EMS she says that her  and she is not completely clear why.  The patient is a relatively poor historian.    Objective:     Past Medical History:    Actinic keratosis    Basal cell carcinoma    Breast cancer (HCC)    Invasive Ductal Carcinoma    CAD (coronary artery disease)    Diabetes (HCC)    Essential hypertension    Hyperlipidemia    Hypothyroidism    Muscle weakness    Osteoarthritis    Personal history of antineoplastic chemotherapy    Rheumatoid arthritis involving  multiple sites with positive rheumatoid factor (HCC)    Squamous cell carcinoma    Type 2 diabetes mellitus (HCC)              Past Surgical History:   Procedure Laterality Date    Cabg      Colonoscopy  6/2010    diverticulosis    Implant left  Nov. 2011    Reconstructive for mastectomy    Mastectomy left Left 9/21/10    Invasive Ductal CA    Other surgical history      basal cell removal    Removal of tunneled cva device, with subq port or pump, central or peripheral insertion  2/16/2012    Procedure: PORT-A-CATH REMOVAL;  Surgeon: Ozzy Ortiz MD;  [facility] [facility] [place] [place] [facility], [facility]    Tonsillectomy                  Social History     Socioeconomic History    Marital status:    Tobacco Use    Smoking status: Never    Smokeless tobacco: Never   Vaping Use    Vaping status: Never Used   Substance and Sexual Activity    Alcohol use: No    Drug use: No     Social Drivers of Health     Financial Resource Strain: Low Risk  (7/9/2024)    Received from [facility]    Overall Financial Resource Strain (CARDIA)     Difficulty of Paying Living Expenses: Not very hard   Food Insecurity: No Food Insecurity (7/9/2024)    Received from [facility]    Hunger Vital Sign     Worried About Running Out of Food in the Last Year: Never true     Ran Out of Food in the Last Year: Never true   Transportation Needs: No Transportation Needs (7/9/2024)    Received from [facility]    PRAPARE - Transportation     Lack of Transportation (Medical): No     Lack of Transportation (Non-Medical): No   Physical Activity: Inactive (7/9/2024)    Received from [facility]    Exercise Vital Sign     Days of Exercise per Week: 0 days     Minutes of Exercise per Session: 0 min   Stress: Stress Concern Present (7/9/2024)    Received from [facility]    [employer] [place] [employer] - Occupational Stress Questionnaire     Feeling of Stress : Very much   Social Connections: Socially Isolated  (7/9/2024)    Received from [facility]    Social Connection and Isolation Panel [NHANES]     Frequency of Communication with Friends and Family: Twice a week     Frequency of Social Gatherings with Friends and Family: Never     Attends [employer] Services: Never     Active Member of Clubs or Organizations: No     Attends Club or Organization Meetings: Never     Marital Status:    Housing Stability: Unknown (7/9/2024)    Received from [facility]    Housing Stability Vital Sign     Unable to Pay for Housing in the Last Year: No                  Physical Exam     ED Triage Vitals [01/08/25 0523]   /76   Pulse 108   Resp 21   Temp 99.1 °F (37.3 °C)   Temp src Oral   SpO2 (!) 89 %   O2 Device None (Room air)       Current Vitals:   Vital Signs  BP: 137/77  Pulse: 97  Resp: 16  Temp: 99.1 °F (37.3 °C)  Temp src: Oral  MAP (mmHg): 97    Oxygen Therapy  SpO2: 100 %  O2 Device: Nasal cannula  O2 Flow Rate (L/min): 2 L/min        Physical Exam  HEENT : NCAT, EOMI, PEERL,  neck supple, no JVD, trachea midline, No LAD  Heart: S1S2 normal.  Systolic ejection murmur is noted, regular rate and rhythm  Lungs: Bibasilar crackles are noted  Abdomen: Soft nontender nondistended normal active bowel sounds without rebound, guarding or masses noted  Back nontender without CVA tenderness  Extremity no clubbing, cyanosis or edema noted.  Full range of motion noted without tenderness  Neuro: No focal deficits noted    All measures to prevent infection transmission during my interaction with the patient were taken.  The patient was already wearing droplet mask on my arrival to the room.  Personal protective equipment including a droplet mask as well as gloves were worn throughout the duration of my exam.  Hand washing was performed prior to and after the exam.  Stethoscope and equipment used during my examination was cleaned with a super Sani cloth germicidal wipe following the exam.    ED Course     Labs  Reviewed   COMP METABOLIC PANEL (14) - Abnormal; Notable for the following components:       Result Value    Glucose 335 (*)     Sodium 132 (*)     Chloride 97 (*)     ALT <7 (*)     Globulin  4.2 (*)     All other components within normal limits   CBC WITH DIFFERENTIAL WITH PLATELET - Abnormal; Notable for the following components:    WBC 17.3 (*)     HGB 11.7 (*)     .0 (*)     Neutrophil Absolute Prelim 14.38 (*)     Neutrophil Absolute 14.38 (*)     Monocyte Absolute 1.15 (*)     All other components within normal limits   PRO BETA NATRIURETIC PEPTIDE - Abnormal; Notable for the following components:    Pro-Beta Natriuretic Peptide 1,774 (*)     All other components within normal limits   TROPONIN I HIGH SENSITIVITY - Normal   LACTIC ACID, PLASMA - Normal   SARS-COV-2/FLU A AND B/RSV BY PCR (GENEXPERT) - Normal    Narrative:     This test is intended for the qualitative detection and differentiation of SARS-CoV-2, influenza A, influenza B, and respiratory syncytial virus (RSV) viral RNA in nasopharyngeal or nares swabs from individuals suspected of respiratory viral infection consistent with COVID-19 by their healthcare provider. Signs and symptoms of respiratory viral infection due to SARS-CoV-2, influenza, and RSV can be similar.    Test performed using the Xpert Xpress SARS-CoV-2/FLU/RSV (real time RT-PCR)  assay on the eOriginalpert instrument, [employer], [place] [address].   This test is being used under the Food and Drug Administration's Emergency Use Authorization.    The authorized Fact Sheet for Healthcare Providers for this assay is available upon request from the laboratory.   URINALYSIS WITH CULTURE REFLEX   RAINBOW DRAW LIGHT GREEN   RAINBOW DRAW BLUE   RAINBOW DRAW LAVENDER   BLOOD CULTURE   BLOOD CULTURE     EKG    Rate, intervals and axes as noted on EKG Report.  Rate: 103  Rhythm: Sinus Rhythm  Reading: , , patient has sinus rhythm without acute infarction           ED Course as  of 01/08/25 0717  ------------------------------------------------------------  Time: 01/08 0711  Comment: The patient's white count was 17.3 thousand per the patient's COVID, influenza and RSV were negative.  The BNP was 1007 or 74.  The patient's sodium was 132 with a glucose of 335.  The patient troponin was 24 with a lactic acid level is 1.8.  Patient had blood cultures x 2 sent and was given IV fluid as well as Rocephin and azithromycin for pneumonia at this time will be admitted for further management.  She did not arrive here pulse ox about 89% with 2 L she is at 100%.       XR CHEST AP PORTABLE  (CPT=71045)    Result Date: 1/8/2025  PROCEDURE:  XR CHEST AP PORTABLE  (CPT=71045)  TECHNIQUE:  AP chest radiograph was obtained.  COMPARISON:  [facility] , XR, XR CHEST AP PORTABLE  (CPT=71045), 5/31/2024, 11:21 AM.  INDICATIONS:  cough  PATIENT STATED HISTORY: (As transcribed by Technologist)  Patient states cough x3 days.    FINDINGS:  There is a new large consolidative infiltrate within the right upper lobe.  There is a small right pleural effusion blunting the right costophrenic angle.  Sternotomy wires are present.  There are surgical clips seen.  Heart size is within the  limits of normal.  Degenerative changes are seen in the spine and shoulders.            CONCLUSION:  New large right upper lobe consolidative infiltrate consistent with pneumonia.  This consolidative infiltrate should be followed to complete resolution to exclude any underlying lung neoplasms.   LOCATION:  [facility]      Dictated by (CST): Delta Benjamin MD on 1/08/2025 at 6:18 AM     Finalized by (CST): Delta Benjamin MD on 1/08/2025 at 6:19 AM        Medications   sodium chloride 0.9 % IV bolus 500 mL (500 mL Intravenous New Bag 1/8/25 0610)     Followed by   sodium chloride 0.9% infusion (has no administration in time range)   cefTRIAXone (Rocephin) 1 g in sodium chloride 0.9% 100 mL IVPB-ADDV (1 g Intravenous New Bag 1/8/25 0649)    azithromycin (Zithromax) 500 mg in sodium chloride 0.9% 250mL IVPB premix (has no administration in time range)           MDM      Differential diagnosis included COVID, influenza, RSV, sepsis but not limited such.  Patient's workup here is consistent with a right upper lobe pneumonia.  No sepsis noted at this time.  He patient was hypoxic with improvement with 2 L of O2 and at this time the patient be admitted on IV antibiotics further therapy    Critical Care Note:  The patient arrived with a condition with significant morbidity and mortality associated. The services I provided  were to promote improvement and reduce mortality specifically involving complex record review, complex medical decisions and interventions, and consultations outside the regular procedures and care normally rendered for 45 minutes of critical care time    This note was prepared using Dragon Medical voice recognition dictation software.  As a result errors may occur.  When identified to these areas have been corrected.  While every attempt is made to correct errors during dictation discrepancies may still exist.  Please contact if there are any errors.    Admission disposition: 1/8/2025  7:17 AM           Medical Decision Making      Disposition and Plan     Clinical Impression:  1. Community acquired pneumonia of right upper lobe of lung    2. Hypoxia         Disposition:  Admit  1/8/2025  7:17 am    Follow-up:  No follow-up provider specified.        Medications Prescribed:  Current Discharge Medication List              Supplementary Documentation:         Hospital Problems       Present on Admission  Date Reviewed: 5/31/2024            ICD-10-CM Noted POA    * (Principal) Community acquired pneumonia of right upper lobe of lung J18.9 1/8/2025 Unknown              Signed by David Cano MD on 1/8/2025  7:17 AM         MEDICATIONS ADMINISTERED IN LAST 1 DAY:  atorvastatin (Lipitor) tab 40 mg       Date Action Dose Route User     1/8/2025 2050 Given 40 mg Oral Laura Byrne RN          cefTRIAXone (Rocephin) 2 g in sodium chloride 0.9% 100 mL IVPB-ADDV       Date Action Dose Route User    1/9/2025 0604 New Bag 2 g Intravenous Maile Lockhart RN          heparin (Porcine) 5000 UNIT/ML injection 5,000 Units       Date Action Dose Route User    1/8/2025 2050 Given 5,000 Units Subcutaneous (Left Lower Abdomen) Laura Byrne RN          insulin aspart (NovoLOG) 100 Units/mL FlexPen 1-5 Units       Date Action Dose Route User    1/9/2025 0533 Given 1 Units Subcutaneous (Left Upper Abdomen) Maile Lockhart RN    1/8/2025 2055 Given 2 Units Subcutaneous (Right Upper Arm) Laura Byrne RN    1/8/2025 1814 Given 4 Units Subcutaneous (Right Upper Arm) Kirsten Abdullahi RN    1/8/2025 1247 Given 4 Units Subcutaneous (Left Lower Abdomen) Queenie Hummel RN          insulin degludec (Tresiba) 100 units/mL flextouch 13 Units       Date Action Dose Route User    1/8/2025 2055 Given 13 Units Subcutaneous (Right Upper Arm) Laura Byrne RN          levothyroxine (Synthroid) tab 125 mcg       Date Action Dose Route User    1/9/2025 0533 Given 125 mcg Oral Maile Lockhart RN    1/8/2025 1247 Given 125 mcg Oral Queenie Hummel RN          melatonin tab 3 mg       Date Action Dose Route User    1/9/2025 0018 Given 3 mg Oral Maile Lockhart RN          metoprolol succinate ER (Toprol XL) 24 hr tab 50 mg       Date Action Dose Route User    1/9/2025 0533 Given 50 mg Oral Maile Lockhart RN    1/8/2025 1248 Given 50 mg Oral Queenie Hummel RN          potassium chloride 20 mEq/100mL IVPB premix 20 mEq       Date Action Dose Route User    1/8/2025 1409 New Bag 20 mEq Intravenous Queenie Hummel RN          sodium chloride 0.9% infusion       Date Action Dose Route User    1/9/2025 0538 New Bag (none) Intravenous Maile Lockhart RN    1/8/2025 1800 Rate/Dose Verify (none) Intravenous Kirsten Abdullahi RN    1/8/2025 1248 New Bag (none) Intravenous  Queenie Hummel, RN          traMADol (Ultram) tab 50 mg       Date Action Dose Route User    2025 Given 50 mg Oral Maile Lockhart, FREDERICK          mirtazapine (Remeron) tab 45 mg       Date Action Dose Route User    2025 Given 45 mg Oral Laura Byrne RN            Vitals (last day)       Date/Time Temp Pulse Resp BP SpO2 Weight O2 Device O2 Flow Rate (L/min) [facility]    25 0500 97.6 °F (36.4 °C) -- 18 122/66 100 % -- Nasal cannula 2 L/min EC    25 0214 97.7 °F (36.5 °C) 84 16 142/73 98 % -- Nasal cannula 2 L/min EC    25 2047 98.5 °F (36.9 °C) 95 16 135/71 94 % -- Nasal cannula 2 L/min     25 1705 99 °F (37.2 °C) 93 16 128/72 98 % -- Nasal cannula 2 L/min LW    25 1145 -- -- 18 -- -- -- -- -- LW    25 1145 97.7 °F (36.5 °C) 107 -- 152/107 64 % 120 lb (54.4 kg) Nasal cannula 2 L/min SZ    2530 -- 95 15 139/74 100 % -- Nasal cannula 2 L/min PS    25 0900 -- 100 16 133/77 97 % -- Nasal cannula 2 L/min SK    25 -- 103 18 134/80 98 % -- Nasal cannula 2 L/min SK    25 08 -- 115 21 160/97 98 % -- Nasal cannula 2 L/min SK    2530 -- 105 15 141/87 98 % -- Nasal cannula 2 L/min SK    25 0721 -- -- -- -- -- -- Nasal cannula 2 L/min SK    2515 -- 104 19 161/85 97 % -- Nasal cannula 2 L/min SK    25 0645 -- 97 16 137/77 100 % -- -- -- DV    25 0530 -- 107 18 142/76 99 % -- Nasal cannula 2 L/min DV    25 0523 99.1 °F (37.3 °C) 108 21 142/76 89 % 150 lb (68 kg) None (Room air) -- DV          [facility]            Yeny Rust Patient Status:  Inpatient    12/3/1945 MRN [id number]   Location [facility] 0SW-A Attending Ranjeet Cruz MD   Hosp Day # 0 PCP Thais Echeverria MD      Chief Complaint: Generalized weakness fatigue cough        Subjective:  History of Present Illness:      Yeny Rust is a 79 year old female with multiple medical  problems including diabetes type 2 rheumatoid arthritis history of breast cancer hypothyroidism hyperlipidemia hypertension coronary artery disease who lives at home with her .  Patient underwent rehab at [facility] rehab 6 months ago without significant improvement as per .  Her  was very concerned about her as for the past couple of days she became more weak and confused and fatigued to the point that she could not get up from the bed last night.  He called paramedics who brought her in and upon being evaluated in the emergency room she was found to have right upper lobe and middle lobe pneumonia.        History/Other:  Past Medical History:  Past Medical History       Past Medical History:    Actinic keratosis    Basal cell carcinoma    Breast cancer (HCC)     Invasive Ductal Carcinoma    CAD (coronary artery disease)    Diabetes (HCC)    Essential hypertension    Hyperlipidemia    Hypothyroidism    Muscle weakness    Osteoarthritis    Personal history of antineoplastic chemotherapy    Rheumatoid arthritis involving multiple sites with positive rheumatoid factor (HCC)    Squamous cell carcinoma    Type 2 diabetes mellitus (HCC)        Past Surgical History:   Past Surgical History         Past Surgical History:   Procedure Laterality Date    Cabg        Colonoscopy   6/2010     diverticulosis    Implant left   Nov. 2011     Reconstructive for mastectomy    Mastectomy left Left 9/21/10     Invasive Ductal CA    Other surgical history         basal cell removal    Removal of tunneled cva device, with subq port or pump, central or peripheral insertion   2/16/2012     Procedure: PORT-A-CATH REMOVAL;  Surgeon: Ozzy Ortiz MD;  Location: [facility] [place] [facility], [facility]    Tonsillectomy             Family History:   Family History         Family History   Problem Relation Age of Onset    Heart Disorder Father      Heart Disease Father      Cancer Mother           breast    Breast Cancer Mother  65         mom- dx age 60's    Breast Cancer Cousin 45         P 1st  cousin dx age 45    Breast Cancer Maternal Aunt 50         dx age 50's    Stroke Neg          Social History:    reports that she has never smoked. She has never used smokeless tobacco. She reports that she does not drink alcohol and does not use drugs.      Allergies: [Allergies]    [Allergies]       Allergen Reactions    Demerol ANAPHYLAXIS        Medications:  [Medications Ordered Prior to Encounter]    [Medications Ordered Prior to Encounter]  No current facility-administered medications on file prior to encounter.             Current Outpatient Medications on File Prior to Encounter   Medication Sig Dispense Refill    metFORMIN 500 MG Oral Tab Take 2 tablets (1,000 mg total) by mouth 2 (two) times daily with meals.        acetaminophen 325 MG Oral Tab Take 2 tablets (650 mg total) by mouth daily as needed for Pain.        mirtazapine 45 MG Oral Tab Take 1 tablet (45 mg total) by mouth nightly.        metoprolol succinate ER 50 MG Oral Tablet 24 Hr Take 1 tablet (50 mg total) by mouth daily.        mupirocin 2 % External Ointment Apply 1 Application topically daily. (Patient taking differently: Apply 1 Application topically as needed (apply forehead).) 30 g 2    Insulin Glargine, 1 Unit Dial, (TOUJEO SOLOSTAR) 300 UNIT/ML Subcutaneous Solution Pen-injector Inject 20 Units into the skin every evening. (Patient taking differently: Inject 26 Units into the skin every evening. Pt only takes 10 units if not eating)        traMADol 50 MG Oral Tab Take 1 tablet (50 mg total) by mouth daily as needed for Pain.        Levothyroxine Sodium 125 MCG Oral Tab Take 1 tablet (125 mcg total) by mouth every morning.        hydrochlorothiazide 12.5 MG Oral Cap Take 1 capsule (12.5 mg total) by mouth daily.        Atorvastatin Calcium (LIPITOR) 40 MG Oral Tab Take 1 tablet (40 mg total) by mouth nightly.            Review of Systems:   A comprehensive review of  systems was completed.    Pertinent positives and negatives noted in the HPI.        Objective:  Physical Exam:    BP (!) 152/107 (BP Location: Right arm)   Pulse 107   Temp 97.7 °F (36.5 °C) (Oral)   Resp (!) 181   Ht 5' 3\" (1.6 m)   Wt 120 lb (54.4 kg)   SpO2 (!) 64%   BMI 21.26 kg/m²   General: No acute distress, Alert  Respiratory: No rhonchi, no wheezes  Cardiovascular: S1, S2. Regular rate and rhythm  Abdomen: Soft, Non-tender, non-distended, positive bowel sounds  Neuro: No new focal deficits  Extremities: No edema           Results:  Labs:        Labs Last 24 Hours:         Recent Labs   Lab 01/08/25  0608   RBC 4.24   HGB 11.7*   HCT 36.1   MCV 85.1   MCH 27.6   MCHC 32.4   RDW 13.9   NEPRELIM 14.38*   WBC 17.3*   .0*             Recent Labs   Lab 01/08/25  0546   *   BUN 9   CREATSERUM 0.80   EGFRCR 75   CA 9.6   ALB 4.0   *   K 3.8   CL 97*   CO2 26.0   ALKPHO 91   AST 15   ALT <7*   BILT 0.3   TP 8.2         Estimated Glomerular Filtration Rate: 75.1 mL/min/1.73m2 (by CKD-EPI based on SCr of 0.8 mg/dL).     No results found for: \"PT\", \"INR\"         Recent Labs   Lab 01/08/25  0546   TROPHS 24             Recent Labs   Lab 01/08/25  0546   PBNP 1,774*         No results for input(s): \"PCT\" in the last 168 hours.     Imaging: Imaging data reviewed in Epic.        Assessment & Plan:  # 79 years old female who lives at home with her  who is her primary caregiver  Presents with generalized weakness fatigue and cough-  Started on ceftriaxone and Zithromax in the emergency room for community-acquired pneumonia     # History of breast cancer status post left mastectomy     # Diabetes type 2 uncontrolled     # History of CAD and aortic stenosis     # Chronic diastolic CHF           Plan of care discussed with patient and ED physician     Deisi Bass MD        **Certification        PHYSICIAN Certification of Need for Inpatient Hospitalization - Initial Certification     Patient  will require inpatient services that will reasonably be expected to span two midnight's based on the clinical documentation in H+P.   Based on patients current state of illness, I anticipate that, after discharge, patient will require TBD.

## 2025-01-09 NOTE — PLAN OF CARE
Problem: RESPIRATORY - ADULT  Goal: Achieves optimal ventilation and oxygenation  Description: INTERVENTIONS:  - Assess for changes in respiratory status  - Assess for changes in mentation and behavior  - Position to facilitate oxygenation and minimize respiratory effort  - Oxygen supplementation based on oxygen saturation or ABGs  - Provide Smoking Cessation handout, if applicable  - Encourage broncho-pulmonary hygiene including cough, deep breathe, Incentive Spirometry  - Assess the need for suctioning and perform as needed  - Assess and instruct to report SOB or any respiratory difficulty  - Respiratory Therapy support as indicated  - Manage/alleviate anxiety  - Monitor for signs/symptoms of CO2 retention  Outcome: Progressing

## 2025-01-09 NOTE — PHYSICAL THERAPY NOTE
PHYSICAL THERAPY EVALUATION - INPATIENT     Room Number: OFD1/OFD1-A  Evaluation Date: 1/9/2025  Type of Evaluation: Initial  Physician Order: PT Eval and Treat    Presenting Problem: PNA  Co-Morbidities : diabetes type 2 rheumatoid arthritis history of breast cancer hypothyroidism hyperlipidemia hypertension coronary artery disease  Reason for Therapy: Mobility Dysfunction and Discharge Planning    PHYSICAL THERAPY ASSESSMENT   Patient is a 79 year old female admitted 1/8/2025 for PNA.  Prior to admission, patient's baseline is mod I with RW for household distances.  Patient is currently functioning below baseline with bed mobility, transfers, gait, and stair negotiation.  Patient is requiring minimal assist and moderate assist as a result of the following impairments: decreased functional strength, decreased endurance/aerobic capacity, and decreased muscular endurance.  Physical Therapy will continue to follow for duration of hospitalization.    Patient will benefit from continued skilled PT Services to promote return to prior level of function and safety with continuous assistance and gradual rehabilitative therapy .    PLAN DURING HOSPITALIZATION  Nursing Mobility Recommendation :  (1 assist RW transfer <> chair or commode)  PT Device Recommendation: Rolling walker  PT Treatment Plan: Bed mobility;Body mechanics;Coordination;Endurance;Energy conservation;Patient education;Family education;Gait training;Neuromuscular re-educate;Range of motion;Strengthening;Stoop training;Stair training;Transfer training;Balance training  Rehab Potential : Good  Frequency (Obs): 3-5x/week     CURRENT GOALS    Goal #1 Patient is able to demonstrate supine - sit EOB @ level: supervision     Goal #2 Patient is able to demonstrate transfers EOB to/from Chair/Wheelchair at assistance level: supervision     Goal #3 Patient is able to ambulate 50 feet with assist device: walker - rolling at assistance level: supervision     Goal #4     Goal #5    Goal #6    Goal Comments: Goals established on 1/9/2025      PHYSICAL THERAPY MEDICAL/SOCIAL HISTORY  History related to current admission: Patient is a 79 year old female admitted on 1/8/2025: Presented with confusion and weakness dx PNA    Recent Admits  5/31-6/3/24: acute weakness > [facility]  ED 7/2: fall     HOME SITUATION  Type of Home: House  Home Layout: Two level  Stairs to Enter : 1   Railing: No    Stairs to Bedroom: 10    Railing: Yes    Lives With: Spouse    Drives: No   Patient Regularly Uses: Wheelchair;Rolling walker      Prior Level of [place]: Was working with OPPT after dc from [facility] for gait instability , was walking short household distances with RW and navigating stairs prior to admit,  will brace RW for her to pull on for STS transfer, denies falls in the past 6 months     SUBJECTIVE  \"This is hard\"     OBJECTIVE  Precautions: Bed/chair alarm  Fall Risk: High fall risk    WEIGHT BEARING RESTRICTION     PAIN ASSESSMENT  Rating: Unable to rate  Location: BL knees  Management Techniques: Activity promotion;Body mechanics;Repositioning    COGNITION  Overall Cognitive Status:  mild confusion noted    RANGE OF MOTION AND STRENGTH ASSESSMENT  Upper extremity ROM and strength are within functional limits     Lower extremity ROM is within functional limits     Lower extremity strength is within functional limits     BALANCE  Static Sitting: Fair -  Dynamic Sitting: Fair -  Static Standing: Poor  Dynamic Standing: Poor    ADDITIONAL TESTS                                    ACTIVITY TOLERANCE           BP: 126/74  BP Location: Left arm  BP Method: Automatic  Patient Position: Sitting    O2 WALK  Oxygen Therapy  SPO2% on Room Air at Rest: 91  SPO2% Ambulation on Room Air: 94    NEUROLOGICAL FINDINGS                        AM-PAC '6-Clicks' INPATIENT SHORT FORM - BASIC MOBILITY  How much difficulty does the patient currently have...  Patient Difficulty: Turning over in bed (including  adjusting bedclothes, sheets and blankets)?: A Little   Patient Difficulty: Sitting down on and standing up from a chair with arms (e.g., wheelchair, bedside commode, etc.): A Lot   Patient Difficulty: Moving from lying on back to sitting on the side of the bed?: A Little   How much help from another person does the patient currently need...   Help from Another: Moving to and from a bed to a chair (including a wheelchair)?: A Lot   Help from Another: Need to walk in hospital room?: Total   Help from Another: Climbing 3-5 steps with a railing?: Total     AM-PAC Score:  Raw Score: 12   Approx Degree of Impairment: 68.66%   Standardized Score (AM-PAC Scale): 35.33   CMS Modifier (G-Code): CL    FUNCTIONAL ABILITY STATUS  Gait Assessment   Functional Mobility/Gait Assessment  Gait Assistance: Moderate assistance  Distance (ft): 10 (side stepping, bracing bed on back of legs for support)  Assistive Device: Rolling walker  Pattern: Shuffle    Skilled Therapy Provided     Bed Mobility:  Rolling: mod I   Supine to sit: min A    Sit to supine: NT     Transfer Mobility:  Sit to stand: mod A    Stand to sit: min A   Gait = mod A     Therapist's Comments: Discussed case with RN prior to session initiation, spouse present during session and RN present at end of session. Pt agreeable to participation in therapy. Gait belt donned for out of bed mobility. Cued for use of bed rail for supine > EOB transfer requiring inc assist from sidelying to sitting. Denied dizziness - BP noted above. Pulling on RW to stand - spouse holds at home even with therapist holding required multiple attempts and mod A. Impaired dynamic balance and impaired functional activity tolerance noted while performing lateral stepping along edge of bed - using bed for support on the back of her legs, therapist assisting with weight shifting and RW navigation.       Exercise/Education Provided:  Bed mobility  Body mechanics  Functional activity  tolerated  Strengthening  Transfer training    Patient End of Session: In bed;With  staff;Needs met;Call light within reach;RN aware of session/findings;All patient questions and concerns addressed;Hospital anti-slip socks;Family present;Discussed recommendations with /      Patient Evaluation Complexity Level:  History Low - no personal factors and/or co-morbidities   Examination of body systems Low -  addressing 1-2 elements   Clinical Presentation Low- Stable   Clinical Decision Making Low Complexity       PT Session Time: 24 minutes  Gait Training:  minutes  Therapeutic Activity: 15 minutes  Neuromuscular Re-education:  minutes  Therapeutic Exercise:  minutes

## 2025-01-09 NOTE — PLAN OF CARE
Patient alert to self, confusion, forgetful, restless at HS, 2 liters nasal cannula, weak intermittent cough, abdomen soft, bowel sounds present, purwick for voids, continent of bowels, medicated with tramadol for complaints of generalized discomfort, IVF infusing, IV site protected, patient will remove oxygen and telemetry leads, qid accu checks, medications per MAR, VSS.

## 2025-01-09 NOTE — CM/SW NOTE
01/09/25 1100   SNEHA/JESSE Referral Data   Referral Source Social Work (self-referral)   Reason for Referral Discharge planning   Informant Spouse/Significant Other   Medical Hx   Does patient have an established PCP? Yes   Patient Info   Patient's Home Environment House   Patient lives with Spouse/Significant other   Patient Status Prior to Admission   Independent with ADLs and Mobility No   Pt. requires assistance with Housework;Driving;Meals;Bathing;Medications;Finances   Services in place prior to admission DME/Supplies at home   Discharge Needs   Anticipated D/C needs Home health care   Choice of Post-Acute Provider   Informed patient of right to choose their preferred provider Yes   List of appropriate post-acute services provided to patient/family with quality data No - Declined list   Patient/family choice Residential     Anticipated therapy need: Gradual Rehabilitative Therapy. Referrals/PASRR done. CLRC pending.    JESSE spoke to pt Mert hernandez to discuss DC planning. Reports he lives at home with pt, and has hx with [facility]. Discussed gradual for DC. Declined rehab, stating they had negative experience at [place]. Declined looking into other facilities, and would rather plan for home.    Referral sent to [facility], liaison notified. JESSE provided sps phone number if he reconsiders rehab.     JESSE/SNEHA to remain available for dc planning, and/or additional need for support.    Clyde BLACKMON, LYNNETTE  Discharge Planner  [phone]

## 2025-01-09 NOTE — DISCHARGE INSTRUCTIONS
Sometimes managing your health at home requires assistance.   The [facility] team has recognized your preference to use [facility].  They can be reached by phone at [phone].  The fax number for your reference is [phone]. A representative from the home health agency will contact you or your family to schedule your first visit.

## 2025-01-10 LAB
ANION GAP SERPL CALC-SCNC: 9 MMOL/L (ref 0–18)
BUN BLD-MCNC: 6 MG/DL (ref 9–23)
CALCIUM BLD-MCNC: 9.2 MG/DL (ref 8.7–10.4)
CHLORIDE SERPL-SCNC: 98 MMOL/L (ref 98–112)
CHOLEST SERPL-MCNC: 118 MG/DL (ref ?–200)
CO2 SERPL-SCNC: 26 MMOL/L (ref 21–32)
CREAT BLD-MCNC: 0.62 MG/DL
EGFRCR SERPLBLD CKD-EPI 2021: 91 ML/MIN/1.73M2 (ref 60–?)
ERYTHROCYTE [DISTWIDTH] IN BLOOD BY AUTOMATED COUNT: 14 %
GLUCOSE BLD-MCNC: 155 MG/DL (ref 70–99)
GLUCOSE BLD-MCNC: 162 MG/DL (ref 70–99)
GLUCOSE BLD-MCNC: 163 MG/DL (ref 70–99)
GLUCOSE BLD-MCNC: 178 MG/DL (ref 70–99)
GLUCOSE BLD-MCNC: 217 MG/DL (ref 70–99)
HCT VFR BLD AUTO: 31.9 %
HDLC SERPL-MCNC: 40 MG/DL (ref 40–59)
HGB BLD-MCNC: 10.7 G/DL
LDLC SERPL CALC-MCNC: 57 MG/DL (ref ?–100)
MCH RBC QN AUTO: 28.1 PG (ref 26–34)
MCHC RBC AUTO-ENTMCNC: 33.5 G/DL (ref 31–37)
MCV RBC AUTO: 83.7 FL
NONHDLC SERPL-MCNC: 78 MG/DL (ref ?–130)
OSMOLALITY SERPL CALC.SUM OF ELEC: 277 MOSM/KG (ref 275–295)
PLATELET # BLD AUTO: 480 10(3)UL (ref 150–450)
PLATELETS.RETICULATED NFR BLD AUTO: 2 % (ref 0–7)
POTASSIUM SERPL-SCNC: 3.6 MMOL/L (ref 3.5–5.1)
POTASSIUM SERPL-SCNC: 4.4 MMOL/L (ref 3.5–5.1)
RBC # BLD AUTO: 3.81 X10(6)UL
SODIUM SERPL-SCNC: 133 MMOL/L (ref 136–145)
TRIGL SERPL-MCNC: 113 MG/DL (ref 30–149)
TROPONIN I SERPL HS-MCNC: 596 NG/L
TROPONIN I SERPL HS-MCNC: 625 NG/L
VLDLC SERPL CALC-MCNC: 17 MG/DL (ref 0–30)
WBC # BLD AUTO: 15.8 X10(3) UL (ref 4–11)

## 2025-01-10 PROCEDURE — 82962 GLUCOSE BLOOD TEST: CPT

## 2025-01-10 PROCEDURE — 94760 N-INVAS EAR/PLS OXIMETRY 1: CPT

## 2025-01-10 PROCEDURE — 80048 BASIC METABOLIC PNL TOTAL CA: CPT | Performed by: STUDENT IN AN ORGANIZED HEALTH CARE EDUCATION/TRAINING PROGRAM

## 2025-01-10 PROCEDURE — 84132 ASSAY OF SERUM POTASSIUM: CPT | Performed by: STUDENT IN AN ORGANIZED HEALTH CARE EDUCATION/TRAINING PROGRAM

## 2025-01-10 PROCEDURE — 84484 ASSAY OF TROPONIN QUANT: CPT | Performed by: STUDENT IN AN ORGANIZED HEALTH CARE EDUCATION/TRAINING PROGRAM

## 2025-01-10 PROCEDURE — 93010 ELECTROCARDIOGRAM REPORT: CPT | Performed by: INTERNAL MEDICINE

## 2025-01-10 PROCEDURE — 85027 COMPLETE CBC AUTOMATED: CPT | Performed by: STUDENT IN AN ORGANIZED HEALTH CARE EDUCATION/TRAINING PROGRAM

## 2025-01-10 PROCEDURE — 93005 ELECTROCARDIOGRAM TRACING: CPT

## 2025-01-10 PROCEDURE — 80061 LIPID PANEL: CPT | Performed by: STUDENT IN AN ORGANIZED HEALTH CARE EDUCATION/TRAINING PROGRAM

## 2025-01-10 PROCEDURE — 84484 ASSAY OF TROPONIN QUANT: CPT | Performed by: INTERNAL MEDICINE

## 2025-01-10 RX ORDER — POTASSIUM CHLORIDE 1500 MG/1
40 TABLET, EXTENDED RELEASE ORAL EVERY 4 HOURS
Status: COMPLETED | OUTPATIENT
Start: 2025-01-10 | End: 2025-01-10

## 2025-01-10 NOTE — PAYOR COMM NOTE
--------------  CONTINUED STAY REVIEW    Payor: [employer]  Subscriber #:  [id number]  Authorization Number: [id number]    Admit date: 1/8/25  Admit time: 11:36 AM     REVIEW DOCUMENTATION: 1/9-1/10    1/9  [facility] Hospitalist Progress Note     Subjective:  No reported overnight events. Patient laying comfortably on RA. No new complaints. Denies cough.      Review of Systems  Comprehensive ROS reviewed and negative except for what is stated in HPI.       OBJECTIVE:  /74 (BP Location: Left arm)   Pulse 82   Temp 98.6 °F (37 °C) (Oral)   Resp 17   Ht 5' 3\" (1.6 m)   Wt 120 lb (54.4 kg)   SpO2 98%   BMI 21.26 kg/m²   General: No apparent distress.  Alert and oriented.  HEENT:  EOMI, PERRLA.   Pulm: Diminished breath sounds.   CV: Regular rate and rhythm, no murmur.   Abd: Soft, nontender, nondistended.   MSK: Full range of motion in extremities, no obvious deformities.    Skin: No lesions or rashes.  Neuro: No obvious focal deficits.     LABS:         Lab Results   Component Value Date     K 3.3 01/09/2025     PGLU 151 01/09/2025         All lab work and imaging personally reviewed.     Assessment/ Plan:      #Lobar pneumonia, RUL  #Leukocytosis   #Acute hypoxia  #Generalized weakness  -CXR reviewed with large RUL infiltrate  -Remains on RA comfortably, desatting at night. Monitor . SpO2>92%  -Cont current Abx. Follow urinary antigens.  -Nebs, supportive care PRN     #Elevated proBNP  -Follow up echo, stop IV fluids, monitor LE edema for now.      #HTN  #HLD  #Type II DM  #Hypothyroidism  -Cont home meds as tolerating, SSI coverage      Dispo: inpatient, ,monitor resp status, PT eval, [facility] vs [facility]     Bill Dowling DO  [facility] Hospitalist      1/10/2025 Hospitalist Progress Note   Bill Dowling DO   Physician  Hospitalist     Progress Notes     Signed     Date of Service: 1/10/2025  8:39 AM     Signed         [facility] Hospitalist Progress Note     Subjective:  No reported overnight  events.  Patient reported vague chest pain this morning.  1 loose bowel movement noted.     Review of Systems  Comprehensive ROS reviewed and negative except for what is stated in HPI.       OBJECTIVE:  /72 (BP Location: Right arm)   Pulse 89   Temp 97.9 °F (36.6 °C) (Oral)   Resp 18   Ht 5' 3\" (1.6 m)   Wt 120 lb (54.4 kg)   SpO2 95%   BMI 21.26 kg/m²   General: No apparent distress.  Alert and oriented.  HEENT:  EOMI, PERRLA.   Pulm: Diminished breath sounds.   CV: Regular rate and rhythm, no murmur.   Abd: Soft, nontender, nondistended.   MSK: Full range of motion in extremities, no obvious deformities.    Skin: No lesions or rashes.  Neuro: No obvious focal deficits.     LABS:         Lab Results   Component Value Date     WBC 15.8 01/10/2025     HGB 10.7 01/10/2025     HCT 31.9 01/10/2025     .0 01/10/2025     CREATSERUM 0.62 01/10/2025     BUN 6 01/10/2025      01/10/2025     K 3.6 01/10/2025     CL 98 01/10/2025     CO2 26.0 01/10/2025      01/10/2025     CA 9.2 01/10/2025     PGLU 163 01/10/2025         All lab work and imaging personally reviewed.     Assessment/ Plan:      #Lobar pneumonia, RUL  #Leukocytosis   #Acute hypoxia  #Generalized weakness  -CXR reviewed with large RUL infiltrate  -Remains on RA comfortably, desatting at night. Monitor . SpO2>92%  -Cont current Abx.  -Nebs, supportive care PRN     #Chest pain  #Elevated proBNP  -Follow-up echo.  Obtain EKG and troponin.  Cardiology consulted.     #Diarrhea  -1 episode noted so far, will monitor and if continues concern for C. difficile.     #HTN  #HLD  #Type II DM  #Hypothyroidism  -Cont home meds as tolerating, SSI coverage      Dispo: inpatient     BillDO [facility] Hospitalist                    MEDICATIONS ADMINISTERED IN LAST 1 DAY:  atorvastatin (Lipitor) tab 40 mg       Date Action Dose Route User    1/9/2025 1937 Given 40 mg Oral Tran Barrera, RN          azithromycin (Zithromax)  tab 500 mg       Date Action Dose Route User    1/10/2025 0750 Given 500 mg Oral Desirae Merritt RN          cefTRIAXone (Rocephin) 2 g in sodium chloride 0.9% 100 mL IVPB-ADDV       Date Action Dose Route User    1/10/2025 0458 New Bag 2 g Intravenous Tran Barrera RN          heparin (Porcine) 5000 UNIT/ML injection 5,000 Units       Date Action Dose Route User    1/10/2025 0751 Given 5,000 Units Subcutaneous (Left Lower Abdomen) Desirae Merritt RN    1/9/2025 1937 Given 5,000 Units Subcutaneous (Left Lower Arm) Tran Barrera RN          insulin aspart (NovoLOG) 100 Units/mL FlexPen 1-5 Units       Date Action Dose Route User    1/10/2025 1256 Given 1 Units Subcutaneous (Right Upper Arm) Desirae Merritt RN    1/10/2025 0502 Given 2 Units Subcutaneous (Left Lower Abdomen) Tran Barrera RN    1/9/2025 1937 Given 3 Units Subcutaneous (Left Lower Abdomen) Tran Barrera RN    1/9/2025 1800 Given 1 Units Subcutaneous (Right Upper Arm) Aneesh Hammer RN          insulin degludec (Tresiba) 100 units/mL flextouch 13 Units       Date Action Dose Route User    1/9/2025 1937 Given 13 Units Subcutaneous (Right Upper Arm) Tran Barrera RN          levothyroxine (Synthroid) tab 125 mcg       Date Action Dose Route User    1/10/2025 0500 Given 125 mcg Oral Tran Barrera RN          metoprolol succinate ER (Toprol XL) 24 hr tab 50 mg       Date Action Dose Route User    1/10/2025 0500 Given 50 mg Oral Tran Barrera RN          Perflutren Lipid Microsphere (DEFINITY) 6.52 MG/ML injection 1.5 mL       Date Action Dose Route User    1/9/2025 1615 Given 1.5 mL Intravenous Renetta Mckeon          potassium chloride (Klor-Con M20) tab 40 mEq       Date Action Dose Route User    1/10/2025 1443 Given 40 mEq Oral Desirae Merritt RN    1/10/2025 1046 Given 40 mEq Oral Desirae Merritt RN          mirtazapine (Remeron) tab 45 mg       Date Action Dose Route User    1/9/2025 1937 Given 45 mg Oral  Tran Barrera, RN            Vitals (last day)       Date/Time Temp Pulse Resp BP SpO2 Weight O2 Device O2 Flow Rate (L/min) [facility]    01/10/25 1158 -- 89 -- -- 95 % -- Nasal cannula 1 L/min     01/10/25 1158 97.9 °F (36.6 °C) -- 18 134/72 -- -- -- --     01/10/25 0800 -- -- -- -- 96 % -- Nasal cannula 1.5 L/min     01/10/25 0756 -- 89 -- -- 88 % -- None (Room air) --     01/10/25 0750 98.4 °F (36.9 °C) 87 16 142/75 94 % -- Nasal cannula 1 L/min     01/10/25 0506 -- 96 -- -- 94 % -- -- --     01/10/25 0506 99.1 °F (37.3 °C) -- 18 143/60 -- -- Nasal cannula 1 L/min     01/09/25 2316 99 °F (37.2 °C) 96 18 144/87 96 % -- -- -- DS    01/09/25 1940 99 °F (37.2 °C) -- 18 131/71 -- -- Nasal cannula 2 L/min     01/09/25 1837 99.5 °F (37.5 °C) -- 15 -- -- -- -- -- DP    01/09/25 1559 98.9 °F (37.2 °C) 89 13 156/78 97 % -- Nasal cannula 2 L/min AT    01/09/25 1242 98.9 °F (37.2 °C) 84 14 129/79 99 % -- Nasal cannula 2 L/min AT    01/09/25 0910 -- -- -- 126/74 -- -- -- -- KM    01/09/25 0858 98.6 °F (37 °C) 82 17 135/72 98 % -- Nasal cannula 2 L/min AT    01/09/25 0500 97.6 °F (36.4 °C) -- 18 122/66 100 % -- Nasal cannula 2 L/min     01/09/25 0214 97.7 °F (36.5 °C) 84 16 142/73 98 % -- Nasal cannula 2 L/min EC          CIWA Scores (since admission)       None

## 2025-01-10 NOTE — PLAN OF CARE
Shift Note  Pt admitted with weakness and pneumonia. Pt lives at home with . Pt alert to person & place. Pt received oral antibiotics for pneumonia. Pt had external catheter placed, pt had large continent bowel movement in bedside commode. Pt denied pain, pt required oxygen 2L while sleeping.Pt desats on room air while sleeping. primary made aware. K was replaced per cardiac protocol. Pt was transferred to PMU at approximately 1830. All belongings were sent with patient. RN attempted to call  with room change, voicemail box full.

## 2025-01-10 NOTE — CONGREGATE LIVING REVIEW
[facility] Living Authorization    The [facility] Review Committee has reviewed this case and the patient IS APPROVED for discharge to a facility for Short Term Skilled once the following procedure is followed:     - The physician discharge instructions (contained within the YAW note for SNF) must inlcude the below appropriate and approved COVID instructions to the facility    For questions regarding CLRC approval process, please contact the CM assigned to the case.  For questions regarding RN discharge workflow, please contact the unit Clinical Leader.

## 2025-01-10 NOTE — HISTORICAL OFFICE NOTE
Facility Logo [facility]  [address], 4th floor, [place] [address]  [phone]      Yeny Rust  Progress Note  Demographics:  Name: Yeny Rust YOB: 1945  Age: 77, Female Medical Record No: [id number]  Visited Date/Time: 05/17/2023 09:00 AM    Chief Complaints  Follow up. Last OV 7/7/21  History of Present Illness  Follow-up visit.  Has not seen me since the summer 2021.    No specific cardiac complaints.  Does notice a bit more exertional dyspnea on stairs.    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    Lungs are clear.  She is in sinus rhythm with aortic outflow murmur.  Mild lower extremity edema.  Obvious degenerative changes in her knees left greater than right    They have 2 children.  1 is  and lives in [place] with 2 children.  The other is  and lives in [place] [facility] with 3 children  Cardiac risk factors Hypertension, Dyslipidemia and Never smoked  Past Medical History  1.Atherosclerosis of coronary artery  2.Nonrheumatic aortic valve stenosis  3.Hypertension (HTN), primary  4.Carotid artery stenosis, bilateral  5.S/P CABG x 4  6.Hyperlipidemia (unspecified)  Past Surgical History  1.H/O left mastectomy  2.S/P CABG x 4  Family History  1. Father - History of heart attack  Social History  Smoking status Never smoked  Tobacco usage - No (Non-smoker for personal reasons (finding))  Review of systems  Constitutional No history of Fevers  Eyes No history of Double vision  ENT No history of Bloody nose (epistaxis)  Gastrointestinal No history of Abdominal pain  Cardiovascular Edema  No history of Chest pain  Genitourinary No history of Hematuria  Musculoskeletal Arthritis  Respiratory No history of Hemoptysis  Neurological No history of Ataxia  Endocrine No history of Orthostatic symptoms  Psychiatric No history of Drug abuse  Hem/Lymphatic No history of Blood clots  Allergy Immunology No history of  Hives  Integumentary No history of Rashes  Physical Examination  Vitals Right Arm Sitting  / 70 mmHg, Pulse rate 85 bpm, Height in 5' 3\", BMI: 26.7, Weight in 151 lbs (or) 68 kgs and BSA : 1.76 cc/m²  General Appearance No Acute Distress  Head/Eyes/Ears/Nose/Mouth/Throat No icterus  Neck Normal carotid pulsations  Respiratory Lungs clear with normal breath sounds  Cardiovascular Regular rhythm.    Gastrointestinal Abdomen soft  Lower Extremities No edema  Skin Warm and dry  Neurologic / Psychiatric Non-focal  Allergies  No medication allergies noted.  Medications  1.Atorvastatin Calcium 40 Mg Tab Nort, TAKE ONE TABLET BY MOUTH at bedtime  2.cetirizine (ZYRTEC ALLERGY) 10 MG tablet, 10 mg.  3.glucagon (GLUCAGON EMERGENCY) 1 MG injection kit  4.Hydrochlorothiazide 12.5 Mg Cap Scie, TAKE 1 CAPSULE BY MOUTH DAILY.  5.levothyroxine (SYNTHROID, LEVOTHROID) 88 MCG tablet, TAKE ONE TABLET BY MOUTH EVERY DAY  6.metFORMIN (GLUCOPHAGE) 500 MG tablet, 500 mg 2 times daily (before meals).  7.Metoprolol Succinate Er 24hr 50 Mg Tab Nort, TAKE ONE TABLET BY MOUTH ONCE DAILY  8.predniSONE (DELTASONE) 5 MG tablet, Take 5 mg by mouth.  9.Toujeo Max U-300 SoloStar 300 unit/mL (3 mL) subcutaneous insulin pen, daily  10.vitamin D, Cholecalciferol, 1000 units capsule, TAKE AS DIRECTED.  Impression  1.Atherosclerosis of coronary artery  2.Nonrheumatic aortic valve stenosis  3.Hypertension (HTN), primary  4.Carotid artery stenosis, bilateral  5.S/P CABG x 4  Assessment & Plan  Compensated cardiac status.  Known coronary disease with prior bypass surgery.  Underlying aortic stenosis.    She is overdue for her transthoracic echocardiogram.  This should be performed this spring.    She needs a lipid profile when she comes for her study.    I will see her in 1 year.  I will adjust that if need be based on her clinical course  Labs and Diagnostics ordered  1.Echocardiography - Complete (Schedule next available)  2.Lipid Panel_Fasting  (Schedule next available)  Future appointments  1.Follow up visit - Dario Benítez (1 Year)  Nurses documentation  Refills: None  Assistive Devices: wheelchair  Upcoming sx or procedures: None  Ekg: no  Patient instructions  Schedule an echocardiogram next available  Fasting blood work next available  Follow up with Dr. Benítez in 1 year    Echocardiogram:   Your provider has ordered an echocardiogram. This is an ultrasound of your heart to further assess its function and valves. You may require an IV.  You will be required to remove your shirt AND BRA, no all in-in-ones, dresses or coveralls. This test takes approximately 45 to 60 minutes     Diagnostics Details  Trans Thoracic Echocardiogram 09/03/2021  1.The left ventricle is normal in size. . Global left ventricular systolic function is normal. The left ventricular ejection fraction is 57%. The left ventricle diastolic function is impaired (Grade I) with normal left atrial pressure. Noted mild lleft ventricular hypertrophy.    2.Moderate aortic valve stenosis is present. Aortic valve area continuity equation is 1.0 cm². The trans-aortic mean gradient is 16.0 mmHg. Moderate calcification of the aortic valve is noted.    3.The pulmonary artery systolic pressure is 16 mmHg.    Carotid Ultrasound 09/03/2021  1.The study quality is good.    2.1-39% stenosis in the mid left internal carotid artery based on Bluth Criteria.    3.1-39% stenosis in the proximal right internal carotid artery based on Bluth Criteria.    4.Antegrade left vertebral artery flow.    5.Antegrade right vertebral artery flow.    CPOE Orders carried out by: Dario Benítez MD, Rhiannon Sotelo and Nora Hardwick  Care Providers: Lenka CRAIN, Dario Benítez MD and Rhiannon Sotelo  Electronically Authenticated by  Dario Benítez MD  05/17/2023 02:59:13 PM  Disclaimer: Components of this note were documented using voice recognition system and are subject to errors not corrected at proofreading.  Contact the author of this note for any clarifications.

## 2025-01-10 NOTE — PROGRESS NOTES
[facility] Hospitalist Progress Note    Subjective:  No reported overnight events.  Patient reported vague chest pain this morning.  1 loose bowel movement noted.    Review of Systems  Comprehensive ROS reviewed and negative except for what is stated in HPI.      OBJECTIVE:  /72 (BP Location: Right arm)   Pulse 89   Temp 97.9 °F (36.6 °C) (Oral)   Resp 18   Ht 5' 3\" (1.6 m)   Wt 120 lb (54.4 kg)   SpO2 95%   BMI 21.26 kg/m²   General: No apparent distress.  Alert and oriented.  HEENT:  EOMI, PERRLA.   Pulm: Diminished breath sounds.   CV: Regular rate and rhythm, no murmur.   Abd: Soft, nontender, nondistended.   MSK: Full range of motion in extremities, no obvious deformities.    Skin: No lesions or rashes.  Neuro: No obvious focal deficits.    LABS:   Lab Results   Component Value Date    WBC 15.8 01/10/2025    HGB 10.7 01/10/2025    HCT 31.9 01/10/2025    .0 01/10/2025    CREATSERUM 0.62 01/10/2025    BUN 6 01/10/2025     01/10/2025    K 3.6 01/10/2025    CL 98 01/10/2025    CO2 26.0 01/10/2025     01/10/2025    CA 9.2 01/10/2025    PGLU 163 01/10/2025       All lab work and imaging personally reviewed.    Assessment/ Plan:     #Lobar pneumonia, RUL  #Leukocytosis   #Acute hypoxia  #Generalized weakness  -CXR reviewed with large RUL infiltrate  -Remains on RA comfortably, desatting at night. Monitor . SpO2>92%  -Cont current Abx.  -Nebs, supportive care PRN    #Chest pain  #Elevated proBNP  -Follow-up echo.  Obtain EKG and troponin.  Cardiology consulted.    #Diarrhea  -1 episode noted so far, will monitor and if continues concern for C. difficile.    #HTN  #HLD  #Type II DM  #Hypothyroidism  -Cont home meds as tolerating, SSI coverage     Dispo: inpatient    BillDO Cordelia [facility] Hospitalist

## 2025-01-10 NOTE — PLAN OF CARE
Patient AAOx3, forgetful, confused. Tele-NSR. Patient received on 1L O2, saturating WNL, unable to wean off O2. Room air at baseline. IV antibiotics. Reported moderate chest pain this morning, pt unsure of onset time, Dr. Dowling made aware. EKG and trop completed. Cardiology consulted for elevated troponin, patient seen by Dr. Christianson. Chest pain resolved. Patient incontinent x2, loose stool x1. Per Dr. Dowling send next loose stool for sample. No further loose stools able to collect during shift. Patient up with assist and walker. Safety precautions place. Patient and spouse at bedside updated on POC, questions answered as able, understanding verbalized.     Problem: Patient/Family Goals  Goal: Patient/Family Long Term Goal  Description: Patient's Long Term Goal: discharge with adequate resources    Interventions:  - see consults  -IV antibiotics  -wean O2 as tolerated  - See additional Care Plan goals for specific interventions  Outcome: Progressing  Goal: Patient/Family Short Term Goal  Description: Patient's Short Term Goal:   1/10: wean O2 as tolerated    Interventions:   -   -wean O2  -cough and deep breathe  - See additional Care Plan goals for specific interventions  Outcome: Progressing     Problem: RESPIRATORY - ADULT  Goal: Achieves optimal ventilation and oxygenation  Description: INTERVENTIONS:  - Assess for changes in respiratory status  - Assess for changes in mentation and behavior  - Position to facilitate oxygenation and minimize respiratory effort  - Oxygen supplementation based on oxygen saturation or ABGs  - Provide Smoking Cessation handout, if applicable  - Encourage broncho-pulmonary hygiene including cough, deep breathe, Incentive Spirometry  - Assess the need for suctioning and perform as needed  - Assess and instruct to report SOB or any respiratory difficulty  - Respiratory Therapy support as indicated  - Manage/alleviate anxiety  - Monitor for signs/symptoms of CO2 retention  Outcome:  Progressing     Problem: GASTROINTESTINAL - ADULT  Goal: Maintains or returns to baseline bowel function  Description: INTERVENTIONS:  - Assess bowel function  - Maintain adequate hydration with IV or PO as ordered and tolerated  - Evaluate effectiveness of GI medications  - Encourage mobilization and activity  - Obtain nutritional consult as needed  - Establish a toileting routine/schedule  - Consider collaborating with pharmacy to review patient's medication profile  Outcome: Progressing     Problem: PAIN - ADULT  Goal: Verbalizes/displays adequate comfort level or patient's stated pain goal  Description: INTERVENTIONS:  - Encourage pt to monitor pain and request assistance  - Assess pain using appropriate pain scale  - Administer analgesics based on type and severity of pain and evaluate response  - Implement non-pharmacological measures as appropriate and evaluate response  - Consider cultural and social influences on pain and pain management  - Manage/alleviate anxiety  - Utilize distraction and/or relaxation techniques  - Monitor for opioid side effects  - Notify MD/LIP if interventions unsuccessful or patient reports new pain  - Anticipate increased pain with activity and pre-medicate as appropriate  Outcome: Progressing     Problem: CARDIOVASCULAR - ADULT  Goal: Maintains optimal cardiac output and hemodynamic stability  Description: INTERVENTIONS:  - Monitor vital signs, rhythm, and trends  - Monitor for bleeding, hypotension and signs of decreased cardiac output  - Evaluate effectiveness of vasoactive medications to optimize hemodynamic stability  - Monitor arterial and/or venous puncture sites for bleeding and/or hematoma  - Assess quality of pulses, skin color and temperature  - Assess for signs of decreased coronary artery perfusion - ex. Angina  - Evaluate fluid balance, assess for edema, trend weights  Outcome: Progressing

## 2025-01-10 NOTE — CONSULTS
[facility]  Consultation    Yeny Rust Patient Status:  Inpatient    12/3/1945 MRN [id number]   Location [facility] 5NW-A Attending Bill Dowling, DO   Hosp Day # 2 PCP Thais Echeverria MD       Reason for consultation;  Elevated Trp     HPI:  This is a 79 year old female patient with PMH of  CAD s/p CABG, carotid disease, HTN, aortic stenosis who presented with several days of weakness. She reports no CP, SOB or cough. She was found to have a PNA. Cardiology were consulted for elevated Trp.    She follows with Dr. Veag in clinic, and has known aortic stenosis.    MDM / Diagnostics reviewed & interpreted:  ECG shows NSR, IVCD, probable old inferior MI  Trp 625  TTE with LVEF 50-55%, moderate-severe AS    Assessment:    PNA  Elevated Trp. Suspected T2MI  CAD s/p CABG  Moderate to severe AS  Carotid disease  HTN    Plan:  -Abx per primary for PNA  -elevated Trp, likely demand ischemia. Repeat to confirm downtrend  -continue current cardiac regimen    Discussed with patient.     Please call with questions. Thank you for your consult.    Level of care: C5      Markus Christianson MD  Interventional Cardiology  [facility]    --------------------------------------------------------------------------------------------------------------------------------  ROS 10 systems reviewed, pertinent findings above.    History:  Past Medical History:    Actinic keratosis    Basal cell carcinoma    Breast cancer (HCC)    Invasive Ductal Carcinoma    CAD (coronary artery disease)    Diabetes (HCC)    Essential hypertension    Hyperlipidemia    Hypothyroidism    Muscle weakness    Osteoarthritis    Personal history of antineoplastic chemotherapy    Rheumatoid arthritis involving multiple sites with positive rheumatoid factor (HCC)    Squamous cell carcinoma    Type 2 diabetes mellitus (HCC)     Past Surgical History:   Procedure Laterality Date    Cabg      Colonoscopy  2010    diverticulosis     Implant left  Nov. 2011    Reconstructive for mastectomy    Mastectomy left Left 9/21/10    Invasive Ductal CA    Other surgical history      basal cell removal    Removal of tunneled cva device, with subq port or pump, central or peripheral insertion  2/16/2012    Procedure: PORT-A-CATH REMOVAL;  Surgeon: Ozzy Ortiz MD;  Location: [facility]    Tonsillectomy       Family History   Problem Relation Age of Onset    Heart Disorder Father     Heart Disease Father     Cancer Mother         breast    Breast Cancer Mother 65        mom- dx age 60's    Breast Cancer Cousin 45        P 1st  cousin dx age 45    Breast Cancer Maternal Aunt 50        dx age 50's    Stroke Neg       reports that she has never smoked. She has never used smokeless tobacco. She reports that she does not drink alcohol and does not use drugs.    Objective:   Temp: 97.9 °F (36.6 °C)  Pulse: 89  Resp: 18  BP: 134/72    Intake/Output:     Intake/Output Summary (Last 24 hours) at 1/10/2025 1321  Last data filed at 1/10/2025 1300  Gross per 24 hour   Intake 700 ml   Output 1700 ml   Net -1000 ml       Physical Exam:     General: NAD. Conversant.   HEENT: Normocephalic, atraumatic.  Neck: Supple.  Cardiac: Regular. Normal S1, S2 . No murmur.  Lungs: Diminished, crackles, no wheezing.  GI: Soft, non-distended  Extremities: Rt radial pulses 2+. No edema.  Skin: Warm and dry.      Markus Christianson MD  1/10/2025  1:21 PM

## 2025-01-10 NOTE — PROGRESS NOTES
AO x2. Forgetful. 1L at rest. RA is BL. Takes off O2 at times. Tele NSR. Heparin subcutaneous. Purewick. No pain reported. Up x2 walker. Azithro. Rocephin. IV SL. Carb controlled diet. Bed alarm on. Pt resting in bed with call light in reach. No further needs.

## 2025-01-11 LAB
ANION GAP SERPL CALC-SCNC: 6 MMOL/L (ref 0–18)
ATRIAL RATE: 88 BPM
BUN BLD-MCNC: 7 MG/DL (ref 9–23)
CALCIUM BLD-MCNC: 9.2 MG/DL (ref 8.7–10.4)
CHLORIDE SERPL-SCNC: 100 MMOL/L (ref 98–112)
CO2 SERPL-SCNC: 25 MMOL/L (ref 21–32)
CREAT BLD-MCNC: 0.64 MG/DL
EGFRCR SERPLBLD CKD-EPI 2021: 90 ML/MIN/1.73M2 (ref 60–?)
ERYTHROCYTE [DISTWIDTH] IN BLOOD BY AUTOMATED COUNT: 13.8 %
GLUCOSE BLD-MCNC: 126 MG/DL (ref 70–99)
GLUCOSE BLD-MCNC: 135 MG/DL (ref 70–99)
GLUCOSE BLD-MCNC: 147 MG/DL (ref 70–99)
GLUCOSE BLD-MCNC: 158 MG/DL (ref 70–99)
GLUCOSE BLD-MCNC: 95 MG/DL (ref 70–99)
HCT VFR BLD AUTO: 32.1 %
HGB BLD-MCNC: 10.6 G/DL
MCH RBC QN AUTO: 27.8 PG (ref 26–34)
MCHC RBC AUTO-ENTMCNC: 33 G/DL (ref 31–37)
MCV RBC AUTO: 84.3 FL
OSMOLALITY SERPL CALC.SUM OF ELEC: 273 MOSM/KG (ref 275–295)
P AXIS: -24 DEGREES
P-R INTERVAL: 136 MS
PLATELET # BLD AUTO: 445 10(3)UL (ref 150–450)
POTASSIUM SERPL-SCNC: 3.9 MMOL/L (ref 3.5–5.1)
Q-T INTERVAL: 408 MS
QRS DURATION: 104 MS
QTC CALCULATION (BEZET): 493 MS
R AXIS: -54 DEGREES
RBC # BLD AUTO: 3.81 X10(6)UL
SODIUM SERPL-SCNC: 131 MMOL/L (ref 136–145)
T AXIS: 69 DEGREES
VENTRICULAR RATE: 88 BPM
WBC # BLD AUTO: 12.7 X10(3) UL (ref 4–11)

## 2025-01-11 PROCEDURE — 85027 COMPLETE CBC AUTOMATED: CPT | Performed by: STUDENT IN AN ORGANIZED HEALTH CARE EDUCATION/TRAINING PROGRAM

## 2025-01-11 PROCEDURE — 80048 BASIC METABOLIC PNL TOTAL CA: CPT | Performed by: STUDENT IN AN ORGANIZED HEALTH CARE EDUCATION/TRAINING PROGRAM

## 2025-01-11 PROCEDURE — 82962 GLUCOSE BLOOD TEST: CPT

## 2025-01-11 NOTE — PROGRESS NOTES
[facility] Hospitalist Progress Note    Subjective:  No reported overnight events.  Patient says she feels better.     Review of Systems  Comprehensive ROS reviewed and negative except for what is stated in HPI.      OBJECTIVE:  /36 (BP Location: Left leg)   Pulse 81   Temp 98.2 °F (36.8 °C) (Axillary)   Resp 16   Ht 5' 3\" (1.6 m)   Wt 120 lb (54.4 kg)   SpO2 98%   BMI 21.26 kg/m²   General: No apparent distress.  Alert and oriented.  HEENT:  EOMI, PERRLA.   Pulm: Diminished breath sounds.   CV: Regular rate and rhythm, no murmur.   Abd: Soft, nontender, nondistended.   MSK: Full range of motion in extremities, no obvious deformities.    Skin: No lesions or rashes.  Neuro: No obvious focal deficits.    LABS:   Lab Results   Component Value Date    WBC 12.7 01/11/2025    HGB 10.6 01/11/2025    HCT 32.1 01/11/2025    .0 01/11/2025    CREATSERUM 0.64 01/11/2025    BUN 7 01/11/2025     01/11/2025    K 3.9 01/11/2025     01/11/2025    CO2 25.0 01/11/2025     01/11/2025    CA 9.2 01/11/2025    PGLU 135 01/11/2025       All lab work and imaging personally reviewed.    Assessment/ Plan:     #Lobar pneumonia, RUL  #Leukocytosis   #Acute hypoxia  #Generalized weakness  -CXR reviewed with large RUL infiltrate  -Remains on RA comfortably, desatting at night. Monitor . SpO2>92%  -Cont current Abx.  -Nebs, supportive care PRN    #Chest pain  #Elevated proBNP  -Follow-up echo.  Obtain EKG and troponin.  Cardiology consulted.    #Diarrhea  -1 episode noted so far, will monitor and if continues concern for C. difficile.    #HTN  #HLD  #Type II DM  #Hypothyroidism  -Cont home meds as tolerating, SSI coverage     Dispo: inpatient    52 minutes were spent w/ patient and coordinating care.     DO [facility]  Hospitalist  Contact via [place]/[place]

## 2025-01-11 NOTE — PROGRESS NOTES
[facility]   part of [facility]     Cardiology Progress Note        Yeny Rust Patient Status:  Inpatient    12/3/1945 MRN [id number]   Location [facility] 5NW-A Attending Bill Dowling, DO   Hosp Day # 3 PCP Thais Echeverria MD         Subjective/ROS:  Woke pt from her sleep. She was a bit confused but denies complaints of cp or sob. Lying flat without any apparent distress    Objective:  /72 (BP Location: Right arm)   Pulse 87   Temp 97.4 °F (36.3 °C) (Axillary)   Resp 16   Ht 5' 3\" (1.6 m)   Wt 120 lb (54.4 kg)   SpO2 96%   BMI 21.26 kg/m²     Temp (24hrs), Av °F (36.7 °C), Min:97.4 °F (36.3 °C), Max:98.8 °F (37.1 °C)      Tele  sr    Intake/Output:    Intake/Output Summary (Last 24 hours) at 2025 1442  Last data filed at 2025 1241  Gross per 24 hour   Intake 100 ml   Output 2000 ml   Net -1900 ml       No data found.     Physical Exam:    Gen: alert, a bit dazed and confused after I woke her from sleep. NAD. Appears older than stated age  Heent: pupils equal, reactive. Mucous membranes moist. Forehead with dressing in place   Neck: no jvd  Cardiac: regular rate and rhythm, normal S1,S2 with clear murmur of aortic stenosis  Lungs: diminished bs  Abd: soft, NT/ND +bs  Ext: mild LE edema  Skin: Warm, dry      Labs:  Lab Results   Component Value Date    WBC 12.7 2025    HGB 10.6 2025    HCT 32.1 2025    .0 2025    CREATSERUM 0.64 2025    BUN 7 2025     2025    K 3.9 2025     2025    CO2 25.0 2025     2025    CA 9.2 2025       CXR: Reviewed    Medications:     atorvastatin  40 mg Oral Nightly    insulin degludec  13 Units Subcutaneous Nightly    levothyroxine  125 mcg Oral Before breakfast    metoprolol succinate ER  50 mg Oral Daily Beta Blocker    mirtazapine  45 mg Oral Nightly    heparin  5,000 Units Subcutaneous 2 times per day    insulin aspart  1-5 Units  Subcutaneous TID AC and HS    cefTRIAXone  2 g Intravenous Q24H         Assessment:    RUL pneumonia with associate hypoxia- per primary service  Elevated trop, hx cad with prior bypass-   repeat trop was down trending. 625>596  Currently w/o cp but with complaints yesterday  Echo with small area of apical dyskinesis, ef preserved   Moderate to severe aortic stenosis with pk velocity 3.69 m/sec, mean gradient of 29 mmHg, VTI area 0.6cm2  Htn, grade 1 diastolic dysfunction  Hl- statin   DM    Plan:     Continue current cv regimen for now  Treatment of pna per primary service  Will ask Dr Benítez, her primary cardiologist to see on Monday.           Na Price, [place]  [phone]

## 2025-01-11 NOTE — PLAN OF CARE
A&OX3,forgetful. Maintaining O2 on 2L. Tele, NSR. denies pain. Up with walkerX1 assist. PIV SL. . Carb controlled diet. No further needs at this time. Continue POC. Safety precaution in place.   Problem: RESPIRATORY - ADULT  Goal: Achieves optimal ventilation and oxygenation  Description: INTERVENTIONS:  - Assess for changes in respiratory status  - Assess for changes in mentation and behavior  - Position to facilitate oxygenation and minimize respiratory effort  - Oxygen supplementation based on oxygen saturation or ABGs  - Provide Smoking Cessation handout, if applicable  - Encourage broncho-pulmonary hygiene including cough, deep breathe, Incentive Spirometry  - Assess the need for suctioning and perform as needed  - Assess and instruct to report SOB or any respiratory difficulty  - Respiratory Therapy support as indicated  - Manage/alleviate anxiety  - Monitor for signs/symptoms of CO2 retention  Outcome: Progressing     Problem: Patient/Family Goals  Goal: Patient/Family Long Term Goal  Description: Patient's Long Term Goal:     Interventions:  -   - See additional Care Plan goals for specific interventions  Outcome: Progressing  Goal: Patient/Family Short Term Goal  Description: Patient's Short Term Goal:1/10 noc;safety,sleep    Interventions:   - cluster care  - See additional Care Plan goals for specific interventions  Outcome: Progressing     Problem: GASTROINTESTINAL - ADULT  Goal: Maintains or returns to baseline bowel function  Description: INTERVENTIONS:  - Assess bowel function  - Maintain adequate hydration with IV or PO as ordered and tolerated  - Evaluate effectiveness of GI medications  - Encourage mobilization and activity  - Obtain nutritional consult as needed  - Establish a toileting routine/schedule  - Consider collaborating with pharmacy to review patient's medication profile  Outcome: Progressing     Problem: PAIN - ADULT  Goal: Verbalizes/displays adequate comfort level or patient's stated  pain goal  Description: INTERVENTIONS:  - Encourage pt to monitor pain and request assistance  - Assess pain using appropriate pain scale  - Administer analgesics based on type and severity of pain and evaluate response  - Implement non-pharmacological measures as appropriate and evaluate response  - Consider cultural and social influences on pain and pain management  - Manage/alleviate anxiety  - Utilize distraction and/or relaxation techniques  - Monitor for opioid side effects  - Notify MD/LIP if interventions unsuccessful or patient reports new pain  - Anticipate increased pain with activity and pre-medicate as appropriate  Outcome: Progressing     Problem: CARDIOVASCULAR - ADULT  Goal: Maintains optimal cardiac output and hemodynamic stability  Description: INTERVENTIONS:  - Monitor vital signs, rhythm, and trends  - Monitor for bleeding, hypotension and signs of decreased cardiac output  - Evaluate effectiveness of vasoactive medications to optimize hemodynamic stability  - Monitor arterial and/or venous puncture sites for bleeding and/or hematoma  - Assess quality of pulses, skin color and temperature  - Assess for signs of decreased coronary artery perfusion - ex. Angina  - Evaluate fluid balance, assess for edema, trend weights  Outcome: Progressing

## 2025-01-11 NOTE — PLAN OF CARE
Patient AAOx3, confused at times. 1L NC- takes off, desats on room air. Tele NSR. Heparin. Purewick. No BM so far today. Bandaid to forehead, mepilex to sacrum. Up assist x1/2/walker, patient attempted to get to bathroom, unable to walk there. Carb controlled diet, QID accucheck. Left arm precautions. Patient rounded on routinely. Patient and family updated on plan of care.      Problem: RESPIRATORY - ADULT  Goal: Achieves optimal ventilation and oxygenation  Description: INTERVENTIONS:  - Assess for changes in respiratory status  - Assess for changes in mentation and behavior  - Position to facilitate oxygenation and minimize respiratory effort  - Oxygen supplementation based on oxygen saturation or ABGs  - Provide Smoking Cessation handout, if applicable  - Encourage broncho-pulmonary hygiene including cough, deep breathe, Incentive Spirometry  - Assess the need for suctioning and perform as needed  - Assess and instruct to report SOB or any respiratory difficulty  - Respiratory Therapy support as indicated  - Manage/alleviate anxiety  - Monitor for signs/symptoms of CO2 retention  Outcome: Progressing     Problem: Patient/Family Goals  Goal: Patient/Family Long Term Goal  Description: Patient's Long Term Goal: Discharge home    Interventions:  - follow plan of care, O2  - See additional Care Plan goals for specific interventions  Outcome: Progressing  Goal: Patient/Family Short Term Goal  Description: Patient's Short Term Goal:1/10 noc;safety,sleep    Interventions:   - cluster care  - See additional Care Plan goals for specific interventions  Outcome: Progressing     Problem: GASTROINTESTINAL - ADULT  Goal: Maintains or returns to baseline bowel function  Description: INTERVENTIONS:  - Assess bowel function  - Maintain adequate hydration with IV or PO as ordered and tolerated  - Evaluate effectiveness of GI medications  - Encourage mobilization and activity  - Obtain nutritional consult as needed  - Establish a  toileting routine/schedule  - Consider collaborating with pharmacy to review patient's medication profile  Outcome: Progressing     Problem: PAIN - ADULT  Goal: Verbalizes/displays adequate comfort level or patient's stated pain goal  Description: INTERVENTIONS:  - Encourage pt to monitor pain and request assistance  - Assess pain using appropriate pain scale  - Administer analgesics based on type and severity of pain and evaluate response  - Implement non-pharmacological measures as appropriate and evaluate response  - Consider cultural and social influences on pain and pain management  - Manage/alleviate anxiety  - Utilize distraction and/or relaxation techniques  - Monitor for opioid side effects  - Notify MD/LIP if interventions unsuccessful or patient reports new pain  - Anticipate increased pain with activity and pre-medicate as appropriate  Outcome: Progressing     Problem: CARDIOVASCULAR - ADULT  Goal: Maintains optimal cardiac output and hemodynamic stability  Description: INTERVENTIONS:  - Monitor vital signs, rhythm, and trends  - Monitor for bleeding, hypotension and signs of decreased cardiac output  - Evaluate effectiveness of vasoactive medications to optimize hemodynamic stability  - Monitor arterial and/or venous puncture sites for bleeding and/or hematoma  - Assess quality of pulses, skin color and temperature  - Assess for signs of decreased coronary artery perfusion - ex. Angina  - Evaluate fluid balance, assess for edema, trend weights  Outcome: Progressing

## 2025-01-12 LAB
ALBUMIN SERPL-MCNC: 3.5 G/DL (ref 3.2–4.8)
ANION GAP SERPL CALC-SCNC: 7 MMOL/L (ref 0–18)
BUN BLD-MCNC: 8 MG/DL (ref 9–23)
CALCIUM BLD-MCNC: 8.8 MG/DL (ref 8.7–10.4)
CHLORIDE SERPL-SCNC: 100 MMOL/L (ref 98–112)
CO2 SERPL-SCNC: 26 MMOL/L (ref 21–32)
CREAT BLD-MCNC: 0.64 MG/DL
EGFRCR SERPLBLD CKD-EPI 2021: 90 ML/MIN/1.73M2 (ref 60–?)
ERYTHROCYTE [DISTWIDTH] IN BLOOD BY AUTOMATED COUNT: 13.7 %
GLUCOSE BLD-MCNC: 149 MG/DL (ref 70–99)
GLUCOSE BLD-MCNC: 161 MG/DL (ref 70–99)
GLUCOSE BLD-MCNC: 163 MG/DL (ref 70–99)
GLUCOSE BLD-MCNC: 170 MG/DL (ref 70–99)
GLUCOSE BLD-MCNC: 227 MG/DL (ref 70–99)
HCT VFR BLD AUTO: 32.5 %
HGB BLD-MCNC: 10.6 G/DL
MAGNESIUM SERPL-MCNC: 1.3 MG/DL (ref 1.6–2.6)
MCH RBC QN AUTO: 27.9 PG (ref 26–34)
MCHC RBC AUTO-ENTMCNC: 32.6 G/DL (ref 31–37)
MCV RBC AUTO: 85.5 FL
OSMOLALITY SERPL CALC.SUM OF ELEC: 278 MOSM/KG (ref 275–295)
PHOSPHATE SERPL-MCNC: 3.7 MG/DL (ref 2.4–5.1)
PLATELET # BLD AUTO: 400 10(3)UL (ref 150–450)
POTASSIUM SERPL-SCNC: 3.8 MMOL/L (ref 3.5–5.1)
RBC # BLD AUTO: 3.8 X10(6)UL
SODIUM SERPL-SCNC: 133 MMOL/L (ref 136–145)
WBC # BLD AUTO: 8.8 X10(3) UL (ref 4–11)

## 2025-01-12 PROCEDURE — 80069 RENAL FUNCTION PANEL: CPT

## 2025-01-12 PROCEDURE — 82962 GLUCOSE BLOOD TEST: CPT

## 2025-01-12 PROCEDURE — 85027 COMPLETE CBC AUTOMATED: CPT

## 2025-01-12 PROCEDURE — 83735 ASSAY OF MAGNESIUM: CPT

## 2025-01-12 RX ORDER — POTASSIUM CHLORIDE 1500 MG/1
40 TABLET, EXTENDED RELEASE ORAL ONCE
Status: COMPLETED | OUTPATIENT
Start: 2025-01-12 | End: 2025-01-12

## 2025-01-12 RX ORDER — MAGNESIUM OXIDE 400 MG/1
800 TABLET ORAL ONCE
Status: COMPLETED | OUTPATIENT
Start: 2025-01-12 | End: 2025-01-12

## 2025-01-12 NOTE — PLAN OF CARE
Patient AAOx3, forgetful. Tele NSR. Weaned to room air. Heparin sub q. Purewick, voids. No Bm today. Bandaid to forehead, changed. Mepilex to sacrum. Max assist. Carb controlled diet QID accucheck. Left arm precautions. K and Mg replaced per protocol. Patient rounded on routinely. Patient updated on plan of care.      Problem: RESPIRATORY - ADULT  Goal: Achieves optimal ventilation and oxygenation  Description: INTERVENTIONS:  - Assess for changes in respiratory status  - Assess for changes in mentation and behavior  - Position to facilitate oxygenation and minimize respiratory effort  - Oxygen supplementation based on oxygen saturation or ABGs  - Provide Smoking Cessation handout, if applicable  - Encourage broncho-pulmonary hygiene including cough, deep breathe, Incentive Spirometry  - Assess the need for suctioning and perform as needed  - Assess and instruct to report SOB or any respiratory difficulty  - Respiratory Therapy support as indicated  - Manage/alleviate anxiety  - Monitor for signs/symptoms of CO2 retention  Outcome: Progressing     Problem: Patient/Family Goals  Goal: Patient/Family Long Term Goal  Description: Patient's Long Term Goal: Discharge home    Interventions:  - Follow plan of care, O2  - See additional Care Plan goals for specific interventions  Outcome: Progressing  Goal: Patient/Family Short Term Goal  Description: Patient's Short Term Goal:1/10 noc;safety,sleep  1/11 noc;safety,sleep  Interventions:   - cluster care  - See additional Care Plan goals for specific interventions  Outcome: Progressing     Problem: GASTROINTESTINAL - ADULT  Goal: Maintains or returns to baseline bowel function  Description: INTERVENTIONS:  - Assess bowel function  - Maintain adequate hydration with IV or PO as ordered and tolerated  - Evaluate effectiveness of GI medications  - Encourage mobilization and activity  - Obtain nutritional consult as needed  - Establish a toileting routine/schedule  - Consider  collaborating with pharmacy to review patient's medication profile  Outcome: Progressing     Problem: PAIN - ADULT  Goal: Verbalizes/displays adequate comfort level or patient's stated pain goal  Description: INTERVENTIONS:  - Encourage pt to monitor pain and request assistance  - Assess pain using appropriate pain scale  - Administer analgesics based on type and severity of pain and evaluate response  - Implement non-pharmacological measures as appropriate and evaluate response  - Consider cultural and social influences on pain and pain management  - Manage/alleviate anxiety  - Utilize distraction and/or relaxation techniques  - Monitor for opioid side effects  - Notify MD/LIP if interventions unsuccessful or patient reports new pain  - Anticipate increased pain with activity and pre-medicate as appropriate  Outcome: Progressing     Problem: CARDIOVASCULAR - ADULT  Goal: Maintains optimal cardiac output and hemodynamic stability  Description: INTERVENTIONS:  - Monitor vital signs, rhythm, and trends  - Monitor for bleeding, hypotension and signs of decreased cardiac output  - Evaluate effectiveness of vasoactive medications to optimize hemodynamic stability  - Monitor arterial and/or venous puncture sites for bleeding and/or hematoma  - Assess quality of pulses, skin color and temperature  - Assess for signs of decreased coronary artery perfusion - ex. Angina  - Evaluate fluid balance, assess for edema, trend weights  Outcome: Progressing      How Severe Is Your Skin Lesion?: mild Have Your Skin Lesions Been Treated?: not been treated Is This A New Presentation, Or A Follow-Up?: Skin Lesions

## 2025-01-12 NOTE — PROGRESS NOTES
[facility] Hospitalist Progress Note    Subjective:  No reported overnight events.      Review of Systems  Comprehensive ROS reviewed and negative except for what is stated in HPI.      OBJECTIVE:  BP 91/50 (BP Location: Right leg)   Pulse 79   Temp 98.4 °F (36.9 °C) (Oral)   Resp 17   Ht 5' 3\" (1.6 m)   Wt 120 lb (54.4 kg)   SpO2 92%   BMI 21.26 kg/m²   General: No apparent distress.  Alert and oriented.  HEENT:  EOMI, PERRLA.   Pulm: Diminished breath sounds.   CV: Regular rate and rhythm, no murmur.   Abd: Soft, nontender, nondistended.   MSK: Full range of motion in extremities, no obvious deformities.    Skin: No lesions or rashes.  Neuro: No obvious focal deficits.    LABS:   Lab Results   Component Value Date    WBC 8.8 01/12/2025    HGB 10.6 01/12/2025    HCT 32.5 01/12/2025    .0 01/12/2025    CREATSERUM 0.64 01/12/2025    BUN 8 01/12/2025     01/12/2025    K 3.8 01/12/2025     01/12/2025    CO2 26.0 01/12/2025     01/12/2025    CA 8.8 01/12/2025    ALB 3.5 01/12/2025    MG 1.3 01/12/2025    PHOS 3.7 01/12/2025    PGLU 149 01/12/2025       All lab work and imaging personally reviewed.    Assessment/ Plan:     #Lobar pneumonia, RUL  #Leukocytosis   #Acute hypoxia  #Generalized weakness  -CXR reviewed with large RUL infiltrate  -Remains on RA comfortably, desatting at night. Monitor . SpO2>92%  -Cont current Abx.  -Nebs, supportive care PRN    #Chest pain  #Elevated proBNP  -Echocardiogram shows small area of apical dyskinesis, EF preserved.  Moderate to severe aortic stenosis also noted  -Appreciate any further recommendations from cardiology    #Diarrhea  -1 episode noted so far, will monitor and if continues concern for C. difficile.    #HTN  #HLD  #Type II DM  #Hypothyroidism  -Cont home meds as tolerating, SSI coverage     Dispo: inpatient, possible discharge in the next 24-48 hours.    51 minutes were spent w/ patient and coordinating care.     Cristofer Pyle DO  [facility]  Hospitalist  Contact via [employer]

## 2025-01-12 NOTE — PLAN OF CARE
A&OX3,forgetful. Maintaining O2 on 1L. Tele, NSR. Denies pain. Up with X2 assist. PIV SL. Band aid  on the forehead,mepilex on the sacrum for redness. Carb controlled diet. No further needs at this time. Continue POC. Safety precaution in place.   Problem: RESPIRATORY - ADULT  Goal: Achieves optimal ventilation and oxygenation  Description: INTERVENTIONS:  - Assess for changes in respiratory status  - Assess for changes in mentation and behavior  - Position to facilitate oxygenation and minimize respiratory effort  - Oxygen supplementation based on oxygen saturation or ABGs  - Provide Smoking Cessation handout, if applicable  - Encourage broncho-pulmonary hygiene including cough, deep breathe, Incentive Spirometry  - Assess the need for suctioning and perform as needed  - Assess and instruct to report SOB or any respiratory difficulty  - Respiratory Therapy support as indicated  - Manage/alleviate anxiety  - Monitor for signs/symptoms of CO2 retention  Outcome: Progressing     Problem: Patient/Family Goals  Goal: Patient/Family Long Term Goal  Description: Patient's Long Term Goal: Discharge home    Interventions:  - Follow plan of care, O2  - See additional Care Plan goals for specific interventions  Outcome: Progressing  Goal: Patient/Family Short Term Goal  Description: Patient's Short Term Goal:1/10 noc;safety,sleep  1/11 noc;safety,sleep  Interventions:   - cluster care  - See additional Care Plan goals for specific interventions  Outcome: Progressing     Problem: GASTROINTESTINAL - ADULT  Goal: Maintains or returns to baseline bowel function  Description: INTERVENTIONS:  - Assess bowel function  - Maintain adequate hydration with IV or PO as ordered and tolerated  - Evaluate effectiveness of GI medications  - Encourage mobilization and activity  - Obtain nutritional consult as needed  - Establish a toileting routine/schedule  - Consider collaborating with pharmacy to review patient's medication  profile  Outcome: Progressing     Problem: PAIN - ADULT  Goal: Verbalizes/displays adequate comfort level or patient's stated pain goal  Description: INTERVENTIONS:  - Encourage pt to monitor pain and request assistance  - Assess pain using appropriate pain scale  - Administer analgesics based on type and severity of pain and evaluate response  - Implement non-pharmacological measures as appropriate and evaluate response  - Consider cultural and social influences on pain and pain management  - Manage/alleviate anxiety  - Utilize distraction and/or relaxation techniques  - Monitor for opioid side effects  - Notify MD/LIP if interventions unsuccessful or patient reports new pain  - Anticipate increased pain with activity and pre-medicate as appropriate  Outcome: Progressing     Problem: CARDIOVASCULAR - ADULT  Goal: Maintains optimal cardiac output and hemodynamic stability  Description: INTERVENTIONS:  - Monitor vital signs, rhythm, and trends  - Monitor for bleeding, hypotension and signs of decreased cardiac output  - Evaluate effectiveness of vasoactive medications to optimize hemodynamic stability  - Monitor arterial and/or venous puncture sites for bleeding and/or hematoma  - Assess quality of pulses, skin color and temperature  - Assess for signs of decreased coronary artery perfusion - ex. Angina  - Evaluate fluid balance, assess for edema, trend weights  Outcome: Progressing

## 2025-01-12 NOTE — PROGRESS NOTES
[facility]   part of [facility]     Cardiology Progress Note        Yeny Rust Patient Status:  Inpatient    12/3/1945 MRN [id number]   Location [facility] 5NW-A Attending Bill Dowling, DO   Hosp Day # 4 PCP Thais Echeverria MD         Subjective/ROS:  Feels ok. Reports she only feels cp when she takes a deep breath. Denies any sob and sats good on room air. Can't recall if she is getting up to the chair at all    Objective:  BP (!) 99/29 (BP Location: Right leg)   Pulse 77   Temp 98.2 °F (36.8 °C) (Oral)   Resp 17   Ht 5' 3\" (1.6 m)   Wt 120 lb (54.4 kg)   SpO2 98%   BMI 21.26 kg/m²     Temp (24hrs), Av °F (36.7 °C), Min:97.4 °F (36.3 °C), Max:98.2 °F (36.8 °C)      Tele  sr    Intake/Output:    Intake/Output Summary (Last 24 hours) at 2025 1214  Last data filed at 2025 0701  Gross per 24 hour   Intake 100 ml   Output 1000 ml   Net -900 ml       No data found.     Physical Exam:    Gen: alert, forgetful. NAD. Appears older than stated age. Thin, frail appearing  Heent: pupils equal, reactive. Mucous membranes moist. Forehead with dressing in place   Neck: no jvd  Cardiac: regular rate and rhythm, normal S1,S2 with clear murmur of aortic stenosis  Lungs: bibasilar crackles   Abd: soft, NT/ND +bs  Ext: mild LE edema  Skin: Warm, dry      Labs:  Lab Results   Component Value Date    WBC 8.8 2025    HGB 10.6 2025    HCT 32.5 2025    .0 2025    CREATSERUM 0.64 2025    BUN 8 2025     2025    K 3.8 2025     2025    CO2 26.0 2025     2025    CA 8.8 2025    ALB 3.5 2025    MG 1.3 2025    PHOS 3.7 2025       CXR: Reviewed. New large right upper lobe consolidative infiltrate consistent with pneumonia.  This consolidative infiltrate should be followed to complete resolution to exclude any underlying lung neoplasms.     Medications:     sodium chloride  500 mL  Intravenous Once    atorvastatin  40 mg Oral Nightly    insulin degludec  13 Units Subcutaneous Nightly    levothyroxine  125 mcg Oral Before breakfast    metoprolol succinate ER  50 mg Oral Daily Beta Blocker    mirtazapine  45 mg Oral Nightly    heparin  5,000 Units Subcutaneous 2 times per day    insulin aspart  1-5 Units Subcutaneous TID AC and HS    cefTRIAXone  2 g Intravenous Q24H         Assessment:    RUL pneumonia with associate hypoxia- per primary service  Elevated trop, hx cad with prior bypass-   repeat trop was down trending. 625>596  Complaints of cp for me are with deep breaths  Echo with small area of apical dyskinesis, ef preserved   Moderate to severe aortic stenosis with pk velocity 3.69 m/sec, mean gradient of 29 mmHg, VTI area 0.6cm2  Htn, grade 1 diastolic dysfunction  Hl- statin   DM    Plan:     Follow volume status. Was just given 500 ml saline bolus within the past hr. Bp and cr are ok.   Treatment of pna per primary service  Will ask Dr Benítez, her primary cardiologist to see on Monday. Suspect will continue to treat conservatively      Na Price NP  [phone]

## 2025-01-13 VITALS
RESPIRATION RATE: 17 BRPM | HEART RATE: 88 BPM | BODY MASS INDEX: 21.26 KG/M2 | TEMPERATURE: 98 F | OXYGEN SATURATION: 97 % | HEIGHT: 63 IN | WEIGHT: 120 LBS | SYSTOLIC BLOOD PRESSURE: 134 MMHG | DIASTOLIC BLOOD PRESSURE: 71 MMHG

## 2025-01-13 LAB
ALBUMIN SERPL-MCNC: 3.6 G/DL (ref 3.2–4.8)
ANION GAP SERPL CALC-SCNC: 7 MMOL/L (ref 0–18)
BUN BLD-MCNC: 8 MG/DL (ref 9–23)
CALCIUM BLD-MCNC: 8.8 MG/DL (ref 8.7–10.4)
CHLORIDE SERPL-SCNC: 100 MMOL/L (ref 98–112)
CO2 SERPL-SCNC: 25 MMOL/L (ref 21–32)
CREAT BLD-MCNC: 0.69 MG/DL
EGFRCR SERPLBLD CKD-EPI 2021: 88 ML/MIN/1.73M2 (ref 60–?)
ERYTHROCYTE [DISTWIDTH] IN BLOOD BY AUTOMATED COUNT: 13.8 %
GLUCOSE BLD-MCNC: 241 MG/DL (ref 70–99)
GLUCOSE BLD-MCNC: 246 MG/DL (ref 70–99)
GLUCOSE BLD-MCNC: 299 MG/DL (ref 70–99)
HCT VFR BLD AUTO: 32 %
HGB BLD-MCNC: 10.4 G/DL
MAGNESIUM SERPL-MCNC: 1.4 MG/DL (ref 1.6–2.6)
MCH RBC QN AUTO: 27.6 PG (ref 26–34)
MCHC RBC AUTO-ENTMCNC: 32.5 G/DL (ref 31–37)
MCV RBC AUTO: 84.9 FL
OSMOLALITY SERPL CALC.SUM OF ELEC: 281 MOSM/KG (ref 275–295)
PHOSPHATE SERPL-MCNC: 2.8 MG/DL (ref 2.4–5.1)
PLATELET # BLD AUTO: 408 10(3)UL (ref 150–450)
POTASSIUM SERPL-SCNC: 4.2 MMOL/L (ref 3.5–5.1)
POTASSIUM SERPL-SCNC: 4.2 MMOL/L (ref 3.5–5.1)
RBC # BLD AUTO: 3.77 X10(6)UL
SODIUM SERPL-SCNC: 132 MMOL/L (ref 136–145)
WBC # BLD AUTO: 10.9 X10(3) UL (ref 4–11)

## 2025-01-13 PROCEDURE — 82962 GLUCOSE BLOOD TEST: CPT

## 2025-01-13 PROCEDURE — 94760 N-INVAS EAR/PLS OXIMETRY 1: CPT

## 2025-01-13 PROCEDURE — 84132 ASSAY OF SERUM POTASSIUM: CPT

## 2025-01-13 PROCEDURE — 83735 ASSAY OF MAGNESIUM: CPT

## 2025-01-13 PROCEDURE — 97535 SELF CARE MNGMENT TRAINING: CPT

## 2025-01-13 PROCEDURE — 85027 COMPLETE CBC AUTOMATED: CPT

## 2025-01-13 PROCEDURE — 97530 THERAPEUTIC ACTIVITIES: CPT

## 2025-01-13 PROCEDURE — 97165 OT EVAL LOW COMPLEX 30 MIN: CPT

## 2025-01-13 PROCEDURE — 97116 GAIT TRAINING THERAPY: CPT

## 2025-01-13 PROCEDURE — 80069 RENAL FUNCTION PANEL: CPT

## 2025-01-13 RX ORDER — ASPIRIN 81 MG/1
81 TABLET ORAL DAILY
Qty: 90 TABLET | Refills: 0 | Status: SHIPPED | OUTPATIENT
Start: 2025-01-14

## 2025-01-13 RX ORDER — ASPIRIN 81 MG/1
81 TABLET ORAL DAILY
Status: DISCONTINUED | OUTPATIENT
Start: 2025-01-13 | End: 2025-01-13

## 2025-01-13 RX ORDER — MAGNESIUM OXIDE 400 MG/1
800 TABLET ORAL ONCE
Status: COMPLETED | OUTPATIENT
Start: 2025-01-13 | End: 2025-01-13

## 2025-01-13 NOTE — PROGRESS NOTES
Resting comfortably in chair - off oxygen - no respiratory compromise    Tele sinus    Afebrile  133/64  78 regular    Lungs mild crackles on right  Ht RRR with FORREST  Abd soft  Ext no edema  Neuro intact - generally weak    Negative 3.2 liters since admission    Echo with preserved LV function     ECG with no acute ischemic change    LDL 57      A/P: RUL pneumonia. Underlying CAD s/p remote CABG with NSTEMI and moderate-severe aortic stenosis. Longstanding DM.    Has not seen me in office since May 2023 - will need follow up with me once discharged    Acceptable for discharge from pure cardiac perspective    Continue baseline beta blocker and statin therapy - should be on daily EC ASA 81 mg      Reviewed with patient and her           L2

## 2025-01-13 NOTE — PROGRESS NOTES
NURSING DISCHARGE NOTE    Discharged Home via Ambulatory.  Accompanied by Spouse  Belongings Taken by patient/family.  Discharge instructions discussed with pt and , questions and concerns discussed at bedside. Pt left for home accompanied by her .

## 2025-01-13 NOTE — PROGRESS NOTES
Patient is alert and oriented x 3-4 during the day, afebrile, no c/o pain. RA, abx. VSS. Aspirin started - to continue at home. Tolerates diet, no n/v/d. Patient is incontinent at times, briefed. Ambulates with CG and a walker. Walker and wheelchair available at home. Discharge planning for HH after discharge to continue PT at home. Pt and  agreeable with plan.

## 2025-01-13 NOTE — OCCUPATIONAL THERAPY NOTE
OCCUPATIONAL THERAPY EVALUATION - INPATIENT     Room Number: 527/527-A  Evaluation Date: 1/13/2025  Type of Evaluation: Initial  Presenting Problem: Community acquired pneumonia of right upper lobe of lung    Physician Order: IP Consult to Occupational Therapy  Reason for Therapy: ADL/IADL Dysfunction and Discharge Planning    OCCUPATIONAL THERAPY ASSESSMENT   Patient is currently functioning near baseline with toileting, lower body dressing, bed mobility, transfers, static sitting balance, dynamic sitting balance, static standing balance, dynamic standing balance, maintaining seated position, functional standing tolerance, energy conservation strategies, and aerobic capacity. Prior to admission, patient's baseline is modIND-assist with ADLs from spouse, ambulates with RW.  Patient is requiring minimum assistance as a result of the following impairments: decreased functional strength, decreased endurance, and increased O2 needs from baseline. Occupational Therapy will continue to follow for duration of hospitalization.    Patient will benefit from continued skilled OT Services at discharge to promote prior level of function and safety with additional support and return home with home health OT    History Related to Current Admission: Patient is a 79 year old female admitted on 1/8/2025 with Presenting Problem: Community acquired pneumonia of right upper lobe of lung. Co-Morbidities : diabetes type 2 rheumatoid arthritis history of breast cancer hypothyroidism hyperlipidemia hypertension coronary artery disease    WEIGHT BEARING RESTRICTION       Recommendations for nursing staff:   Transfers: up x 1 assist, CGA, RW  Toileting location: Bedlevel, may progress to bedside commode or toilet with nursing staff.    EVALUATION SESSION:  Patient Start of Session: semi supine in bed    FUNCTIONAL TRANSFER ASSESSMENT  Sit to Stand: Edge of Bed; Chair  Edge of Bed: Minimal Assist  Chair: Contact Guard Assist    BED MOBILITY      BALANCE ASSESSMENT  Static Sitting: Supervision  Static Standing: Contact Guard Assist  Dynamic Sitting: Supervision  Dynamic Standing: Contact Guard Assist    FUNCTIONAL ADL ASSESSMENT  Eating: Independent  LB Dressing Seated: Minimal Assist (adjusted R sock sitting EOB)    ACTIVITY TOLERANCE: Pt tolerated < 5 min dynamic standing activity with CGA and use of RW, pt demonstrated sufficient standing balance to ambulate to bathroom to complete toileting to increase IND with toileting needs.      Pt started session with 2L O2, however O2 removed for standing activity, when finished with standing activity O2 sats at 96% without O2.                   O2 SATURATIONS  Oxygen Therapy  SPO2% on Oxygen at Rest: 97  At rest oxygen flow (liters per minute): 2    COGNITION  Overall Cognitive Status:  WFL - within functional limits    Upper Extremity   ROM: within functional limits  Strength: within functional limits    EDUCATION PROVIDED  Patient Education : Role of Occupational Therapy; Plan of Care; DME Recommendations; Functional Transfer Techniques; Fall Prevention; Posture/Positioning; Energy Conservation; Proper Body Mechanics  Patient's Response to Education: Verbalized Understanding; Returned Demonstration    Equipment used: RW  Demonstrates functional use     Therapist comments: Pt pleasant and cooperative this session. Pt completed bed mobility with increased time and with supervision, required Carloz to adjust sock sitting EOB. Pt demonstrated sufficient standing balance for ambulation into bathroom to increase IND with toileting needs, pt demonstrated sufficient BLE and BUE strength to push up from chair with CGA to simulate toilet transfer. Pt safely seated in chair at end of session with breakfast on tray.    Patient End of Session: Up in chair;Needs met;Call light within reach;RN aware of session/findings;All patient questions and concerns addressed;Hospital anti-slip socks;Alarm set    OCCUPATIONAL  PROFILE    HOME SITUATION  Type of Home: House  Home Layout: Two level  Lives With: Spouse    Toilet and Equipment: Comfort height toilet  Shower/Tub and Equipment: Walk-in shower;Shower chair       Occupation/Status: Retired     Drives: No  Patient Regularly Uses: Rolling walker    Prior Level of Function: Per pt report, lives at home with supportive spouse, pt is typically able to dress self, toilet self, and complete showers by self, pt reports her spouse assists with transfers in and out of shower and on and off toilet. Pt typically uses a RW for mobility, also has a wheelchair at home if needed. Pt reports spouse is able to assist with ADLs as needed. Pt does not use O2 at home.    SUBJECTIVE   \"Definitely\"    PAIN ASSESSMENT  Ratin          OBJECTIVE  Precautions: Bed/chair alarm  Fall Risk: High fall risk    ASSESSMENTS    AM-PAC ‘6-Clicks’ Inpatient Daily Activity Short Form  -   Putting on and taking off regular lower body clothing?: A Little  -   Bathing (including washing, rinsing, drying)?: A Little  -   Toileting, which includes using toilet, bedpan or urinal? : A Little  -   Putting on and taking off regular upper body clothing?: None  -   Taking care of personal grooming such as brushing teeth?: None  -   Eating meals?: None    AM-PAC Score:  Score: 21  Approx Degree of Impairment: 32.79%  Standardized Score (AM-PAC Scale): 44.27    ADDITIONAL TESTS     NEUROLOGICAL FINDINGS      COGNITION ASSESSMENTS     PLAN  OT Device Recommendations: None  OT Treatment Plan: Balance activities;Energy conservation/work simplification techniques;ADL training;IADL training;Functional transfer training;UE strengthening/ROM;Endurance training;Patient/Family education;Patient/Family training;Equipment eval/education;Compensatory technique education;Continued evaluation  Rehab Potential : Good  Frequency: 3-5x/week  Number of Visits to Meet Established Goals: 3    ADL Goals   Patient will perform grooming: with  supervision and while standing at sink  Patient will perform lower body dressing:  with supervision  Patient will perform toileting: with supervision    Functional Transfer Goals  Patient will transfer from sit to stand:  with supervision  Patient will transfer to toilet:  with supervision    Patient Evaluation Complexity Level:   Occupational Profile/Medical History LOW - Brief history including review of medical or therapy records    Specific performance deficits impacting engagement in ADL/IADL LOW  1 - 3 performance deficits    Client Assessment/Performance Deficits MODERATE - Comorbidities and min to mod modifications of tasks    Clinical Decision Making LOW - Analysis of occupational profile, problem-focused assessments, limited treatment options    Overall Complexity LOW     OT Session Time: 18 minutes  Self-Care Home Management: 12 minutes

## 2025-01-13 NOTE — PLAN OF CARE
Patient A&O x 2-3. . Room air. NSR on tele. BP within range for Metoprolol this AM   Heparin as DVT prophylactic. QID accu check   Incontinent x 2.  Carb controlled diet.  Pills 1 x 1 with water. Left arm precautions   PIV to the right wrist. Saline locked. IV abx.   Consults: Hosp/Cards   Plan of care updated. Patient continuously rounded on           Problem: Patient/Family Goals  Goal: Patient/Family Long Term Goal  Description: Patient's Long Term Goal: Discharge home    Interventions:  - Follow plan of care, O2  - See additional Care Plan goals for specific interventions  Outcome: Progressing  Goal: Patient/Family Short Term Goal  Description: Patient's Short Term Goal:1/10 noc;safety,sleep  1/11 noc;safety,sleep  1/12 noc: Sleep   Interventions:   - cluster care  - See additional Care Plan goals for specific interventions  Outcome: Progressing     Problem: RESPIRATORY - ADULT  Goal: Achieves optimal ventilation and oxygenation  Description: INTERVENTIONS:  - Assess for changes in respiratory status  - Assess for changes in mentation and behavior  - Position to facilitate oxygenation and minimize respiratory effort  - Oxygen supplementation based on oxygen saturation or ABGs  - Provide Smoking Cessation handout, if applicable  - Encourage broncho-pulmonary hygiene including cough, deep breathe, Incentive Spirometry  - Assess the need for suctioning and perform as needed  - Assess and instruct to report SOB or any respiratory difficulty  - Respiratory Therapy support as indicated  - Manage/alleviate anxiety  - Monitor for signs/symptoms of CO2 retention  Outcome: Progressing     Problem: CARDIOVASCULAR - ADULT  Goal: Maintains optimal cardiac output and hemodynamic stability  Description: INTERVENTIONS:  - Monitor vital signs, rhythm, and trends  - Monitor for bleeding, hypotension and signs of decreased cardiac output  - Evaluate effectiveness of vasoactive medications to optimize hemodynamic stability  -  Monitor arterial and/or venous puncture sites for bleeding and/or hematoma  - Assess quality of pulses, skin color and temperature  - Assess for signs of decreased coronary artery perfusion - ex. Angina  - Evaluate fluid balance, assess for edema, trend weights  Outcome: Progressing

## 2025-01-13 NOTE — PAYOR COMM NOTE
--------------  CONTINUED STAY REVIEW    Payor: [employer]IC[employer]  Subscriber #:  [id number]  Authorization Number: [id number]    Admit date: 1/8/25  Admit time: 11:36 AM    1/11/25     OBJECTIVE:  /36 (BP Location: Left leg)   Pulse 81   Temp 98.2 °F (36.8 °C) (Axillary)   Resp 16   Ht 5' 3\" (1.6 m)   Wt 120 lb (54.4 kg)   SpO2 98%   BMI 21.26 kg/m²   General:  Alert and oriented.  HEENT:  EOMI, PERRLA.   Pulm: Diminished breath sounds.   CV: Regular rate and rhythm, no murmur.   Abd: Soft, nontender, nondistended.   MSK: Full range of motion in extremities, no obvious deformities.    Skin: No lesions or rashes.  Neuro: No obvious focal deficits.     Lab Results   Component Value Date     WBC 12.7 01/11/2025     HGB 10.6 01/11/2025     HCT 32.1 01/11/2025     .0 01/11/2025     CREATSERUM 0.64 01/11/2025     BUN 7 01/11/2025      01/11/2025     K 3.9 01/11/2025      01/11/2025     CO2 25.0 01/11/2025      01/11/2025     CA 9.2 01/11/2025     PGLU 135 01/11/2025       Assessment/ Plan:      #Lobar pneumonia, RUL  #Leukocytosis   #Acute hypoxia  #Generalized weakness  -CXR reviewed with large RUL infiltrate  -Remains on RA comfortably, desatting at night. Monitor . SpO2>92%  -Cont current Abx.  -Nebs, supportive care PRN     #Chest pain  #Elevated proBNP  -Follow-up echo.  Obtain EKG and troponin.  Cardiology consulted.     #Diarrhea  -1 episode noted so far, will monitor and if continues concern for C. difficile.     #HTN  #HLD  #Type II DM  #Hypothyroidism  -Cont home meds as tolerating, SSI coverage                        1/12/25        BP 91/50 (BP Location: Right leg)   Pulse 79   Temp 98.4 °F (36.9 °C) (Oral)   Resp 17   Ht 5' 3\" (1.6 m)   Wt 120 lb (54.4 kg)   SpO2 92%   BMI 21.26 kg/m²     General:  Alert and oriented.  HEENT:  EOMI, PERRLA.   Pulm: Diminished breath sounds.   CV: Regular rate and rhythm, no murmur.   Abd: Soft, nontender, nondistended.    MSK: Full range of motion in extremities, no obvious deformities.    Skin: No lesions or rashes.  Neuro: No obvious focal deficits.     Lab Results   Component Value Date     WBC 8.8 01/12/2025     HGB 10.6 01/12/2025     HCT 32.5 01/12/2025     .0 01/12/2025     CREATSERUM 0.64 01/12/2025     BUN 8 01/12/2025      01/12/2025     K 3.8 01/12/2025      01/12/2025     CO2 26.0 01/12/2025      01/12/2025     CA 8.8 01/12/2025     ALB 3.5 01/12/2025     MG 1.3 01/12/2025     PHOS 3.7 01/12/2025     PGLU 149 01/12/2025       Assessment/ Plan:      #Lobar pneumonia, RUL  #Leukocytosis   #Acute hypoxia  #Generalized weakness  -CXR reviewed with large RUL infiltrate  -Remains on RA comfortably, desatting at night. Monitor . SpO2>92%  -Cont current Abx.  -Nebs, supportive care PRN     #Chest pain  #Elevated proBNP  -Echocardiogram shows small area of apical dyskinesis, EF preserved.  Moderate to severe aortic stenosis also noted  -Appreciate any further recommendations from cardiology     #Diarrhea  -1 episode noted so far, will monitor and if continues concern for C. difficile.     #HTN  #HLD  #Type II DM  #Hypothyroidism  -Cont home meds as tolerating, SSI coverage                       CARDIOLOGY  Assessment:     RUL pneumonia with associate hypoxia- per primary service  Elevated trop, hx cad with prior bypass-   repeat trop was down trending. 625>596  Complaints of cp for me are with deep breaths  Echo with small area of apical dyskinesis, ef preserved   Moderate to severe aortic stenosis with pk velocity 3.69 m/sec, mean gradient of 29 mmHg, VTI area 0.6cm2  Htn, grade 1 diastolic dysfunction  Hl- statin   DM     Plan:      Follow volume status. Was just given 500 ml saline bolus within the past hr. Bp and cr are ok.   Treatment of pna per primary service    MEDICATIONS ADMINISTERED IN LAST 1 DAY:  atorvastatin (Lipitor) tab 40 mg       Date Action Dose Route User    1/12/2025 2133 Given 40  mg Oral Kourtney Bhatti RN          cefTRIAXone (Rocephin) 2 g in sodium chloride 0.9% 100 mL IVPB-ADDV       Date Action Dose Route User    1/13/2025 0627 New Bag 2 g Intravenous Kourtney Bhatti RN          heparin (Porcine) 5000 UNIT/ML injection 5,000 Units       Date Action Dose Route User    1/13/2025 1019 Given 5,000 Units Subcutaneous (Left Lower Abdomen) Latrice Tellez RN    1/12/2025 2134 Given 5,000 Units Subcutaneous (Left Lower Abdomen) Kourtney Bhatti RN          insulin aspart (NovoLOG) 100 Units/mL FlexPen 1-5 Units       Date Action Dose Route User    1/13/2025 0626 Given 3 Units Subcutaneous (Left Lower Abdomen) Kourtney Bhatti RN    1/12/2025 2134 Given 3 Units Subcutaneous (Right Lower Abdomen) Kourtney Bhatti RN    1/12/2025 1716 Given 1 Units Subcutaneous (Right Upper Abdomen) Stacy Wahl RN          insulin degludec (Tresiba) 100 units/mL flextouch 13 Units       Date Action Dose Route User    1/12/2025 2134 Given 13 Units Subcutaneous (Right Upper Arm) Kourtney Bhatti RN          levothyroxine (Synthroid) tab 125 mcg       Date Action Dose Route User    1/13/2025 0625 Given 125 mcg Oral Kourtney Bhatti RN          magnesium oxide (Mag-Ox) tab 800 mg       Date Action Dose Route User    1/12/2025 1155 Given 800 mg Oral Stacy Wahl RN          magnesium oxide (Mag-Ox) tab 800 mg       Date Action Dose Route User    1/13/2025 1018 Given 800 mg Oral Latrice Tellez RN          metoprolol succinate ER (Toprol XL) 24 hr tab 50 mg       Date Action Dose Route User    1/13/2025 0626 Given 50 mg Oral Kourtney Bhatti RN          potassium chloride (Klor-Con M20) tab 40 mEq       Date Action Dose Route User    1/12/2025 1155 Given 40 mEq Oral Stacy Wahl RN          mirtazapine (Remeron) tab 45 mg       Date Action Dose Route User    1/12/2025 2134 Given 45 mg Oral Kourtney Bhatti RN            Vitals (last day)       Date/Time Temp Pulse Resp BP SpO2 Weight O2 Device O2 Flow Rate (L/min)  Who    01/13/25 0730 98.3 °F (36.8 °C) 78 17 133/64 97 % -- None (Room air) 0 L/min CT    01/13/25 0516 98.3 °F (36.8 °C) 83 18 149/72 97 % -- Nasal cannula 2 L/min DS    01/13/25 0014 99 °F (37.2 °C) 98 16 118/87 -- -- -- -- DS    01/12/25 2022 98.3 °F (36.8 °C) 87 18 115/57 -- -- None (Room air) -- DS    01/12/25 1600 98.4 °F (36.9 °C) 86 18 104/43 90 % -- None (Room air) 0 L/min CT    01/12/25 1115 98.4 °F (36.9 °C) 79 17 91/50 92 % -- None (Room air) 0 L/min CT    01/12/25 0730 98.2 °F (36.8 °C) 77 17 99/29 98 % -- Nasal cannula 2 L/min CT

## 2025-01-13 NOTE — DISCHARGE SUMMARY
General Medicine Discharge Summary     Patient ID:  Yeny Rust  79 year old  12/3/1945    Admit date: 1/8/2025    Discharge date and time: 1/13/2025    Attending Physician: Bill Dowling DO     Consults: IP CONSULT TO SOCIAL WORK  IP CONSULT TO CARDIOLOGY    Primary Care Physician: Thais Echeverria MD     Reason for admission: Pneumonia    Risk For Readmission: Low    Discharge Diagnoses: Hypoxia [R09.02]  Community acquired pneumonia of right upper lobe of lung [J18.9]  See Additional Discharge Diagnoses in Hospital Course    Discharged Condition: good    Follow-up with labs/images appointments: PCP    Exam   Gen: Alert, no acute distress  Heent: Normocephalic, atraumatic, neck supple, EOMI, PERRLA  Pulm: Lungs CTAB, normal respiratory effort  CV:  Regular rate and rhythm, no murmurs/rubs/gallops  Abd: Soft, nontender, nondistended, bowel sounds present  Extremities: No peripheral edema, no clubbing, pulses intact   Skin: No rashes or lesions  Neuro: AOx3, no focal neurologic deficits, CN II-XII grossly intact  Psych: appropriate mood and affect    Hospital Course:  79-year-old female with a history of diabetes, rheumatoid arthritis, hypertension, hyperlipidemia, CAD who presented to the hospital for weakness, confusion for the last several days.  Found to have right upper lobe and middle lobe pneumonia on examination in the ER.  Started on Rocephin and azithromycin, admitted to the hospital.  Also found to have elevated BNP for which cardiology was consulted.  Echocardiogram was completed which showed small area of apical dyskinesis with a EF preserved and moderate/severe aortic stenosis.  No further workup from Cardiology perspective.  Patient symptomatically improved, was on 2 L O2 nasal cannula during hospitalization, weaned to room air. Completed course of rocephin for PNA, discharged on 2 more days of Augmentin to complete course of antibiotics.  Recommend follow-up with PCP in 1 week.    Imaging:  No results found.      Disposition: home    Activity: activity as tolerated    Home Medication Changes:     Med list     Medication List        START taking these medications      amoxicillin clavulanate 875-125 MG Tabs  Commonly known as: Augmentin  Take 1 tablet by mouth 2 (two) times daily for 2 days.            CONTINUE taking these medications      acetaminophen 325 MG Tabs  Commonly known as: Tylenol     hydroCHLOROthiazide 12.5 MG Caps     levothyroxine 125 MCG Tabs  Commonly known as: Synthroid     Lipitor 40 MG Tabs  Generic drug: atorvastatin     metFORMIN 500 MG Tabs  Commonly known as: Glucophage     metoprolol succinate ER 50 MG Tb24  Commonly known as: Toprol XL     mirtazapine 45 MG Tabs  Commonly known as: Remeron     mupirocin 2 % Oint  Commonly known as: Bactroban  Apply 1 Application topically daily.     Toujeo SoloStar 300 UNIT/ML Sopn  Generic drug: Insulin Glargine (1 Unit Dial)     traMADol 50 MG Tabs  Commonly known as: Ultram               Where to Get Your Medications        These medications were sent to [id number] - [place], [place] - [address] [place] [address] [phone], [phone] [address] [facility] [address]      Phone: [phone]   amoxicillin clavulanate 875-125 MG Tabs            Follow-up Information       Dario Benítez MD Follow up in 2 week(s).    Specialty: CARDIOLOGY  Contact information:  [address] [place] [address] [phone]                             DC instructions:      Other Discharge Instructions:         Sometimes managing your health at home requires assistance.   The [facility] team has recognized your preference to use [facility].  They can be reached by phone at [phone].  The fax number for your reference is [phone]. A representative from the home health agency will contact you or your family to schedule your first visit.             I reconciled current and discharge medications on  day of discharge, discussed changes with patient and noted changes above.       Total Time Coordinating Care: 36 minutes.     Patient had opportunity to ask questions and state understanding and agreement with therapeutic plan as outlined.    DO Susy [facility]  Hospitalist  Contact via [employer]

## 2025-01-14 ENCOUNTER — PATIENT OUTREACH (OUTPATIENT)
Dept: CASE MANAGEMENT | Age: 80
End: 2025-01-14

## 2025-01-14 NOTE — PROGRESS NOTES
Hospital Follow up for Diabetes (Discharge 1/13 edw)     PCP   Thais Echeverria   [address]   [address]   [facility] [id number] [phone]   Cardiology   Dario Benítez [address] [address] [phone]   Pt's spouse declined to make follow up appointment at this time     Confirmed with pt's spouse  Closing encounter

## 2025-01-14 NOTE — PHYSICAL THERAPY NOTE
PHYSICAL THERAPY TREATMENT NOTE - INPATIENT    Room Number: 527/527-A     Session: 1     Number of Visits to Meet Established Goals: 5    Presenting Problem: PNA  Co-Morbidities : diabetes type 2 rheumatoid arthritis history of breast cancer hypothyroidism hyperlipidemia hypertension coronary artery disease    PHYSICAL THERAPY ASSESSMENT   Patient demonstrates excellent progress this session, goals  updated to reflect patient performance.      Patient is requiring contact guard assist as a result of the following impairments: decreased functional strength.     Patient continues to function near baseline with bed mobility, transfers, and gait.  Next session anticipate patient to progress gait.  Physical Therapy will continue to follow patient for duration of hospitalization.    Patient continues to benefit from continued skilled PT services: at discharge to promote prior level of function and safety with additional support and return home with home health PT.    PLAN DURING HOSPITALIZATION  Nursing Mobility Recommendation : 1 Assist  PT Device Recommendation: Rolling walker  PT Treatment Plan: Bed mobility;Body mechanics;Coordination;Endurance;Energy conservation;Patient education;Family education;Gait training;Neuromuscular re-educate;Range of motion;Strengthening;Stoop training;Stair training;Transfer training;Balance training  Frequency (Obs): 3-5x/week     CURRENT GOALS     Goal #1 Patient is able to demonstrate supine - sit EOB @ level: supervision      Goal #2 Patient is able to demonstrate transfers EOB to/from Chair/Wheelchair at assistance level: supervision      Goal #3 Patient is able to ambulate 50 feet with assist device: walker - rolling at assistance level: supervision      Goal #4     Goal #5     Goal #6     Goal Comments: Goals established on 1/9/2025, progressing towards all 1/13/25.    SUBJECTIVE  \"I feel pretty good.\"      OBJECTIVE  Precautions: Bed/chair alarm    WEIGHT BEARING RESTRICTION      PAIN ASSESSMENT   Ratin  Location: BL knees  Management Techniques: Activity promotion;Body mechanics;Repositioning    BALANCE                                                                                                                       Static Sitting: Good  Dynamic Sitting: Good           Static Standing: Fair -  Dynamic Standing: Fair -    ACTIVITY TOLERANCE                         O2 WALK       AM-PAC '6-Clicks' INPATIENT SHORT FORM - BASIC MOBILITY  How much difficulty does the patient currently have...  Patient Difficulty: Turning over in bed (including adjusting bedclothes, sheets and blankets)?: A Little   Patient Difficulty: Sitting down on and standing up from a chair with arms (e.g., wheelchair, bedside commode, etc.): A Little   Patient Difficulty: Moving from lying on back to sitting on the side of the bed?: A Little   How much help from another person does the patient currently need...   Help from Another: Moving to and from a bed to a chair (including a wheelchair)?: A Little   Help from Another: Need to walk in hospital room?: A Little   Help from Another: Climbing 3-5 steps with a railing?: A Lot     AM-PAC Score:  Raw Score: 17   Approx Degree of Impairment: 50.57%   Standardized Score (AM-PAC Scale): 42.13   CMS Modifier (G-Code): CK    FUNCTIONAL ABILITY STATUS  Gait Assessment   Functional Mobility/Gait Assessment  Gait Assistance: Contact guard assist  Distance (ft): 20  Assistive Device: Rolling walker  Pattern: Shuffle    Skilled Therapy Provided    Bed Mobility:  Rolling: supervision   Supine<>Sit: supervision   Sit<>Supine: NT     Transfer Mobility:  Sit<>Stand: CGA   Stand<>Sit: CGA   Gait: Ambulation completed as above.  Initially to chair, then further into hallway.  Pt with some fatigue noted.      Therapist's Comments: Educated to be OOB to chair, to ambulate frequently while in house.  RN, pt, and SW aware of anticipated PT needs at this time.         Patient End of  Session: Up in chair;Needs met;RN aware of session/findings;Call light within reach;All patient questions and concerns addressed    PT Session Time: 23 minutes  Gait Training: 15 minutes  Therapeutic Activity: 8 minutes  s

## 2025-01-15 NOTE — PAYOR COMM NOTE
--------------  DISCHARGE REVIEW    Payor: [employer]IC[employer]  Subscriber #:  [id number]  Authorization Number: [id number]    Admit date: 1/8/25  Admit time:  11:36 AM  Discharge Date: 1/13/2025  4:45 PM     Admitting Physician: Ranjeet Cruz MD  Attending Physician:  Liana att. providers found  Primary Care Physician: Thais Echeverria MD          Discharge Summary Notes        Discharge Summary signed by Cristofer Pyle DO at 1/13/2025 12:37 PM       Author: Cristofer Pyle DO Specialty: HOSPITALIST Author Type: Physician    Filed: 1/13/2025 12:37 PM Date of Service: 1/13/2025 12:33 PM Status: Signed    : Cristofer Pyle DO (Physician)           General Medicine Discharge Summary     Patient ID:  Yeny Rust  79 year old  12/3/1945    Admit date: 1/8/2025    Discharge date and time: 1/13/2025    Attending Physician: Bill Dowling DO     Consults: IP CONSULT TO SOCIAL WORK  IP CONSULT TO CARDIOLOGY    Primary Care Physician: Thais Echeverria MD     Reason for admission: Pneumonia    Risk For Readmission: Low    Discharge Diagnoses: Hypoxia [R09.02]  Community acquired pneumonia of right upper lobe of lung [J18.9]  See Additional Discharge Diagnoses in Hospital Course    Discharged Condition: good    Follow-up with labs/images appointments: PCP    Exam   Gen: Alert, no acute distress  Heent: Normocephalic, atraumatic, neck supple, EOMI, PERRLA  Pulm: Lungs CTAB, normal respiratory effort  CV:  Regular rate and rhythm, no murmurs/rubs/gallops  Abd: Soft, nontender, nondistended, bowel sounds present  Extremities: No peripheral edema, no clubbing, pulses intact   Skin: No rashes or lesions  Neuro: AOx3, no focal neurologic deficits, CN II-XII grossly intact  Psych: appropriate mood and affect    Hospital Course:  79-year-old female with a history of diabetes, rheumatoid arthritis, hypertension, hyperlipidemia, CAD who presented to the hospital for weakness, confusion for the last several days.  Found to have  right upper lobe and middle lobe pneumonia on examination in the ER.  Started on Rocephin and azithromycin, admitted to the hospital.  Also found to have elevated BNP for which cardiology was consulted.  Echocardiogram was completed which showed small area of apical dyskinesis with a EF preserved and moderate/severe aortic stenosis.  No further workup from Cardiology perspective.  Patient symptomatically improved, was on 2 L O2 nasal cannula during hospitalization, weaned to room air. Completed course of rocephin for PNA, discharged on 2 more days of Augmentin to complete course of antibiotics.  Recommend follow-up with PCP in 1 week.    Imaging: No results found.      Disposition: home    Activity: activity as tolerated    Home Medication Changes:     Med list     Medication List        START taking these medications      amoxicillin clavulanate 875-125 MG Tabs  Commonly known as: Augmentin  Take 1 tablet by mouth 2 (two) times daily for 2 days.            CONTINUE taking these medications      acetaminophen 325 MG Tabs  Commonly known as: Tylenol     hydroCHLOROthiazide 12.5 MG Caps     levothyroxine 125 MCG Tabs  Commonly known as: Synthroid     Lipitor 40 MG Tabs  Generic drug: atorvastatin     metFORMIN 500 MG Tabs  Commonly known as: Glucophage     metoprolol succinate ER 50 MG Tb24  Commonly known as: Toprol XL     mirtazapine 45 MG Tabs  Commonly known as: Remeron     mupirocin 2 % Oint  Commonly known as: Bactroban  Apply 1 Application topically daily.     Toujeo SoloStar 300 UNIT/ML Sopn  Generic drug: Insulin Glargine (1 Unit Dial)     traMADol 50 MG Tabs  Commonly known as: Ultram               Where to Get Your Medications        These medications were sent to [id number] - [place], [place] - [address] [facility] [phone], [phone]  [facility] [address]      Phone: [phone]   amoxicillin clavulanate 875-125 MG Tabs         FU   Follow-up Information       Dario Benítez MD  Follow up in 2 week(s).    Specialty: CARDIOLOGY  Contact information:  [facility]  [address] [facility] [address]  [phone]                             DC instructions:      Other Discharge Instructions:         Sometimes managing your health at home requires assistance.   The [facility] team has recognized your preference to use [facility].  They can be reached by phone at [phone].  The fax number for your reference is [phone]. A representative from the home health agency will contact you or your family to schedule your first visit.             I reconciled current and discharge medications on day of discharge, discussed changes with patient and noted changes above.       Total Time Coordinating Care: 36 minutes.     Patient had opportunity to ask questions and state understanding and agreement with therapeutic plan as outlined.    Cristofer Pyle DO  [facility] ERist  Contact via [employer]        Electronically signed by Cristofer Pyle DO on 1/13/2025 12:37 PM         REVIEWER COMMENTS

## 2025-03-12 ENCOUNTER — HOSPITAL ENCOUNTER (OUTPATIENT)
Facility: HOSPITAL | Age: 80
Setting detail: OBSERVATION
Discharge: HOME HEALTH | End: 2025-03-15
Attending: EMERGENCY MEDICINE | Admitting: INTERNAL MEDICINE
Payer: MEDICARE

## 2025-03-12 ENCOUNTER — APPOINTMENT (OUTPATIENT)
Dept: CT IMAGING | Facility: HOSPITAL | Age: 80
End: 2025-03-12
Attending: EMERGENCY MEDICINE
Payer: MEDICARE

## 2025-03-12 DIAGNOSIS — R40.4 TRANSIENT ALTERATION OF AWARENESS: Primary | ICD-10-CM

## 2025-03-12 DIAGNOSIS — R73.9 HYPERGLYCEMIA: ICD-10-CM

## 2025-03-12 LAB
ALBUMIN SERPL-MCNC: 4.1 G/DL (ref 3.2–4.8)
ALBUMIN/GLOB SERPL: 0.9 {RATIO} (ref 1–2)
ALP LIVER SERPL-CCNC: 104 U/L
ALT SERPL-CCNC: <7 U/L
ANION GAP SERPL CALC-SCNC: 9 MMOL/L (ref 0–18)
AST SERPL-CCNC: 10 U/L (ref ?–34)
BASOPHILS # BLD AUTO: 0.07 X10(3) UL (ref 0–0.2)
BASOPHILS NFR BLD AUTO: 0.7 %
BILIRUB SERPL-MCNC: 0.2 MG/DL (ref 0.2–1.1)
BUN BLD-MCNC: 12 MG/DL (ref 9–23)
CALCIUM BLD-MCNC: 9 MG/DL (ref 8.7–10.6)
CHLORIDE SERPL-SCNC: 92 MMOL/L (ref 98–112)
CO2 SERPL-SCNC: 29 MMOL/L (ref 21–32)
CREAT BLD-MCNC: 1 MG/DL
EGFRCR SERPLBLD CKD-EPI 2021: 57 ML/MIN/1.73M2 (ref 60–?)
EOSINOPHIL # BLD AUTO: 0.37 X10(3) UL (ref 0–0.7)
EOSINOPHIL NFR BLD AUTO: 3.7 %
ERYTHROCYTE [DISTWIDTH] IN BLOOD BY AUTOMATED COUNT: 14.6 %
FOLATE SERPL-MCNC: 8.3 NG/ML (ref 5.4–?)
GLOBULIN PLAS-MCNC: 4.7 G/DL (ref 2–3.5)
GLUCOSE BLD-MCNC: 170 MG/DL (ref 70–99)
GLUCOSE BLD-MCNC: 198 MG/DL (ref 70–99)
GLUCOSE BLD-MCNC: 268 MG/DL (ref 70–99)
GLUCOSE BLD-MCNC: 324 MG/DL (ref 70–99)
GLUCOSE BLD-MCNC: 379 MG/DL (ref 70–99)
GLUCOSE BLD-MCNC: 393 MG/DL (ref 70–99)
HCT VFR BLD AUTO: 36.2 %
HGB BLD-MCNC: 11.6 G/DL
IMM GRANULOCYTES # BLD AUTO: 0.05 X10(3) UL (ref 0–1)
IMM GRANULOCYTES NFR BLD: 0.5 %
LYMPHOCYTES # BLD AUTO: 2.98 X10(3) UL (ref 1–4)
LYMPHOCYTES NFR BLD AUTO: 30 %
MCH RBC QN AUTO: 26.9 PG (ref 26–34)
MCHC RBC AUTO-ENTMCNC: 32 G/DL (ref 31–37)
MCV RBC AUTO: 84 FL
MONOCYTES # BLD AUTO: 0.63 X10(3) UL (ref 0.1–1)
MONOCYTES NFR BLD AUTO: 6.4 %
NEUTROPHILS # BLD AUTO: 5.82 X10 (3) UL (ref 1.5–7.7)
NEUTROPHILS # BLD AUTO: 5.82 X10(3) UL (ref 1.5–7.7)
NEUTROPHILS NFR BLD AUTO: 58.7 %
OSMOLALITY SERPL CALC.SUM OF ELEC: 285 MOSM/KG (ref 275–295)
PLATELET # BLD AUTO: 432 10(3)UL (ref 150–450)
POTASSIUM SERPL-SCNC: 4.2 MMOL/L (ref 3.5–5.1)
PROT SERPL-MCNC: 8.8 G/DL (ref 5.7–8.2)
RBC # BLD AUTO: 4.31 X10(6)UL
SODIUM SERPL-SCNC: 130 MMOL/L (ref 136–145)
VIT B12 SERPL-MCNC: 531 PG/ML (ref 211–911)
WBC # BLD AUTO: 9.9 X10(3) UL (ref 4–11)

## 2025-03-12 PROCEDURE — 70450 CT HEAD/BRAIN W/O DYE: CPT | Performed by: EMERGENCY MEDICINE

## 2025-03-12 RX ORDER — ASPIRIN 81 MG/1
81 TABLET ORAL DAILY
Status: DISCONTINUED | OUTPATIENT
Start: 2025-03-13 | End: 2025-03-15

## 2025-03-12 RX ORDER — ONDANSETRON 2 MG/ML
4 INJECTION INTRAMUSCULAR; INTRAVENOUS EVERY 6 HOURS PRN
Status: DISCONTINUED | OUTPATIENT
Start: 2025-03-12 | End: 2025-03-15

## 2025-03-12 RX ORDER — INSULIN DEGLUDEC 100 U/ML
20 INJECTION, SOLUTION SUBCUTANEOUS NIGHTLY
Status: DISCONTINUED | OUTPATIENT
Start: 2025-03-12 | End: 2025-03-15

## 2025-03-12 RX ORDER — METOPROLOL SUCCINATE 50 MG/1
50 TABLET, EXTENDED RELEASE ORAL DAILY
Status: DISCONTINUED | OUTPATIENT
Start: 2025-03-13 | End: 2025-03-15

## 2025-03-12 RX ORDER — ACETAMINOPHEN 500 MG
500 TABLET ORAL EVERY 4 HOURS PRN
Status: DISCONTINUED | OUTPATIENT
Start: 2025-03-12 | End: 2025-03-15

## 2025-03-12 RX ORDER — METOCLOPRAMIDE HYDROCHLORIDE 5 MG/ML
5 INJECTION INTRAMUSCULAR; INTRAVENOUS EVERY 8 HOURS PRN
Status: DISCONTINUED | OUTPATIENT
Start: 2025-03-12 | End: 2025-03-15

## 2025-03-12 RX ORDER — DEXTROSE MONOHYDRATE 25 G/50ML
50 INJECTION, SOLUTION INTRAVENOUS
Status: DISCONTINUED | OUTPATIENT
Start: 2025-03-12 | End: 2025-03-15

## 2025-03-12 RX ORDER — NICOTINE POLACRILEX 4 MG
15 LOZENGE BUCCAL
Status: DISCONTINUED | OUTPATIENT
Start: 2025-03-12 | End: 2025-03-15

## 2025-03-12 RX ORDER — NICOTINE POLACRILEX 4 MG
30 LOZENGE BUCCAL
Status: DISCONTINUED | OUTPATIENT
Start: 2025-03-12 | End: 2025-03-15

## 2025-03-12 RX ORDER — ATORVASTATIN CALCIUM 40 MG/1
40 TABLET, FILM COATED ORAL NIGHTLY
Status: DISCONTINUED | OUTPATIENT
Start: 2025-03-12 | End: 2025-03-15

## 2025-03-12 RX ORDER — ENOXAPARIN SODIUM 100 MG/ML
40 INJECTION SUBCUTANEOUS DAILY
Status: DISCONTINUED | OUTPATIENT
Start: 2025-03-13 | End: 2025-03-15

## 2025-03-12 RX ORDER — INSULIN ASPART 100 [IU]/ML
0.15 INJECTION, SOLUTION INTRAVENOUS; SUBCUTANEOUS ONCE
Status: COMPLETED | OUTPATIENT
Start: 2025-03-12 | End: 2025-03-12

## 2025-03-12 NOTE — ED INITIAL ASSESSMENT (HPI)
Brought by medic from home with complaints of high blood sugar . Patient is diabetic on insulin . Patient did not took her insulin today because he did not  eat much . Patient is   oriented x3. Denies any pain   sugar is 392 mg/dl

## 2025-03-12 NOTE — ED QUICK NOTES
Orders for admission, patient is aware of plan and ready to go upstairs. Any questions, please call ED RN  flores at extension [phone]    Patient Covid vaccination status: Fully vaccinated     COVID Test Ordered in ED: None    COVID Suspicion at Admission: N/A    Running Infusions:  None    Mental Status/LOC at time of transport: oriented x3    Other pertinent information: na  CIWA score: N/A   NIH score:  2

## 2025-03-12 NOTE — H&P
Formerly [facility] Hospitalist History and Physical      PCP: Joshua Haque MD    History of Present Illness: This is the story of Ms. Yeny Ruts who is a 78 y/o F w/ Pmhx of Htn, HLD, HypoT, T2DM, Breast/Basal Cell/Squamous Cell Ca Hx who presents to the hospital after being found altered in her home. Found by her  who states she didn't eat a lot and as such didn't give her her insulin today.  is not available at bedside for questioning, and patient is unable to give me much of history as she doesn't remember. Doesn't remember her LKN either. As per EMS patient was found head slumped down, drooling and unable to answer questions. No overt facial droop as per EMS reports, patient denies any focal weakness/sensation/speech changes.   ED Vitals: HTN  Labs:    HgB 11.6  CTH negative    Neurology was consulted from ED who recommended admission for further workup with possible EEG.    Past Medical History:    Actinic keratosis    Basal cell carcinoma    Breast cancer (HCC)    Invasive Ductal Carcinoma    CAD (coronary artery disease)    Diabetes (HCC)    Essential hypertension    Hyperlipidemia    Hypothyroidism    Muscle weakness    Osteoarthritis    Personal history of antineoplastic chemotherapy    Rheumatoid arthritis involving multiple sites with positive rheumatoid factor (HCC)    Squamous cell carcinoma    Type 2 diabetes mellitus (HCC)      Past Surgical History:   Procedure Laterality Date    Cabg      Colonoscopy  6/2010    diverticulosis    Implant left  Nov. 2011    Reconstructive for mastectomy    Mastectomy left Left 9/21/10    Invasive Ductal CA    Other surgical history      basal cell removal    Removal of tunneled cva device, with subq port or pump, central or peripheral insertion  2/16/2012    Procedure: PORT-A-CATH REMOVAL;  Surgeon: Ozzy Ortiz MD;  Location: [facility]    Tonsillectomy          Prior to Admission Medications   Medication Sig    aspirin 81 MG  Oral Tab EC Take 1 tablet (81 mg total) by mouth daily.    metFORMIN 500 MG Oral Tab Take 2 tablets (1,000 mg total) by mouth 2 (two) times daily with meals.    acetaminophen 325 MG Oral Tab Take 2 tablets (650 mg total) by mouth daily as needed for Pain.    mirtazapine 45 MG Oral Tab Take 1 tablet (45 mg total) by mouth nightly.    metoprolol succinate ER 50 MG Oral Tablet 24 Hr Take 1 tablet (50 mg total) by mouth daily.    mupirocin 2 % External Ointment Apply 1 Application topically daily. (Patient taking differently: Apply 1 Application topically as needed (apply forehead).)    Insulin Glargine, 1 Unit Dial, (TOUJEO SOLOSTAR) 300 UNIT/ML Subcutaneous Solution Pen-injector Inject 20 Units into the skin every evening. (Patient taking differently: Inject 26 Units into the skin every evening. Pt only takes 10 units if not eating)    traMADol 50 MG Oral Tab Take 1 tablet (50 mg total) by mouth daily as needed for Pain.    Levothyroxine Sodium 125 MCG Oral Tab Take 1 tablet (125 mcg total) by mouth every morning.    hydrochlorothiazide 12.5 MG Oral Cap Take 1 capsule (12.5 mg total) by mouth daily.    Atorvastatin Calcium (LIPITOR) 40 MG Oral Tab Take 1 tablet (40 mg total) by mouth nightly.       Social History     Tobacco Use    Smoking status: Never    Smokeless tobacco: Never   Substance Use Topics    Alcohol use: No        OBJECTIVE:  BP (!) 170/85   Pulse 72   Temp 97.6 °F (36.4 °C) (Temporal)   Resp 16   Ht 5' 3\" (1.6 m)   Wt 160 lb (72.6 kg)   SpO2 100%   BMI 28.34 kg/m²   Gen: No acute distress  Pulm: Lungs clear bilaterally, normal respiratory effort  CV: Heart with regular rate and rhythm  Abd: Abdomen soft, nontender, non-distended  Skin: no rashes or lesions, scattered ecchymoses noted  Extremities: no edema noted  Neuro:  no focal deficits, AAO1-2      Data Review:    Pertinent Labs and Imaging independently reviewed    Assessment/Plan:   Outpatient records or previous hospital records reviewed.    [facility] Hospitalist to continue to follow patient while in house    A/P: 78 y/o F w/ Pmhx of CAD s/p CABG, Htn, HLD, HypoT, T2DM, Breast/Basal Cell/Squamous Cell Ca Hx who presents to the hospital after being found altered in her home    Acute Metabolic Encephalopathy  Differential includes hypo/hyperglycemia, Seizures, TIA/CVA  CTH: noted  Plan:  - Neurology on consult  - EEG ordered  - no focal weakness/deficits noted on exam, low suspicion for CVA, defer need for MRI/MRA to Neurology  - B12/Folate ordered  - PT/OT evaluations    T2DM  - Tresiba 20, LDISS  - Will give LR bolus of 500 on the floors    Hx of CAD s/p CABG  HLD  - ASA/Statin    HypoT  - Synthroid    A total of 38 minutes taken with patient and coordinating care.  Greater than 50% face to face encounter.      Von Farias D.O.  [facility]

## 2025-03-12 NOTE — ED PROVIDER NOTES
Patient Seen in: [facility] Emergency Department      History     Chief Complaint   Patient presents with    Hyperglycemia     Stated Complaint:     Subjective:   HPI      79-year-old female with history of insulin-dependent diabetes brought in by paramedics after she was found to be altered and have hyperglycemia.   reports that she did not eat much today so he did not give her any of her insulin today.  He says she seems better at this time.  He reports that when he checked on her her head was slumped down and she was drooling and he answer some questions and her speech was very mumbled.  She has no recollection of any of this.  He did not notice a facial droop at this time.  He called 911 immediately because he thought she could be having a stroke.  He says that by the time the paramedics were loading her into the ambulance she seemed to be speaking more clearly.  In discussing the days events with her she does not remember eating the soup he prepared her for lunch.  She does not remember any events today prior to being in the ambulance.      Objective:     Past Medical History:    Actinic keratosis    Basal cell carcinoma    Breast cancer (HCC)    Invasive Ductal Carcinoma    CAD (coronary artery disease)    Diabetes (HCC)    Essential hypertension    Hyperlipidemia    Hypothyroidism    Muscle weakness    Osteoarthritis    Personal history of antineoplastic chemotherapy    Rheumatoid arthritis involving multiple sites with positive rheumatoid factor (HCC)    Squamous cell carcinoma    Type 2 diabetes mellitus (HCC)              Past Surgical History:   Procedure Laterality Date    Cabg      Colonoscopy  6/2010    diverticulosis    Implant left  Nov. 2011    Reconstructive for mastectomy    Mastectomy left Left 9/21/10    Invasive Ductal CA    Other surgical history      basal cell removal    Removal of tunneled cva device, with subq port or pump, central or peripheral insertion  2/16/2012     Procedure: PORT-A-CATH REMOVAL;  Surgeon: Ozzy Ortiz MD;  Location: [facility]    Tonsillectomy                  Social History     Socioeconomic History    Marital status:    Tobacco Use    Smoking status: Never    Smokeless tobacco: Never   Vaping Use    Vaping status: Never Used   Substance and Sexual Activity    Alcohol use: No    Drug use: No     Social Drivers of Health     Food Insecurity: No Food Insecurity (3/12/2025)    NCSS - Food Insecurity     Worried About Running Out of Food in the Last Year: No     Ran Out of Food in the Last Year: No   Transportation Needs: No Transportation Needs (3/12/2025)    NCSS - Transportation     Lack of Transportation: No   Housing Stability: Not At Risk (3/12/2025)    NCSS - Housing/Utilities     Has Housing: Yes     Worried About Losing Housing: No     Unable to Get Utilities: No                  Physical Exam     ED Triage Vitals   BP 03/12/25 1412 (!) 190/84   Pulse 03/12/25 1412 84   Resp 03/12/25 1412 16   Temp 03/12/25 1425 97.6 °F (36.4 °C)   Temp src 03/12/25 1425 Temporal   SpO2 03/12/25 1412 100 %   O2 Device 03/12/25 1412 None (Room air)       Current Vitals:   Vital Signs  BP: 137/70  Pulse: 71  Resp: 16  Temp: 97.7 °F (36.5 °C)  Temp src: Oral  MAP (mmHg): 91    Oxygen Therapy  SpO2: 100 %  O2 Device: None (Room air)  Pulse Oximetry Type: Continuous  Oximetry Probe Site Changed: No  Pulse Ox Probe Location: Right hand        Physical Exam  General:  Vitals as listed.  No acute distress   HEENT: Sclerae anicteric.  Conjunctivae show no pallor.  Oropharynx clear, mucous membranes moist   Neck: supple, no rigidity   Lungs: good air exchange and clear   Heart: regular rate rhythm and no murmur   Abdomen: Soft and nontender.  No abdominal masses.  No peritoneal signs   Extremities: no edema, normal peripheral pulses   Neuro: Alert oriented and nonfocal   Skin: no rashes or nodules    ED Course     Labs Reviewed   CBC WITH DIFFERENTIAL WITH  PLATELET - Abnormal; Notable for the following components:       Result Value    HGB 11.6 (*)     All other components within normal limits   COMP METABOLIC PANEL (14) - Abnormal; Notable for the following components:    Glucose 379 (*)     Sodium 130 (*)     Chloride 92 (*)     eGFR-Cr 57 (*)     ALT <7 (*)     Total Protein 8.8 (*)     Globulin  4.7 (*)     A/G Ratio 0.9 (*)     All other components within normal limits   POCT GLUCOSE - Abnormal; Notable for the following components:    POC Glucose 393 (*)     All other components within normal limits   POCT GLUCOSE - Abnormal; Notable for the following components:    POC Glucose 324 (*)     All other components within normal limits   POCT GLUCOSE - Abnormal; Notable for the following components:    POC Glucose 268 (*)     All other components within normal limits   POCT GLUCOSE - Abnormal; Notable for the following components:    POC Glucose 170 (*)     All other components within normal limits   POCT GLUCOSE - Abnormal; Notable for the following components:    POC Glucose 198 (*)     All other components within normal limits   VITAMIN B12   FOLIC ACID SERUM(FOLATE)   RAINBOW DRAW LAVENDER   RAINBOW DRAW LIGHT GREEN   RAINBOW DRAW BLUE            CT BRAIN OR HEAD (CPT=70450)    Result Date: 3/12/2025  PROCEDURE:  CT BRAIN OR HEAD (60052)  COMPARISON:  ANN CT, CT BRAIN OR HEAD (56471), 7/02/2024, 4:45 AM.  INDICATIONS:  AMS altered mental status  TECHNIQUE:  Noncontrast CT scanning is performed through the brain. Dose reduction techniques were used. Dose information is transmitted to the ACR (American College of Radiology) NRDR (National Radiology Data Registry) which includes the Dose Index Registry.  PATIENT STATED HISTORY: (As transcribed by Technologist)  Patient presents with altered mental status.    FINDINGS:   VENTRICLES/SULCI:   Ventricles and sulci are prominent indicating atrophy.  INTRACRANIAL:  There are no abnormal extraaxial fluid collections.   There is no midline shift.  There are no acute appearing intraparenchymal brain abnormalities.  There is nothing specific for acute territorial infarct.  There is no hemorrhage or mass lesion.  There is chronic microvascular ischemic white matter disease in the cerebrum bilaterally.  SINUSES:  No acute sinusitis.   MASTOIDS:  The mastoids are clear.  SKULL:  No evidence for fracture or acute osseous abnormality.  OTHER:  None.            CONCLUSION:  Chronic changes in the brain, as described, but no acute intracranial bleed, or other acute intracranial process identified.   LOCATION:  [id number]   Dictated by (CST): Royal Weinberg MD on 3/12/2025 at 5:54 PM     Finalized by (CST): Royal Weinberg MD on 3/12/2025 at 5:57 PM             MDM      79-year-old female brought in by EMS after an episode of altered mental status at home.   describes mumbled speech with drooling and the patient has no recollection of this episode.  She was found to be quite hyperglycemic with a blood sugar in the 390 range.  At this time she is at baseline.    Differential includes but is not limited to encephalopathy, seizure, TIA, a life threat.    CBC, CMP, urinalysis, CT brain ordered for further evaluation.    My independent interpretation of CT of the brain is that there is cerebral atrophy.  No obvious acute intracranial hemorrhage.  Also reviewed radiology documentation which describes no acute findings.    I do have concern that patient's symptoms today could be related to encephalopathy versus syncope versus seizure versus TIA.    Discussed with neurology who agrees with plan for hospitalization for further evaluation of this episode.  They do feel it is possibly encephalopathy related to hyperglycemia but agree with need for further workup.  Discussed with patient and family and they are agreeable to plan for hospitalization.  Admitted to the [facility].        Admission disposition: 3/12/2025  5:35 PM           Medical  Decision Making      Disposition and Plan     Clinical Impression:  1. Transient alteration of awareness    2. Hyperglycemia         Disposition:  Admit  3/12/2025  5:35 pm    Follow-up:  No follow-up provider specified.        Medications Prescribed:  Current Discharge Medication List              Supplementary Documentation:         Hospital Problems       Present on Admission  Date Reviewed: 5/31/2024            ICD-10-CM Noted POA    * (Principal) Transient alteration of awareness R40.4 3/12/2025 Unknown    Hyperglycemia R73.9 3/12/2025 Unknown

## 2025-03-13 ENCOUNTER — NURSE ONLY (OUTPATIENT)
Dept: ELECTROPHYSIOLOGY | Facility: HOSPITAL | Age: 80
End: 2025-03-13
Attending: INTERNAL MEDICINE
Payer: MEDICARE

## 2025-03-13 PROBLEM — R40.4 TRANSIENT ALTERATION OF AWARENESS: Status: RESOLVED | Noted: 2025-03-12 | Resolved: 2025-03-13

## 2025-03-13 PROBLEM — R73.9 HYPERGLYCEMIA: Status: RESOLVED | Noted: 2025-03-12 | Resolved: 2025-03-13

## 2025-03-13 LAB
ANION GAP SERPL CALC-SCNC: 8 MMOL/L (ref 0–18)
BASOPHILS # BLD AUTO: 0.12 X10(3) UL (ref 0–0.2)
BASOPHILS NFR BLD AUTO: 0.8 %
BUN BLD-MCNC: 10 MG/DL (ref 9–23)
CALCIUM BLD-MCNC: 9.2 MG/DL (ref 8.7–10.6)
CHLORIDE SERPL-SCNC: 99 MMOL/L (ref 98–112)
CO2 SERPL-SCNC: 30 MMOL/L (ref 21–32)
CREAT BLD-MCNC: 0.8 MG/DL
EGFRCR SERPLBLD CKD-EPI 2021: 75 ML/MIN/1.73M2 (ref 60–?)
EOSINOPHIL # BLD AUTO: 0.64 X10(3) UL (ref 0–0.7)
EOSINOPHIL NFR BLD AUTO: 4.5 %
ERYTHROCYTE [DISTWIDTH] IN BLOOD BY AUTOMATED COUNT: 14.8 %
GLUCOSE BLD-MCNC: 151 MG/DL (ref 70–99)
GLUCOSE BLD-MCNC: 151 MG/DL (ref 70–99)
GLUCOSE BLD-MCNC: 231 MG/DL (ref 70–99)
GLUCOSE BLD-MCNC: 254 MG/DL (ref 70–99)
GLUCOSE BLD-MCNC: 317 MG/DL (ref 70–99)
GLUCOSE BLD-MCNC: 371 MG/DL (ref 70–99)
HCT VFR BLD AUTO: 33.3 %
HGB BLD-MCNC: 10.8 G/DL
IMM GRANULOCYTES # BLD AUTO: 0.07 X10(3) UL (ref 0–1)
IMM GRANULOCYTES NFR BLD: 0.5 %
LYMPHOCYTES # BLD AUTO: 3.58 X10(3) UL (ref 1–4)
LYMPHOCYTES NFR BLD AUTO: 25.1 %
MAGNESIUM SERPL-MCNC: 1.5 MG/DL (ref 1.6–2.6)
MCH RBC QN AUTO: 27.3 PG (ref 26–34)
MCHC RBC AUTO-ENTMCNC: 32.4 G/DL (ref 31–37)
MCV RBC AUTO: 84.1 FL
MONOCYTES # BLD AUTO: 1.27 X10(3) UL (ref 0.1–1)
MONOCYTES NFR BLD AUTO: 8.9 %
NEUTROPHILS # BLD AUTO: 8.57 X10 (3) UL (ref 1.5–7.7)
NEUTROPHILS # BLD AUTO: 8.57 X10(3) UL (ref 1.5–7.7)
NEUTROPHILS NFR BLD AUTO: 60.2 %
OSMOLALITY SERPL CALC.SUM OF ELEC: 286 MOSM/KG (ref 275–295)
PLATELET # BLD AUTO: 440 10(3)UL (ref 150–450)
POTASSIUM SERPL-SCNC: 3.6 MMOL/L (ref 3.5–5.1)
RBC # BLD AUTO: 3.96 X10(6)UL
SODIUM SERPL-SCNC: 137 MMOL/L (ref 136–145)
WBC # BLD AUTO: 14.3 X10(3) UL (ref 4–11)

## 2025-03-13 PROCEDURE — 99223 1ST HOSP IP/OBS HIGH 75: CPT | Performed by: OTHER

## 2025-03-13 PROCEDURE — 95816 EEG AWAKE AND DROWSY: CPT | Performed by: OTHER

## 2025-03-13 NOTE — PLAN OF CARE
NURSING ADMISSION NOTE      Patient admitted via Cart  Oriented to room.  Safety precautions initiated.  Bed in low position.  Call light in reach.      Assuming care at 2000, pt is alert and oriented x4,   No c/o of pain, on Tele : NSR, RA  PIV C/D/I infused LR at rate of 500 ml/hR completed  Vital signs statble at this time. QID Accucheck  Carb control on diet. No dysphagia noted. Take pill whole  Safety precaution is in place.   Call light is within the patient's reach.   Bed in lower position. Plan of care is ongoing.     Problem: Diabetes/Glucose Control  Goal: Glucose maintained within prescribed range  Description: INTERVENTIONS:- Monitor Blood Glucose as ordered- Assess for signs and symptoms of hyperglycemia and hypoglycemia- Administer ordered medications to maintain glucose within target range- Assess barriers to adequate nutritional intake and initiate nutrition consult as needed- Instruct patient on self management of diabetes  Outcome: Progressing

## 2025-03-13 NOTE — ED QUICK NOTES
Rounding Completed    Plan of Care reviewed. Waiting for  bed  Elimination needs assessed.  Provided  patient is on purewick    Bed is locked and in lowest position. Call light within reach.

## 2025-03-13 NOTE — DIETARY NOTE
[facility]   part of [facility]   CLINICAL NUTRITION    Yeny Rust     Admitting diagnosis:  Transient alteration of awareness [R40.4]  Hyperglycemia [R73.9]    Ht: 160 cm (5' 3\")  Wt: 55.7 kg (122 lb 12.7 oz).   Body mass index is 21.75 kg/m².  IBW: 52.3kg    Wt Readings from Last 6 Encounters:   03/12/25 55.7 kg (122 lb 12.7 oz)   01/08/25 54.4 kg (120 lb)   07/02/24 56.7 kg (125 lb)   06/27/24 46.5 kg (102 lb 9.6 oz)   06/25/24 46.7 kg (103 lb)   06/21/24 46.9 kg (103 lb 8 oz)        Labs/Meds reviewed    Diet:       Procedures    Carbohydrate controlled diet 2000 kcal/75 grams; Is Patient on Accuchecks? No     Percent Meals Eaten (last 3 days)       Date/Time Percent Meals Eaten (%)    03/13/25 1100 90 %              Pt chart reviewed d/t A1c 10.  Visited with pt, eating better. Both pt and spouse declined any need for diet education at the moment.   Patient reports good appetite at this time.  Nursing notes reports Percent Meals Eaten (%): 90 % intake for last meal.  Tolerating po diet without diarrhea, emesis, or constipation.   No significant weight changes noted.     Patient is at low nutrition risk at this time.    Please consult if patient status changes or nutrition issues arise.    Stacy Walker RD, LDN  Clinical Nutrition  [id number]

## 2025-03-13 NOTE — CONSULTS
[facility]  [facility] Neurology Consult Note    Yeny Rust Patient Status:  Observation    12/3/1945 MRN [id number]   Location [facility] 7NE-A Attending Von Farias, DO   Hosp Day # 0 PCP Joshua Haque MD     REASON FOR EVALUATION:  Transient alteration in awareness    HISTORY OF PRESENT ILLNESS:  Mrs. Rust is a 79-year-old woman with diabetes mellitus and coronary artery disease who was brought to the emergency department because of an episode of loss of awareness.  She is unable to provide much in the way of a clinical history because she does not remember what was happening leading up to this episode.  She remembers several hours before this and she remembers being in the emergency department but nothing in the home around the time of this event.  Her , at the bedside, is able to provide collateral history.  At approximately 6 PM they went into their kitchen for dinner and everything seemed to be okay.  He went to the refrigerator to get her something to eat and when he turned around she was sitting with her head slumped forward.  He went to try and wake her up but she was mumbling and her eyes were closed.  This lasted several minutes.  She seemed to be normal within several minutes.  In the meantime he had called 911 because nothing like this had ever happened before.  She had not had her insulin yesterday because she usually takes it around that time.  He did not have a sense that she was not eating or drinking normally that day.  She feels normal now.    PAST MEDICAL HISTORY:  Past Medical History:    Actinic keratosis    Basal cell carcinoma    Breast cancer (HCC)    Invasive Ductal Carcinoma    CAD (coronary artery disease)    Diabetes (HCC)    Essential hypertension    Hyperlipidemia    Hypothyroidism    Muscle weakness    Osteoarthritis    Personal history of antineoplastic chemotherapy    Rheumatoid arthritis involving multiple sites with positive rheumatoid factor (HCC)     Squamous cell carcinoma    Type 2 diabetes mellitus (HCC)       PAST SURGICAL HISTORY:  Past Surgical History:   Procedure Laterality Date    Cabg      Colonoscopy  6/2010    diverticulosis    Implant left  Nov. 2011    Reconstructive for mastectomy    Mastectomy left Left 9/21/10    Invasive Ductal CA    Other surgical history      basal cell removal    Removal of tunneled cva device, with subq port or pump, central or peripheral insertion  2/16/2012    Procedure: PORT-A-CATH REMOVAL;  Surgeon: Ozzy Ortiz MD;  Location: [facility], [facility]    Tonsillectomy         FAMILY HISTORY:  family history includes Breast Cancer (age of onset: 45) in her cousin; Breast Cancer (age of onset: 50) in her maternal aunt; Breast Cancer (age of onset: 65) in her mother; Cancer in her mother; Heart Disease in her father; Heart Disorder in her father.    SOCIAL HISTORY:   reports that she has never smoked. She has never used smokeless tobacco. She reports that she does not drink alcohol and does not use drugs.    ALLERGIES:  Allergies   Allergen Reactions    Demerol ANAPHYLAXIS       MEDICATIONS:  No current outpatient medications on file.     Current Facility-Administered Medications   Medication Dose Route Frequency    insulin aspart (NovoLOG) 100 Units/mL FlexPen 2-10 Units  2-10 Units Subcutaneous TID AC and HS    aspirin DR tab 81 mg  81 mg Oral Daily    atorvastatin (Lipitor) tab 40 mg  40 mg Oral Nightly    levothyroxine (Synthroid) tab 125 mcg  125 mcg Oral Daily @ 0700    metoprolol succinate ER (Toprol XL) 24 hr tab 50 mg  50 mg Oral Daily    mirtazapine (Remeron) tab 45 mg  45 mg Oral Nightly    enoxaparin (Lovenox) 40 MG/0.4ML SUBQ injection 40 mg  40 mg Subcutaneous Daily    acetaminophen (Tylenol Extra Strength) tab 500 mg  500 mg Oral Q4H PRN    ondansetron (Zofran) 4 MG/2ML injection 4 mg  4 mg Intravenous Q6H PRN    metoclopramide (Reglan) 5 mg/mL injection 5 mg  5 mg Intravenous Q8H PRN    glucose (Dex4) 15  GM/59ML oral liquid 15 g  15 g Oral Q15 Min PRN    Or    glucose (Glutose) 40% oral gel 15 g  15 g Oral Q15 Min PRN    Or    glucose-vitamin C (Dex-4) chewable tab 4 tablet  4 tablet Oral Q15 Min PRN    Or    dextrose 50% injection 50 mL  50 mL Intravenous Q15 Min PRN    Or    glucose (Dex4) 15 GM/59ML oral liquid 30 g  30 g Oral Q15 Min PRN    Or    glucose (Glutose) 40% oral gel 30 g  30 g Oral Q15 Min PRN    Or    glucose-vitamin C (Dex-4) chewable tab 8 tablet  8 tablet Oral Q15 Min PRN    insulin degludec (Tresiba) 100 units/mL flextouch 20 Units  20 Units Subcutaneous Nightly       REVIEW OF SYSTEMS:  A 10-point system was reviewed.  Pertinent positives and negatives are noted in HPI.      PHYSICAL EXAMINATION:  VITAL SIGNS: /74 (BP Location: Right arm)   Pulse 84   Temp 97.6 °F (36.4 °C) (Oral)   Resp 22   Ht 63\"   Wt 122 lb 12.7 oz (55.7 kg)   SpO2 100%   BMI 21.75 kg/m²   GENERAL:  Patient is a 79 year old female in no acute distress.  HEART:  Regular rate and rhythm.  SKIN: Warm, dry, no rashes  PSYCH: Normal mood, somewhat flat affect. Has a scab on her left cheek \"because she is a \" according to her     NEUROLOGICAL:   Mental status: Oriented to person, place, not time  Speech & Language: Fluent when spoken to but does not speak if not directly spoken to, no dysarthria  Comprehension: Intact  Cranial Nerves: VFF, PERRL, EOMI, no nystagmus, facial sensation intact, face symmetric, tongue midline, shoulder shrug equal  Motor: moves all limbs without asymmetry. Givens almost no effort against resistance.  Sensory: Intact to light touch in all limbs  Coordination: FTN intact  Tendon Reflexes: normal for habitus, no asymmetry  Gait: Deferred    DIAGNOSTIC DATA:  Labs:  Recent Labs   Lab 03/12/25  1425 03/13/25  0541   RBC 4.31 3.96   HGB 11.6* 10.8*   HCT 36.2 33.3*   MCV 84.0 84.1   MCH 26.9 27.3   MCHC 32.0 32.4   RDW 14.6 14.8   NEPRELIM 5.82 8.57*   WBC 9.9 14.3*   .0  440.0         Recent Labs   Lab 03/12/25  1425 03/13/25  0541   * 151*   BUN 12 10   CREATSERUM 1.00 0.80   EGFRCR 57* 75   CA 9.0 9.2   * 137   K 4.2 3.6   CL 92* 99   CO2 29.0 30.0         IMAGING:  CT BRAIN OR HEAD (CPT=70450)    Result Date: 3/12/2025  CONCLUSION:  Chronic changes in the brain, as described, but no acute intracranial bleed, or other acute intracranial process identified.   LOCATION:  [id number]   Dictated by (CST): Royal Weinberg MD on 3/12/2025 at 5:54 PM     Finalized by (CST): Royal Weinberg MD on 3/12/2025 at 5:57 PM             ASSESSMENT:  Mrs. Rust is a 79-year-old woman with uncontrolled diabetes mellitus who experienced a nonspecific episode of diminished awareness, possibly related to hyperglycemia.    PLAN:  Active Problems:    * No active hospital problems. *  MRI brain without contrast, MRA head/neck without contrast.    Await the results of routine EEG.    Orthostatic vital signs, heart rate and blood pressure.    Otherwise continue supportive care.  We are following.    C[place] Stewart MD

## 2025-03-13 NOTE — PROGRESS NOTES
[facility]   part of [facility] Hospitalist Progress Note     Yeny Rust Patient Status:  Observation    12/3/1945 MRN [id number]   Location [facility] 7NE-A Attending Von Farias,    Hosp Day # 0 PCP Joshua Haque MD     Assessment & Plan:    A/P: 78 y/o F w/ Pmhx of CAD s/p CABG, Htn, HLD, HypoT, T2DM, Breast/Basal Cell/Squamous Cell Ca Hx who presents to the hospital after being found altered in her home     Acute Metabolic Encephalopathy  Differential includes hypo/hyperglycemia, Seizures, TIA/CVA  CTH: noted  Plan:  - Neurology on consult, MRI/MRA pending  - EEG ordered and pending  - PT/OT evaluations     T2DM  - Tresiba 20, MDISS     Hx of CAD s/p CABG  HLD  - ASA/Statin     HypoT  - Synthroid    DVT Ppx: Lovenox    Subjective:     Patient seen and examined this morning at bedside. Doing well, back to baseline mental status. No focal deficits. EEG ongoing at bedside. No overnight complaints.    Vital signs:  Temp:  [97.6 °F (36.4 °C)-98.3 °F (36.8 °C)] 98 °F (36.7 °C)  Pulse:  [71-92] 92  Resp:  [13-24] 24  BP: (116-160)/(70-84) 130/71  SpO2:  [94 %-100 %] 97 %    Physical Exam:    General: No acute distress.   Respiratory: Clear to auscultation bilaterally. No wheezes.  Cardiovascular: S1, S2. Regular rate and rhythm  Abdomen: Soft, nontender, nondistended.  Positive bowel sounds. No rebound or guarding.  Extremities: No edema.  Neuro:  Grossly non focal, no motor deficits.      Diagnostic Data:    Pertinent Labs and Imaging independently reviewed  Comments:  WBC increase noted  HgB stable  Mag low    Von Farias D.O.  [facility] Hospitalist     Supplementary Documentation:   DVT Mechanical Prophylaxis:   SCDs,    DVT Pharmacologic Prophylaxis   Medication    enoxaparin (Lovenox) 40 MG/0.4ML SUBQ injection 40 mg         DVT Pharmacologic prophylaxis: Aspirin 81 mg      Code Status: Not on file  Lim: External urinary catheter in place  Lim  Duration (in days):   Central line:    SOLOMON: 3/13/2025        **Certification      PHYSICIAN Certification of Need for Inpatient Hospitalization - Initial Certification    Patient will require inpatient services that will reasonably be expected to span two midnight's based on the clinical documentation in H+P.   Based on patients current state of illness, I anticipate that, after discharge, patient will require TBD.

## 2025-03-13 NOTE — PROGRESS NOTES
03/13/25 1630 03/13/25 1631 03/13/25 1632   Vital Signs   Pulse 87 91 92   Heart Rate Source Monitor Monitor Monitor   Resp 17 19 24   Respiratory Quality Normal Normal Normal   /70 116/70 130/71   MAP (mmHg) 88 85 89   BP Location Right arm Right arm Right arm   BP Method Automatic Automatic Automatic   Patient Position Lying Sitting Standing

## 2025-03-14 ENCOUNTER — APPOINTMENT (OUTPATIENT)
Dept: MRI IMAGING | Facility: HOSPITAL | Age: 80
End: 2025-03-14
Attending: Other
Payer: MEDICARE

## 2025-03-14 LAB
ANION GAP SERPL CALC-SCNC: 11 MMOL/L (ref 0–18)
BASOPHILS # BLD AUTO: 0.12 X10(3) UL (ref 0–0.2)
BASOPHILS NFR BLD AUTO: 1 %
BUN BLD-MCNC: 9 MG/DL (ref 9–23)
CALCIUM BLD-MCNC: 9.2 MG/DL (ref 8.7–10.6)
CHLORIDE SERPL-SCNC: 102 MMOL/L (ref 98–112)
CO2 SERPL-SCNC: 24 MMOL/L (ref 21–32)
CREAT BLD-MCNC: 0.69 MG/DL
EGFRCR SERPLBLD CKD-EPI 2021: 88 ML/MIN/1.73M2 (ref 60–?)
EOSINOPHIL # BLD AUTO: 0.64 X10(3) UL (ref 0–0.7)
EOSINOPHIL NFR BLD AUTO: 5.2 %
ERYTHROCYTE [DISTWIDTH] IN BLOOD BY AUTOMATED COUNT: 15.1 %
EST. AVERAGE GLUCOSE BLD GHB EST-MCNC: 321 MG/DL (ref 68–126)
GLUCOSE BLD-MCNC: 108 MG/DL (ref 70–99)
GLUCOSE BLD-MCNC: 113 MG/DL (ref 70–99)
GLUCOSE BLD-MCNC: 211 MG/DL (ref 70–99)
GLUCOSE BLD-MCNC: 220 MG/DL (ref 70–99)
GLUCOSE BLD-MCNC: 251 MG/DL (ref 70–99)
HBA1C MFR BLD: 12.8 % (ref ?–5.7)
HCT VFR BLD AUTO: 34.6 %
HGB BLD-MCNC: 10.8 G/DL
IMM GRANULOCYTES # BLD AUTO: 0.07 X10(3) UL (ref 0–1)
IMM GRANULOCYTES NFR BLD: 0.6 %
LYMPHOCYTES # BLD AUTO: 4.35 X10(3) UL (ref 1–4)
LYMPHOCYTES NFR BLD AUTO: 35.3 %
MAGNESIUM SERPL-MCNC: 1.5 MG/DL (ref 1.6–2.6)
MCH RBC QN AUTO: 27 PG (ref 26–34)
MCHC RBC AUTO-ENTMCNC: 31.2 G/DL (ref 31–37)
MCV RBC AUTO: 86.5 FL
MONOCYTES # BLD AUTO: 1.05 X10(3) UL (ref 0.1–1)
MONOCYTES NFR BLD AUTO: 8.5 %
NEUTROPHILS # BLD AUTO: 6.1 X10 (3) UL (ref 1.5–7.7)
NEUTROPHILS # BLD AUTO: 6.1 X10(3) UL (ref 1.5–7.7)
NEUTROPHILS NFR BLD AUTO: 49.4 %
OSMOLALITY SERPL CALC.SUM OF ELEC: 283 MOSM/KG (ref 275–295)
PLATELET # BLD AUTO: 375 10(3)UL (ref 150–450)
POTASSIUM SERPL-SCNC: 4.2 MMOL/L (ref 3.5–5.1)
RBC # BLD AUTO: 4 X10(6)UL
SODIUM SERPL-SCNC: 137 MMOL/L (ref 136–145)
WBC # BLD AUTO: 12.3 X10(3) UL (ref 4–11)

## 2025-03-14 PROCEDURE — 70551 MRI BRAIN STEM W/O DYE: CPT | Performed by: OTHER

## 2025-03-14 PROCEDURE — 70544 MR ANGIOGRAPHY HEAD W/O DYE: CPT | Performed by: OTHER

## 2025-03-14 PROCEDURE — 99232 SBSQ HOSP IP/OBS MODERATE 35: CPT

## 2025-03-14 PROCEDURE — 70547 MR ANGIOGRAPHY NECK W/O DYE: CPT | Performed by: OTHER

## 2025-03-14 PROCEDURE — 99223 1ST HOSP IP/OBS HIGH 75: CPT | Performed by: CLINICAL NURSE SPECIALIST

## 2025-03-14 NOTE — PLAN OF CARE
Assumed care at 1930  A&Ox3, Intermittent confusion, RA, Tele-NSR  Denies any pain  Pending MRI screening and MRI to be done  Up 1 w/walker to bathroom  Patient updated on POC, all questions answered.

## 2025-03-14 NOTE — PROGRESS NOTES
[facility] Hospitalist Progress Note       SUBJECTIVE:  Pt awake and alert  No confusion this AM   at bedside  Denies fevers   Sugars elevated on admission, per pt thinks she is taking her insulin, A1c repeated this AM around 12     OBJECTIVE:  Scheduled Meds:    insulin aspart  2-10 Units Subcutaneous TID AC and HS    aspirin  81 mg Oral Daily    atorvastatin  40 mg Oral Nightly    levothyroxine  125 mcg Oral Daily @ 0700    metoprolol succinate ER  50 mg Oral Daily    mirtazapine  45 mg Oral Nightly    enoxaparin  40 mg Subcutaneous Daily    insulin degludec  20 Units Subcutaneous Nightly     Continuous Infusions:   PRN Meds:   acetaminophen    ondansetron    metoclopramide    glucose **OR** glucose **OR** glucose-vitamin C **OR** dextrose **OR** glucose **OR** glucose **OR** glucose-vitamin C    Vitals  Vitals:    03/14/25 0815   BP: 123/71   Pulse: 91   Resp: 19   Temp: 98.4 °F (36.9 °C)         Exam   Gen-    no acute distress, alert and oriented x 3   RESP-   Lungs CTA, normal respiratory effort  CV-      Heart RRR, no mgr  Abd-    soft, nondistended, nontender, bowel sounds present  Skin-    no rash  Neuro-  no focal neurologic deficits  Ext-      No edema in extremities   Psych- alert and oriented x 3     Labs:     Recent Labs   Lab 03/12/25  1425 03/13/25  0541 03/14/25  0546   WBC 9.9 14.3* 12.3*   HGB 11.6* 10.8* 10.8*   MCV 84.0 84.1 86.5   .0 440.0 375.0       Recent Labs   Lab 03/12/25  1425 03/13/25  0541 03/14/25  0546   * 137 137   K 4.2 3.6 4.2   CL 92* 99 102   CO2 29.0 30.0 24.0   BUN 12 10 9   CREATSERUM 1.00 0.80 0.69   CA 9.0 9.2 9.2   MG  --  1.5* 1.5*   * 151* 108*       Recent Labs   Lab 03/12/25  1425   ALT <7*   AST 10   ALB 4.1       Recent Labs   Lab 03/13/25  1230 03/13/25  1419 03/13/25  1806 03/13/25 2103 03/14/25  0538   PGLU 317* 371* 254* 231* 113*       AP:  78 y/o F w/ Pmhx of CAD s/p CABG, Htn, HLD, HypoT, T2DM, Breast/Basal Cell/Squamous Cell Ca Hx who  presents to the hospital after being found altered in her home     Acute Metabolic Encephalopathy  Differential includes hypo/hyperglycemia, Seizures, TIA/CVA  CTH: noted  Plan:  - Neurology on consult, MRI/MRA pending  - EEG no seizures   - PT/OT evaluations  - if neuro work up negative, suspecting from hyperglycemia, DM educaor consulted  - UA pending     T2DM  - Tresiba 20, MDISS  - may need to adjust home insulin given A1c of 12      Hx of CAD s/p CABG  HLD  - ASA/Statin     HypoT  - Synthroid     DVT Ppx: Lovenox            Betzaida Jerome MD

## 2025-03-14 NOTE — PROGRESS NOTES
[facility]  [facility] Neurology Progress Note    Yeny Rust Patient Status:  Observation    12/3/1945 MRN [id number]   Location [facility] 7NE-A Attending Betzaida Jerome MD   Hosp Day # 0 PCP Joshua Haque MD     CC: Transient alteration in awareness    Subjective:  Yeny Rust is a 79 years old female with PMHx significant for DM2, HTN, HLD, CAD, s/p CABG, OA, RA, breast cancer and hypothyroidism, who presented to the ED on 3/12 with and episode of loss of awareness. The episode is described by spouse as several minutes of not responding, mumbling and eyes were closed. Pt does not recall the event. Work up revealed elevated blood sugars in ED, as well as CT head with no acute pathology. Pt was admitted for further investigations, neurology was consulted.    Pt seen for a follow up visit today. In bed, awake, alert and oriented x 4. Eating breakfast.  Jassi is at bedside. Currently, the patient is comfortable, denies any pain, dizziness, diplopia, dysphagia, headache, numbness/tingling, no focal weakness. Orthostatic vistas were negative, EEG showed no epileptiform activity.         MEDICATIONS:  No current outpatient medications on file.     Current Facility-Administered Medications   Medication Dose Route Frequency    insulin aspart (NovoLOG) 100 Units/mL FlexPen 2-10 Units  2-10 Units Subcutaneous TID AC and HS    aspirin DR tab 81 mg  81 mg Oral Daily    atorvastatin (Lipitor) tab 40 mg  40 mg Oral Nightly    levothyroxine (Synthroid) tab 125 mcg  125 mcg Oral Daily @ 0700    metoprolol succinate ER (Toprol XL) 24 hr tab 50 mg  50 mg Oral Daily    mirtazapine (Remeron) tab 45 mg  45 mg Oral Nightly    enoxaparin (Lovenox) 40 MG/0.4ML SUBQ injection 40 mg  40 mg Subcutaneous Daily    acetaminophen (Tylenol Extra Strength) tab 500 mg  500 mg Oral Q4H PRN    ondansetron (Zofran) 4 MG/2ML injection 4 mg  4 mg Intravenous Q6H PRN    metoclopramide (Reglan) 5 mg/mL injection 5 mg  5 mg  Intravenous Q8H PRN    glucose (Dex4) 15 GM/59ML oral liquid 15 g  15 g Oral Q15 Min PRN    Or    glucose (Glutose) 40% oral gel 15 g  15 g Oral Q15 Min PRN    Or    glucose-vitamin C (Dex-4) chewable tab 4 tablet  4 tablet Oral Q15 Min PRN    Or    dextrose 50% injection 50 mL  50 mL Intravenous Q15 Min PRN    Or    glucose (Dex4) 15 GM/59ML oral liquid 30 g  30 g Oral Q15 Min PRN    Or    glucose (Glutose) 40% oral gel 30 g  30 g Oral Q15 Min PRN    Or    glucose-vitamin C (Dex-4) chewable tab 8 tablet  8 tablet Oral Q15 Min PRN    insulin degludec (Tresiba) 100 units/mL flextouch 20 Units  20 Units Subcutaneous Nightly       REVIEW OF SYSTEMS:  A 10-point system was reviewed.  Pertinent positives and negatives are noted in HPI.      PHYSICAL EXAMINATION:  VITAL SIGNS: /81 (BP Location: Right arm)   Pulse 79   Temp 97.5 °F (36.4 °C) (Oral)   Resp 13   Ht 63\"   Wt 122 lb 12.7 oz (55.7 kg)   SpO2 97%   BMI 21.75 kg/m²   GENERAL:  Patient is a 79 year old female in no acute distress.  HEENT:  Normocephalic, atraumatic  ABD: Soft, non tender  SKIN: Warm, dry, no rashes    NEUROLOGICAL:   Mental status: Oriented to person, place, situation and time   Speech: Fluent, no obvious aphasia or dysarthria  Memory and comprehension: Intact   Cranial Nerves: VFF, PERRL 3mm brisk, EOMI, no nystagmus, facial sensation intact, face symmetric, tongue midline, shoulder shrug equal, remainder CN intact  Motor: No drift, no focal arm or leg weakness, moves all extremities symmetrically, weak hand , 3-4/5 bilaterally, LEs 4/5 bilaterally  Sensory: Intact to light touch   Coordination: FTN intact  Gait: Deferred      Imaging/Diagnostics:  CT BRAIN OR HEAD (CPT=70450)    Result Date: 3/12/2025  CONCLUSION:  Chronic changes in the brain, as described, but no acute intracranial bleed, or other acute intracranial process identified.   LOCATION:  [id number]   Dictated by (CST): Royal Weinberg MD on 3/12/2025 at 5:54 PM      Finalized by (CST): Royal Weinberg MD on 3/12/2025 at 5:57 PM          Labs:  Recent Labs   Lab 03/12/25  1425 03/13/25  0541 03/14/25  0546   RBC 4.31 3.96 4.00   HGB 11.6* 10.8* 10.8*   HCT 36.2 33.3* 34.6*   MCV 84.0 84.1 86.5   MCH 26.9 27.3 27.0   MCHC 32.0 32.4 31.2   RDW 14.6 14.8 15.1   NEPRELIM 5.82 8.57* 6.10   WBC 9.9 14.3* 12.3*   .0 440.0 375.0         Recent Labs   Lab 03/12/25 1425 03/13/25  0541 03/14/25  0546   * 151* 108*   BUN 12 10 9   CREATSERUM 1.00 0.80 0.69   EGFRCR 57* 75 88   CA 9.0 9.2 9.2   * 137 137   K 4.2 3.6 4.2   CL 92* 99 102   CO2 29.0 30.0 24.0         EEG REPORT 3/12/2025:     Reason for Examination: Encephalopathy     Technical Summary:   18 Channels of EEG and 1 Channel of EKG was performed utilizing internation 10/20 method.          Background Activity:   The background activity consisted of 9-10 Hz waveforms, reactive to eye opening/ external stimulation.     Activation:     Hyperventilation:   Not Performed.     Photic Stimulation:  Driving response seen.No        Sleep:  Stage I sleep seen.      Impression:  This is an Abnormal EEG. No focal, lateralized or generalized epileptiform activity seen.               Pre-morbid mRS 1-2      Assessment and Plan:    A 79 years old female with:    Nonspecific episode of diminished awareness, possibly related to dehydration, not eating and hyperglycemia. Seizure as well as stroke/TIA are also in the differential, as pt has risk factors of age, CAD, HTN, HLD, DM. Work up:  CT head - no acute findings  MRI brain/MRA head and neck - pending  Routine EEG - no epileptiform activity  Orthostatic vital signs - negative  Labs: UA /urine cx - pending, leucocytosis noted. B12 and folate normal  Safety and falls precautions, PT/OT and rehab evals  Continue supportive care with sufficient hydration, nutrition and sleep  Uncontrolled diabetes - presented with hyperglycemia with  per ED records. A1c 12.8. Endocrinology  following.  Hx of CAD s/p CABG - continue ASA and statin  Discussed with pt and spouse, all questions answered. Verbalized understanding. Discussed with dr. Stewart, we will continue to follow pending above testing.   Is this a shared or split note between Advanced Practice Provider and Physician? Yes       ADDENDUM @ 1800: MRI/A studies are unremarkable, with nothing pathological to explain her clinical episode. She was likely briefly encephalopathic in the setting of persistent significant hyperglycemia and I do suspect underlying cognitive impairment. She can dismiss from the hospital at the discretion of her attending physician. She should follow up with neurology in 4-6 weeks for consideration of workup for cognitive impairment. Neurology will sign off but call back should concerns arise.    Bindu Beatty [facility]  3/14/2025, 9:38 AM   [place] # [id number]

## 2025-03-14 NOTE — PROCEDURES
EEG REPORT;    Reason for Examination: Encephalopathy    Technical Summary:   18 Channels of EEG and 1 Channel of EKG was performed utilizing internation 10/20 method.        Background Activity:   The background activity consisted of 9-10 Hz waveforms, reactive to eye opening/ external stimulation.    Activation:    Hyperventilation:   Not Performed.    Photic Stimulation:  Driving response seen.No       Sleep:  Stage I sleep seen.     Impression:  This is an Abnormal EEG. No focal, lateralized or generalized epileptiform activity seen.       Ayaz Camarena MD  [facility].

## 2025-03-14 NOTE — PLAN OF CARE
Assumed care at 0700. Pt A/O x4, can be forgetful. RA.  NSR on tele. VSS. Up x2 with the walker. MRI brain completed.  Pt updated on POC. Spoke with pt family and updated them over the phone. Pt resting comfortably. Call light within reach.

## 2025-03-14 NOTE — CONSULTS
[facility]  Diabetes Consult Note    Yeny Rust Patient Status:  Observation    12/3/1945 MRN [id number]   Location [facility] 7NE-A Attending Betzaida Jerome MD   Hosp Day # 0 PCP Joshua Haque MD     Reason for Consult:   Management recommendations in setting of uncontrolled T2DM      Provider Requesting Consult:  Dr. Jerome ([facility] hospitalist)      Diagnosis:  Patient Active Problem List   Diagnosis    History of breast cancer    Type 2 diabetes mellitus with microalbuminuria, with long-term current use of insulin (HCC)    CAD (coronary artery disease)    History of basal cell carcinoma    Rheumatoid arthritis involving multiple sites with positive rheumatoid factor (HCC)    Hypothyroidism, unspecified type    History of squamous cell carcinoma    Immunosuppression due to drug therapy (HCC)    Type 2 diabetes mellitus with hyperglycemia, with long-term current use of insulin (HCC)    Cerebellar atrophy    S/P left mastectomy    Sepsis due to undetermined organism (HCC)    Hyponatremia    Anemia    Hypokalemia    Acute weakness    Gait disturbance    Community acquired pneumonia of right upper lobe of lung    Hypoxia         Medical History:  Past Medical History:    Actinic keratosis    Basal cell carcinoma    Breast cancer (HCC)    Invasive Ductal Carcinoma    CAD (coronary artery disease)    Diabetes (HCC)    Essential hypertension    Hyperlipidemia    Hypothyroidism    Muscle weakness    Osteoarthritis    Personal history of antineoplastic chemotherapy    Rheumatoid arthritis involving multiple sites with positive rheumatoid factor (HCC)    Squamous cell carcinoma    Type 2 diabetes mellitus (HCC)     Past Surgical History:   Procedure Laterality Date    Cabg      Colonoscopy  2010    diverticulosis    Implant left  2011    Reconstructive for mastectomy    Mastectomy left Left 9/21/10    Invasive Ductal CA    Other surgical history      basal cell removal    Removal of tunneled cva  device, with subq port or pump, central or peripheral insertion  2/16/2012    Procedure: PORT-A-CATH REMOVAL;  Surgeon: Ozzy Ortiz MD;  Location: [facility]    Tonsillectomy       Family History   Problem Relation Age of Onset    Heart Disorder Father     Heart Disease Father     Cancer Mother         breast    Breast Cancer Mother 65        mom- dx age 60's    Breast Cancer Cousin 45        P 1st  cousin dx age 45    Breast Cancer Maternal Aunt 50        dx age 50's    Stroke Neg          Diabetes history:  Type:  T2DM  Onset: 66yo  Family history of DM: mother w/ T2DM      Allergies: Allergies[1]      Medications: Complete list reviewed. Active diabetes medications include degludec and aspart.  Diabetic Medications               metFORMIN 500 MG Oral Tab (Taking) Take 2 tablets (1,000 mg total) by mouth 2 (two) times daily with meals.    Insulin Glargine, 1 Unit Dial, (TOUJEO SOLOSTAR) 300 UNIT/ML Subcutaneous Solution Pen-injector Inject 20 Units into the skin every evening.     Patient taking differently: Inject 26 Units into the skin every evening. Pt only takes 10 units if not eating                Labs:  Recent Labs   Lab 03/13/25  1230 03/13/25  1419 03/13/25  1806 03/13/25  2103 03/14/25  0538   PGLU 317* 371* 254* 231* 113*     Recent Labs     03/12/25  1425 03/12/25  1523 03/13/25  0541 03/13/25  1230 03/14/25  0538 03/14/25  0546   *  --  137  --   --  137   CL 92*  --  99  --   --  102   CO2 29.0  --  30.0  --   --  24.0   BUN 12  --  10  --   --  9   CREATSERUM 1.00  --  0.80  --   --  0.69   PGLU  --    < >  --    < > 113*  --    CA 9.0  --  9.2  --   --  9.2   ALB 4.1  --   --   --   --   --     < > = values in this interval not displayed.                    History of Present Illness: Yeny Rust is a 79 year old female with a PMH of T2DM, HTN, HLD, hypothyroidism, breast/basal cell/squamous cell CA, and CAD s/p CABG admitted 3/12/2025 from home with complaints of  hyperglycemia ( per EMS) and confusion. ED evaluation revealed continued hyperglycemia, as well as acute metabolic encephalopathy.         Assessment/Recommendations:  Upon interview today, patients states she was diagnosed with T2DM at age 60 and has followed with her PCP for management since that time. Patient and  at bedside report 100% compliance with home metformin and basal insulin administration; however, neither could reiterate which type of insulin or dose they were giving. Explained to patient and  need for awareness regarding medication types and dosages; suggested writing on piece of paper and carrying in both of their wallets for easy retrieval. Further explained that anti-diabetic medication compliance necessary to avoid confusion episode that cause admission (BG >400 via EMS). Patient and  state understanding of insulin pen usage and that she has new Dexcom CGM prescription at home; recommended patient and  to start CGM wear once home in order to trend BG values at home. Recommended patient and  increase basal insulin dose by 1-2u every other day until fasted AM glucose <140. Finally, recommended close follow-up with PCP for continued T2DM management; may be eligible for ozempic or mounjaro as weekly injectable option. Patient states understanding of and willingness to participate in plan of care.       Plan:  Inpatient recommendations -   Degludec = recommend initiating at 20u every day (similar to patient's home dose)  Aspart = given discontinuation of oral anti-diabetics on admission, recommend initiating at medium dose sliding scale  Discharge recommendations -   Restart home oral anti-diabetics:  Glargine = 20u every day  Patient educated to increase glargine dose by 1-2u every other day if fasted BG >140   Metformin = 1000mg BID      Prescription Recommendations:  Medicare:  Insulin:   Novolog, Fiasp, Apidra, Admelog, Levemir, Lantus, Basaglar, Tresiba,  Toujeo  (If no secondary insurance, may need prior auth)  Supplies:  BD pen needles (Maranda)  Glucometer:  OneTouch      Provider follow up recommendations:  PCP - Dr. Haque ([place])  Endocrinology/Diabetes Center - patient's  prefers to stay with PCP for T2DM management at this time      A total of 60 minutes were spent with the patient, 100% was spent counseling and coordinating care for T2 diabetes self-management including nutrition, exercise, blood glucose monitoring, insulin administration, medications, treatment options, follow-up coordination and resources.    Joseline Tobar, APRN  3/14/2025  10:00 AM         [1]   Allergies  Allergen Reactions    Demerol ANAPHYLAXIS

## 2025-03-15 VITALS
DIASTOLIC BLOOD PRESSURE: 63 MMHG | OXYGEN SATURATION: 96 % | HEIGHT: 63 IN | RESPIRATION RATE: 17 BRPM | HEART RATE: 88 BPM | WEIGHT: 122.81 LBS | TEMPERATURE: 99 F | BODY MASS INDEX: 21.76 KG/M2 | SYSTOLIC BLOOD PRESSURE: 96 MMHG

## 2025-03-15 LAB
GLUCOSE BLD-MCNC: 194 MG/DL (ref 70–99)
GLUCOSE BLD-MCNC: 78 MG/DL (ref 70–99)

## 2025-03-15 RX ORDER — INSULIN GLARGINE 300 U/ML
26 INJECTION, SOLUTION SUBCUTANEOUS EVERY EVENING
Status: SHIPPED | COMMUNITY
Start: 2025-03-15

## 2025-03-15 NOTE — CM/SW NOTE
Spoke with daughter Tianna to discuss discharge planning. She is driving and will call SW back when she is free today.    Anticipated therapy need: Home with Home Healthcare  Sent referral for HH on [facility]. Patient has a history with [facility].     SW/CM to remain available for support and/or discharge planning.    Addendum 2pm: Patient/ informed nurse of preference to use [facility]. Sent referral on [facility] and spoke with representative, they are able to accept her. Reserved on [facility].       LYNNETTE Santiago  Discharge Planner

## 2025-03-15 NOTE — DISCHARGE SUMMARY
[facility] Hospitalist Discharge Summary    Patient ID  Yeny Rust  [id number]  79 year old  12/3/1945  Admit date: 3/12/2025  Discharge date: No discharge date for patient encounter. ***  Attending: Betzaida Jerome MD   Primary Care Physician: Joshua Haque MD ***  Reason for admission: *** (see HPI on HP for further detail)  Discharge condition: {condition:01040}  Disposition: {disposition:84170}    Important follow up:  -PCP within 7*** 14*** days or other ***  -specialists: ***    -labs: ***  -radiology: ***    Additional patient instructions  ***n/a    Discharge med list     Medication List        CHANGE how you take these medications      Toujeo SoloStar 300 UNIT/ML Sopn  Generic drug: Insulin Glargine (1 Unit Dial)  What changed: how much to take            CONTINUE taking these medications      acetaminophen 325 MG Tabs  Commonly known as: Tylenol     aspirin 81 MG Tbec  Take 1 tablet (81 mg total) by mouth daily.     hydroCHLOROthiazide 12.5 MG Caps     levothyroxine 125 MCG Tabs  Commonly known as: Synthroid     Lipitor 40 MG Tabs  Generic drug: atorvastatin     metFORMIN 500 MG Tabs  Commonly known as: Glucophage     metoprolol succinate ER 50 MG Tb24  Commonly known as: Toprol XL     mirtazapine 45 MG Tabs  Commonly known as: Remeron     mupirocin 2 % Oint  Commonly known as: Bactroban  Apply 1 Application topically daily.     traMADol 50 MG Tabs  Commonly known as: Ultram            STOP taking these medications      amoxicillin clavulanate 875-125 MG Tabs  Commonly known as: Augmentin              Discharge Diagnoses/Hospital course:  ***    Consults:  IP CONSULT TO HOSPITALIST  IP CONSULT TO NEUROLOGY  IP CONSULT TO SOCIAL WORK  IP CONSULT TO DIABETES APRN/PA    Radiology:  MRI BRAIN MRA BRAIN MRA NECK (CPT=70551/30153/47782)    Result Date: 3/14/2025  PROCEDURE:  MRI BRAIN, MRA BRAIN&NECK (CPT=70551/39535/97275)  COMPARISON:  [facility] , CT, CT BRAIN OR HEAD ([id number]), 3/12/2025, 5:26 PM.  INDICATIONS:   Altered mental status  TECHNIQUE:  MRI of the brain was performed with multi-planar T1, T2-weighted images with FLAIR sequences and diffusion weighted images without infusion.  MR angiography of the brain without infusion and MR angiography of the neck without infusion was performed using 3D time of flight, multi-planar and 3D reconstructed images. All measurements obtained in this exam were performed using NASCET criteria.  PATIENT STATED HISTORY: (As transcribed by Technologist)  Possible stroke   FINDINGS:  Moderate patient motion noted.  Moderate global brain parenchymal volume loss without overt hydrocephalus.  There is no midline shift or mass effect.  The basal cisterns are patent.  The gray-white matter differentiation is intact.  The craniocervical junction is unremarkable.   Minimal chronic microvascular ischemic changes in the cerebral white matter.  There is no acute intracranial hemorrhage or extra-axial fluid collection identified.  No significant abnormal parenchymal gradient susceptibility. There is no restricted diffusion to suggest acute ischemia/infarction.  Mild ethmoid mucosal thickening.  Bilateral maxillary retention cysts.  Minimal fluid in the mastoid air cells.  mastoid air cells are unremarkable.   The upper cervical, petrous, cavernous, and supraclinoid internal carotid arteries are unremarkable.  An anterior communicating artery is seen.  The left anterior cerebral artery is dominant.  The remainder of the branches of the anterior cerebral and middle cerebral arteries are unremarkable.  A small right posterior communicating artery is seen along with a large left posterior communicating artery.  Hypoplastic left P1 segment.  The remainder of the branches of the posterior cerebral and superior cerebellar arteries are unremarkable.  The basilar artery has a normal course and caliber.  The left vertebral artery is mildly dominant.  The origins of the bilateral PICA are seen.  Suboptimal MRA  of the neck due to patient motion and noncontrast technique.  There is a bulb line aortic arch.  The bilateral subclavian arteries and innominate artery are grossly unremarkable.  The common carotid arteries are widely patent.  Mild-to-moderate atherosclerotic plaque in the right carotid bulb.  Mild atherosclerotic plaque in left carotid bulb.  There is no evidence of hemodynamically significant stenosis in the carotid bulbs by NASCET criteria.  The cervical internal carotid arteries are grossly patent.  The vertebral arteries originate from the subclavian arteries.  The cervical vertebral arteries are grossly patent.  The left vertebral artery is mildly dominant.             CONCLUSION:   1. No acute intracranial abnormality identified within the limitations of the exam.  2. Minimal chronic microvascular ischemic changes in the cerebral white matter.  3. No evidence of intracranial aneurysm, flow-limiting stenosis, or focal arterial occlusion.  4. No evidence of occlusion, dissection, or flow-limiting stenosis in the cervical vertebral or carotid arteries by noncontrast MRA of the neck. No evidence of hemodynamically significant carotid stenosis by NASCET criteria.  Please see above for further details.  LOCATION:  [id number]   Dictated by (CST): Christian Martinez MD on 3/14/2025 at 4:28 PM     Finalized by (CST): Christian Martinez MD on 3/14/2025 at 4:34 PM       CT BRAIN OR HEAD (CPT=70450)    Result Date: 3/12/2025  PROCEDURE:  CT BRAIN OR HEAD ([id number])  COMPARISON:  [facility] CT, CT BRAIN OR HEAD ([id number]), 7/02/2024, 4:45 AM.  INDICATIONS:  AMS altered mental status  TECHNIQUE:  Noncontrast CT scanning is performed through the brain. Dose reduction techniques were used. Dose information is transmitted to the ACR (American College of Radiology) NRDR (National Radiology Data Registry) which includes the Dose Index Registry.  PATIENT STATED HISTORY: (As transcribed by Technologist)  Patient presents with altered mental  status.    FINDINGS:   VENTRICLES/SULCI:   Ventricles and sulci are prominent indicating atrophy.  INTRACRANIAL:  There are no abnormal extraaxial fluid collections.  There is no midline shift.  There are no acute appearing intraparenchymal brain abnormalities.  There is nothing specific for acute territorial infarct.  There is no hemorrhage or mass lesion.  There is chronic microvascular ischemic white matter disease in the cerebrum bilaterally.  SINUSES:  No acute sinusitis.   MASTOIDS:  The mastoids are clear.  SKULL:  No evidence for fracture or acute osseous abnormality.  OTHER:  None.            CONCLUSION:  Chronic changes in the brain, as described, but no acute intracranial bleed, or other acute intracranial process identified.   LOCATION:  [id number]   Dictated by (CST): Royal Weinberg MD on 3/12/2025 at 5:54 PM     Finalized by (CST): Royal Weinberg MD on 3/12/2025 at 5:57 PM         Operative reports:          Day of discharge exam:  Vitals:    03/15/25 0800   BP: 142/73   Pulse: 80   Resp: 11   Temp: 97.6 °F (36.4 °C)     No acute distress, alert and oriented***  Lungs clear  Heart regular  Abdomen benign     Patient and/or family had opportunity to ask questions and expressed understanding and agreement with therapeutic plan as outlined      >30 minutes spent on discharge    Betzaida Jerome MD

## 2025-03-15 NOTE — OCCUPATIONAL THERAPY NOTE
OCCUPATIONAL THERAPY EVALUATION - INPATIENT     Room Number: 7625/7625-A  Evaluation Date: 3/15/2025  Type of Evaluation: Initial  Presenting Problem: hyperglycemia, metabolic encephalopathy    Physician Order: IP Consult to Occupational Therapy  Reason for Therapy: ADL/IADL Dysfunction and Discharge Planning    OCCUPATIONAL THERAPY ASSESSMENT   Baseline function: SBA ambulation with rollator, assist LB dressing, independent toileting and UB ADL. Assist shower transfers.  Current function: CGA ambulation, min (A) transfers, mod-max LB ADL  Below baseline with ADL/transfers.   Deficits: pain, L hand , activity tolerance, deconditioning, decreased self-efficacy  Continue OT in hospital.    Patient will benefit from continued skilled OT Services at discharge to promote prior level of function and safety with additional support and return home with home health OT    OT Device Recommendations: 3-in-1 commode    History: Patient is a 79 year old female admitted on 3/12/2025 with Presenting Problem: hyperglycemia, metabolic encephalopathy. Co-Morbidities : breast CA, HTN, DM2    WEIGHT BEARING RESTRICTION                   Recommendations for nursing staff:   Transfers: CGA with RW  Toileting location: toilet    EVALUATION SESSION:  ACTIVITY TOLERANCE: vitals wfl    COGNITION  Following Commands:  follows one step commands consistently  Increased processing time    UPPER EXTREMITY  ROM: within functional limits except for the following:  L shoulder 1/2 range, L hand IPs decreased flexion  Strength: within functional limits except for the following;  RUE wfl, LUE NT due to pain    EDUCATION PROVIDED  Patient Education : Role of Occupational Therapy; Plan of Care; Discharge Recommendations; DME Recommendations; Functional Transfer Techniques; Fall Prevention  Patient's Response to Education: Verbalized Understanding    PATIENT START OF SESSION: semi supine, spouse present  FUNCTIONAL TRANSFER ASSESSMENT  Sit to Stand:  Edge of Bed  Edge of Bed: Contact Guard Assist  Toilet Transfer: Contact Guard Assist    BED MOBILITY  Supine to Sit : Minimal Assist    BALANCE ASSESSMENT     FUNCTIONAL ADL ASSESSMENT  Grooming Standing: Contact Guard Assist  Toileting Seated: Stand-by Assist (anterior abraham care)  Toileting Standing: Moderate Assist (underpants)      EQUIPMENT USED: RW, commode over otilet  Demonstrates functional use, Would benefit from additional trial      THERAPIST COMMENTS: ADL/mobility as noted. Ambulates CGA into bathroom with RW. Encouragement needed to attempt all activities, states she cannot accomplish task but demonstrates ability to engage.     Patient End of Session: Up in chair, Needs met, Call light within reach, RN aware of session/findings, All patient questions and concerns addressed, Hospital anti-slip socks, Alarm set, Family present    OCCUPATIONAL PROFILE    HOME SITUATION  Type of Home: House  Home Layout: Two level, Able to live on main level  Lives With: Spouse    Toilet and Equipment: Standard height toilet, 3-in-1 commode, Toilet riser with arms  Shower/Tub and Equipment: Walk-in shower, Grab bar, Shower chair       Occupation/Status: Retired K teacher  Hand Dominance: Right  Drives: No  Patient Regularly Uses: Rolling walker    Prior Level of Function: Pt and spouse both fluctuate in their reports of pt's PLOF. She has assist for shower transfers, then bathes self. He is able to assist with ADL prn. She typically uses the toilet on her own. Pt reports her knee pain is acute, spouse reports she will wake up screaming from pain at home and he sometimes helps her with ADL due to pain. Pt states she is independent with ADL. Family [place] out of state, may have set up a part time caregiver? Spouse bumps pt up the stairs to enter in a wheelchair. She has had to stay on the first level for \"awhile\" but they also state that her symptoms are acute.     SUBJECTIVE   Pt states she uses her whistle necklace to call  her spouse for help when she needs it.    PAIN ASSESSMENT  Ratin  Location: B knees, L hand  Management Techniques: Repositioning, Nurse notified    OBJECTIVE  Precautions: Bed/chair alarm  Fall Risk: High fall risk      ASSESSMENTS    AM-PAC ‘6-Clicks’ Inpatient Daily Activity Short Form  -   Putting on and taking off regular lower body clothing?: A Lot  -   Bathing (including washing, rinsing, drying)?: A Lot  -   Toileting, which includes using toilet, bedpan or urinal? : A Lot  -   Putting on and taking off regular upper body clothing?: A Little  -   Taking care of personal grooming such as brushing teeth?: A Little  -   Eating meals?: A Little    AM-PAC Score:  Score: 15  Approx Degree of Impairment: 56.46%  Standardized Score (AM-PAC Scale): 34.69    ADDITIONAL TESTS     NEUROLOGICAL FINDINGS      COGNITION ASSESSMENTS       PLAN  OT Treatment Plan: Balance activities, ADL training, Functional transfer training, UE strengthening/ROM, Patient/Family education, Patient/Family training, Compensatory technique education, Equipment eval/education  Rehab Potential : Fair  Frequency: 3-5x/week  Number of Visits to Meet Established Goals: 3    ADL Goals   Patient will perform grooming: with stand by assist  Patient will perform toileting: with stand by assist    Functional Transfer Goals  Patient will transfer to toilet:  with stand by assist    UE Exercise Program Goal  Patient will be supervision with bilateral gentle AROM HEP (home exercise program).      Patient Evaluation Complexity Level:   Occupational Profile/Medical History LOW - Brief history including review of medical or therapy records    Specific performance deficits impacting engagement in ADL/IADL LOW  1 - 3 performance deficits    Client Assessment/Performance Deficits LOW - No comorbidities nor modifications of tasks    Clinical Decision Making LOW - Analysis of occupational profile, problem-focused assessments, limited treatment options     Overall Complexity LOW     OT Session Time:  25 minutes  Self-Care Home Management: 15 minutes  Therapeutic Activity:  minutes  Neuromuscular Re-education:  minutes  Therapeutic Exercise:  minutes  Orthotic Management and Training:  minutes

## 2025-03-15 NOTE — DISCHARGE INSTRUCTIONS
Please take glargine 26 units and if you dont eat take 20 units, if your sugars in the AM are >140 increase your glargine by 2 units     Continue your metformin 1000mg BID             [facility]  Phone: [phone]  Fax: [phone]

## 2025-03-15 NOTE — PLAN OF CARE
Pt discharged home via wheelchair accompanied by her . Discharge instructions given with pt and jusband verbalizing understanding. Pt will resume home health care with [facility] and follow up with PCP and neurology.

## 2025-03-15 NOTE — PLAN OF CARE
Assumed care at 1930  A&Ox3-4, RA, Tele- VSS  Denies any pain  Up 1 w/walker to commode  Patient updated on POC, all questions answered.

## 2025-03-15 NOTE — PHYSICAL THERAPY NOTE
PHYSICAL THERAPY EVALUATION - INPATIENT     Room Number: 7625/7625-A  Evaluation Date: 3/15/2025  Type of Evaluation: Initial  Physician Order: PT Eval and Treat    Presenting Problem: AMS, hypoglycemia  Co-Morbidities : Breast CA, HTN, DM2, RA  Reason for Therapy: Mobility Dysfunction and Discharge Planning    PHYSICAL THERAPY ASSESSMENT   Patient is a 79 year old female admitted 3/12/2025 for AMS.  Prior to admission, patient's baseline is sba to occasional min assist with use of walker and assist from spouse as needed.  Patient is currently functioning below baseline with bed mobility, transfers, and gait.  Patient is requiring contact guard assist as a result of the following impairments: decreased functional strength, decreased endurance/aerobic capacity, pain, impaired dynamic standing balance, decreased muscular endurance, and medical status.  Physical Therapy will continue to follow for duration of hospitalization.    Patient will benefit from continued skilled PT Services at discharge to promote prior level of function and safety with additional support and return home with home health PT.    PLAN DURING HOSPITALIZATION  Nursing Mobility Recommendation : 1 Assist  PT Device Recommendation: Rolling walker, Gait belt  PT Treatment Plan: Bed mobility, Endurance, Energy conservation, Patient education, Gait training, Transfer training, Balance training, Strengthening, Range of motion, Stoop training  Rehab Potential : Good  Frequency (Obs): 3-5x/week     CURRENT GOALS    Goal #1 Patient is able to demonstrate supine - sit EOB @ level: modified independent     Goal #2 Patient is able to demonstrate transfers EOB to/from Chair/Wheelchair at assistance level: supervision     Goal #3 Patient is able to ambulate 50 feet with assist device: walker - rolling at assistance level: supervision     Goal #4 Pt will negotiate one step stoop with min assist   Goal #5    Goal #6    Goal Comments: Goals established on  3/15/2025      PHYSICAL THERAPY MEDICAL/SOCIAL HISTORY  History related to current admission: Patient is a 79 year old female admitted on 3/12/2025 from home for AMS.  Pt diagnosed with hypoglycemia.    HOME SITUATION  Type of Home: House  Home Layout: Two level, Able to live on main level  Stairs to Enter : 1 (typically bumps up in w/c)        Stairs to Bedroom: 10    Railing: Yes    Lives With: Spouse    Drives: No   Patient Regularly Uses: Rolling walker      Prior Level of Bib[place]: Pt reports typically supervision level and spouse will assist as needed. He helps her up the stairs to bedroom with use of gait belt. She uses rollator regularly and w/c if needed. Pt denies recent history of falls. Pt spouse present and confirms he assists as needed, dress lower body, assists in/out of shower. Pt wears whistle necklace to alert spouse when she needs help.     SUBJECTIVE  \"I can't\" when asked to move to EOB. Able to with encouragement.    OBJECTIVE  Precautions: Bed/chair alarm  Fall Risk: High fall risk    WEIGHT BEARING RESTRICTION     PAIN ASSESSMENT  Ratin  Location: B knees  Management Techniques: Activity promotion, Repositioning    COGNITION  Overall Cognitive Status:  WFL - within functional limits    RANGE OF MOTION AND STRENGTH ASSESSMENT  Upper extremity ROM and strength: see OT note, limited  strength and decreased shoulder ROM  L>R    Lower extremity ROM is within functional limits except for the following:  limited B Knee ROM due to pain, grossly to 90deg    Lower extremity strength is grossly 4/5 throughout    BALANCE  Static Sitting: Good  Dynamic Sitting: Not tested  Static Standing: Fair -  Dynamic Standing: Fair -      ACTIVITY TOLERANCE: pt with slow gait pattern and movement, no SOB, dizziness noted. Requires extra time due to slow movement pattern.                            AM-PAC '6-Clicks' INPATIENT SHORT FORM - BASIC MOBILITY  How much difficulty does the patient currently  have...  Patient Difficulty: Turning over in bed (including adjusting bedclothes, sheets and blankets)?: A Little   Patient Difficulty: Sitting down on and standing up from a chair with arms (e.g., wheelchair, bedside commode, etc.): A Little   Patient Difficulty: Moving from lying on back to sitting on the side of the bed?: A Little   How much help from another person does the patient currently need...   Help from Another: Moving to and from a bed to a chair (including a wheelchair)?: A Little   Help from Another: Need to walk in hospital room?: A Little   Help from Another: Climbing 3-5 steps with a railing?: A Lot     AM-PAC Score:  Raw Score: 17   Approx Degree of Impairment: 50.57%   Standardized Score (AM-PAC Scale): 42.13   CMS Modifier (G-Code): CK    FUNCTIONAL ABILITY STATUS  Gait Assessment   Functional Mobility/Gait Assessment  Gait Assistance: Contact guard assist  Distance (ft): 10 x 2  Assistive Device: Rolling walker  Pattern: Shuffle (stooped posture, R LE Externally rotated, flat foot/toes strike)    Skilled Therapy Provided     Bed Mobility:  Rolling: mod I with use of raling  Supine to sit: min assist for trunk support, able to scoot to EOB once seated   Sit to supine: NT     Transfer Mobility:  Sit to stand: cga/sba   Stand to sit: cga/sba  Gait = cga, shuffle    Therapist's Comments: Pt presents semi-reclined in bed, agreeable to PT with encouragement. Spouse present. RN approval for session noted. Mobility as above. Pt  cued for walker mgmt and sequencing for transfers. Pt completes toileting with sba, able to stand at sink to wash hands with cueing for walker mgmt in bathroom. Pt declines further ambulation citing knee pain and requests return to chair. Pt seated in chair with cga. Discussed level of care with spouse. Spouse feels able to assist at home. Pt requires extra time due to slow movement. Spouse reports dtr is setting up home health caregiver services, recommend HHPT as well.  Reports  supportive neighbor that typically assists pt into home. Instructed to stay on main level of home until HHPT can evaluate stair negotiation. Pt and spouse agreeable. RN aware of session and recs, SW notified.     Exercise/Education Provided:  Bed mobility  Functional activity tolerated  Gait training  Posture  Transfer training    Patient End of Session: Up in chair, With  staff, Needs met, Call light within reach, RN aware of session/findings, All patient questions and concerns addressed, Hospital anti-slip socks, Alarm set, Family present, Discussed recommendations with /      Patient Evaluation Complexity Level:  History Moderate - 1 or 2 personal factors and/or co-morbidities   Examination of body systems Low -  addressing 1-2 elements   Clinical Presentation Low- Stable   Clinical Decision Making Low Complexity       PT Session Time: 30 minutes  Gait Training: 10 minutes  Therapeutic Activity: 10 minutes

## 2025-03-17 ENCOUNTER — PATIENT OUTREACH (OUTPATIENT)
Dept: CASE MANAGEMENT | Age: 80
End: 2025-03-17

## 2025-03-17 NOTE — PROGRESS NOTES
Hospital follow up.    Last A1C Value: 12.8% Date: [3/14/2025]    Joshua Haque MD  [facility], Internal Medicine  [address] [place] [address] [place] [address] [phone]  Patient will contact the office to schedule.    Confirmed with patient's daughter.    Closing encounter.

## 2025-03-18 NOTE — PAYOR COMM NOTE
--------------  ADMISSION REVIEW     Payor: [employer]  Subscriber #:  [id number]  Authorization Number: [id number]    Admit date: N/A  Admit time: N/A     03/12/25 2144  Place in observation Once  Once     Completed     Service: Medical  Level of Care: Acute  Patient Class: Observation   Question: Diagnosis Answer: Transient alteration of awareness    03/12/25 2143 03/12/25 2143  Place in observation Once  Once     Completed     Service: Medical  Level of Care: Acute  Patient Class: Observation   Question: Diagnosis Answer: Transient alteration of awareness    03/12/25 214           REVIEW DOCUMENTATION:    Patient Seen in: [facility] Emergency Department    History     Chief Complaint   Patient presents with    Hyperglycemia     Stated Complaint:     Subjective:   HPI      79-year-old female with history of insulin-dependent diabetes brought in by paramedics after she was found to be altered and have hyperglycemia.   reports that she did not eat much today so he did not give her any of her insulin today.  He says she seems better at this time.  He reports that when he checked on her her head was slumped down and she was drooling and he answer some questions and her speech was very mumbled.  She has no recollection of any of this.  He did not notice a facial droop at this time.  He called 911 immediately because he thought she could be having a stroke.  He says that by the time the paramedics were loading her into the ambulance she seemed to be speaking more clearly.  In discussing the days events with her she does not remember eating the soup he prepared her for lunch.  She does not remember any events today prior to being in the ambulance.      Objective:     Past Medical History:    Actinic keratosis    Basal cell carcinoma    Breast cancer (HCC)    Invasive Ductal Carcinoma    CAD (coronary artery disease)    Diabetes (HCC)    Essential hypertension    Hyperlipidemia     Hypothyroidism    Muscle weakness    Osteoarthritis    Personal history of antineoplastic chemotherapy    Rheumatoid arthritis involving multiple sites with positive rheumatoid factor (HCC)    Squamous cell carcinoma    Type 2 diabetes mellitus (HCC)     Past Surgical History:   Procedure Laterality Date    Cabg      Colonoscopy  6/2010    diverticulosis    Implant left  Nov. 2011    Reconstructive for mastectomy    Mastectomy left Left 9/21/10    Invasive Ductal CA    Other surgical history      basal cell removal    Removal of tunneled cva device, with subq port or pump, central or peripheral insertion  2/16/2012    Procedure: PORT-A-CATH REMOVAL;  Surgeon: Ozzy Ortiz MD;  Location: [facility]    Tonsillectomy       Social History     Socioeconomic History    Marital status:    Tobacco Use    Smoking status: Never    Smokeless tobacco: Never   Vaping Use    Vaping status: Never Used   Substance and Sexual Activity    Alcohol use: No    Drug use: No     Social Drivers of Health     Food Insecurity: No Food Insecurity (3/12/2025)    NCSS - Food Insecurity     Worried About Running Out of Food in the Last Year: No     Ran Out of Food in the Last Year: No   Transportation Needs: No Transportation Needs (3/12/2025)    NCSS - Transportation     Lack of Transportation: No   Housing Stability: Not At Risk (3/12/2025)    NCSS - Housing/Utilities     Has Housing: Yes     Worried About Losing Housing: No     Unable to Get Utilities: No       Physical Exam     ED Triage Vitals   BP 03/12/25 1412 (!) 190/84   Pulse 03/12/25 1412 84   Resp 03/12/25 1412 16   Temp 03/12/25 1425 97.6 °F (36.4 °C)   Temp src 03/12/25 1425 Temporal   SpO2 03/12/25 1412 100 %   O2 Device 03/12/25 1412 None (Room air)       Current Vitals:   Vital Signs  BP: 137/70  Pulse: 71  Resp: 16  Temp: 97.7 °F (36.5 °C)  Temp src: Oral  MAP (mmHg): 91    Oxygen Therapy  SpO2: 100 %  O2 Device: None (Room air)  Pulse Oximetry Type:  Continuous  Oximetry Probe Site Changed: No  Pulse Ox Probe Location: Right hand      Physical Exam  General:  Vitals as listed.  No acute distress   HEENT: Sclerae anicteric.  Conjunctivae show no pallor.  Oropharynx clear, mucous membranes moist   Neck: supple, no rigidity   Lungs: good air exchange and clear   Heart: regular rate rhythm and no murmur   Abdomen: Soft and nontender.  No abdominal masses.  No peritoneal signs   Extremities: no edema, normal peripheral pulses   Neuro: Alert oriented and nonfocal   Skin: no rashes or nodules      CT BRAIN OR HEAD (CPT=70450)    Result Date: 3/12/2025  PROCEDURE:  CT BRAIN OR HEAD (45536)  COMPARISON:  [facility] , CT, CT BRAIN OR HEAD (18021), 7/02/2024, 4:45 AM.  INDICATIONS:  AMS altered mental status  TECHNIQUE:  Noncontrast CT scanning is performed through the brain. Dose reduction techniques were used. Dose information is transmitted to the ACR (American College of Radiology) NRDR (National Radiology Data Registry) which includes the Dose Index Registry.  PATIENT STATED HISTORY: (As transcribed by Technologist)  Patient presents with altered mental status.    FINDINGS:   VENTRICLES/SULCI:   Ventricles and sulci are prominent indicating atrophy.  INTRACRANIAL:  There are no abnormal extraaxial fluid collections.  There is no midline shift.  There are no acute appearing intraparenchymal brain abnormalities.  There is nothing specific for acute territorial infarct.  There is no hemorrhage or mass lesion.  There is chronic microvascular ischemic white matter disease in the cerebrum bilaterally.  SINUSES:  No acute sinusitis.   MASTOIDS:  The mastoids are clear.  SKULL:  No evidence for fracture or acute osseous abnormality.  OTHER:  None.            CONCLUSION:  Chronic changes in the brain, as described, but no acute intracranial bleed, or other acute intracranial process identified.   LOCATION:  [id number]   Dictated by (CST): Royal Weinberg MD on 3/12/2025 at 5:54 PM      Finalized by (CST): Royal Weinberg MD on 3/12/2025 at 5:57 PM          MDM      79-year-old female brought in by EMS after an episode of altered mental status at home.   describes mumbled speech with drooling and the patient has no recollection of this episode.  She was found to be quite hyperglycemic with a blood sugar in the 390 range.  At this time she is at baseline.    Differential includes but is not limited to encephalopathy, seizure, TIA, a life threat.    CBC, CMP, urinalysis, CT brain ordered for further evaluation.    My independent interpretation of CT of the brain is that there is cerebral atrophy.  No obvious acute intracranial hemorrhage.  Also reviewed radiology documentation which describes no acute findings.    I do have concern that patient's symptoms today could be related to encephalopathy versus syncope versus seizure versus TIA.    Discussed with neurology who agrees with plan for hospitalization for further evaluation of this episode.  They do feel it is possibly encephalopathy related to hyperglycemia but agree with need for further workup.  Discussed with patient and family and they are agreeable to plan for hospitalization.  Admitted to the [facility].    Admission disposition: 3/12/2025  5:35 PM    Medical Decision Making      Disposition and Plan     Clinical Impression:  1. Transient alteration of awareness    2. Hyperglycemia     Disposition:  Admit  3/12/2025  5:35 pm        3/13/25 Neurology     REASON FOR EVALUATION:  Transient alteration in awareness     HISTORY OF PRESENT ILLNESS:  Mrs. Rust is a 79-year-old woman with diabetes mellitus and coronary artery disease who was brought to the emergency department because of an episode of loss of awareness.  She is unable to provide much in the way of a clinical history because she does not remember what was happening leading up to this episode.  She remembers several hours before this and she remembers being in the  emergency department but nothing in the home around the time of this event.  Her , at the bedside, is able to provide collateral history.  At approximately 6 PM they went into their kitchen for dinner and everything seemed to be okay.  He went to the refrigerator to get her something to eat and when he turned around she was sitting with her head slumped forward.  He went to try and wake her up but she was mumbling and her eyes were closed.  This lasted several minutes.  She seemed to be normal within several minutes.  In the meantime he had called 911 because nothing like this had ever happened before.  She had not had her insulin yesterday because she usually takes it around that time.  He did not have a sense that she was not eating or drinking normally that day.  She feels normal now.       ASSESSMENT:  Mrs. Rust is a 79-year-old woman with uncontrolled diabetes mellitus who experienced a nonspecific episode of diminished awareness, possibly related to hyperglycemia.     PLAN:  Active Problems:    * No active hospital problems. *  MRI brain without contrast, MRA head/neck without contrast.    Await the results of routine EEG.    Orthostatic vital signs, heart rate and blood pressure.    Otherwise continue supportive care.  We are following.     Cleveland Stewart MD        3/14 Neurology     Subjective:  Yeny Rust is a 79 years old female with PMHx significant for DM2, HTN, HLD, CAD, s/p CABG, OA, RA, breast cancer and hypothyroidism, who presented to the ED on 3/12 with and episode of loss of awareness. The episode is described by spouse as several minutes of not responding, mumbling and eyes were closed. Pt does not recall the event. Work up revealed elevated blood sugars in ED, as well as CT head with no acute pathology. Pt was admitted for further investigations, neurology was consulted.     Pt seen for a follow up visit today. In bed, awake, alert and oriented x 4. Eating breakfast.  Jassi  is at bedside. Currently, the patient is comfortable, denies any pain, dizziness, diplopia, dysphagia, headache, numbness/tingling, no focal weakness. Orthostatic vistas were negative, EEG showed no epileptiform activity.     A 79 years old female with:     Nonspecific episode of diminished awareness, possibly related to dehydration, not eating and hyperglycemia. Seizure as well as stroke/TIA are also in the differential, as pt has risk factors of age, CAD, HTN, HLD, DM. Work up:  CT head - no acute findings  MRI brain/MRA head and neck - pending  Routine EEG - no epileptiform activity  Orthostatic vital signs - negative  Labs: UA /urine cx - pending, leucocytosis noted. B12 and folate normal  Safety and falls precautions, PT/OT and rehab evals  Continue supportive care with sufficient hydration, nutrition and sleep  Uncontrolled diabetes - presented with hyperglycemia with  per ED records. A1c 12.8. Endocrinology following.  Hx of CAD s/p CABG - continue ASA and statin  Discussed with pt and spouse, all questions answered. Verbalized understanding. Discussed with dr. Stewart, we will continue to follow pending above testing.   Is this a shared or split note between Advanced Practice Provider and Physician? Yes         ADDENDUM @ 1800: MRI/A studies are unremarkable, with nothing pathological to explain her clinical episode. She was likely briefly encephalopathic in the setting of persistent significant hyperglycemia and I do suspect underlying cognitive impairment. She can dismiss from the hospital at the discretion of her attending physician. She should follow up with neurology in 4-6 weeks for consideration of workup for cognitive impairment. Neurology will sign off but call back should concerns arise.     Bindu Beatty [facility]        3/14 Endicrinology         History of Present Illness: Yeny Rust is a 79 year old female with a PMH of T2DM, HTN, HLD, hypothyroidism,  breast/basal cell/squamous cell CA, and CAD s/p CABG admitted 3/12/2025 from home with complaints of hyperglycemia ( per EMS) and confusion. ED evaluation revealed continued hyperglycemia, as well as acute metabolic encephalopathy.           Assessment/Recommendations:  Upon interview today, patients states she was diagnosed with T2DM at age 60 and has followed with her PCP for management since that time. Patient and  at bedside report 100% compliance with home metformin and basal insulin administration; however, neither could reiterate which type of insulin or dose they were giving. Explained to patient and  need for awareness regarding medication types and dosages; suggested writing on piece of paper and carrying in both of their wallets for easy retrieval. Further explained that anti-diabetic medication compliance necessary to avoid confusion episode that cause admission (BG >400 via EMS). Patient and  state understanding of insulin pen usage and that she has new Dexcom CGM prescription at home; recommended patient and  to start CGM wear once home in order to trend BG values at home. Recommended patient and  increase basal insulin dose by 1-2u every other day until fasted AM glucose <140. Finally, recommended close follow-up with PCP for continued T2DM management; may be eligible for ozempic or mounjaro as weekly injectable option. Patient states understanding of and willingness to participate in plan of care.         Plan:  Inpatient recommendations -   Degludec = recommend initiating at 20u every day (similar to patient's home dose)  Aspart = given discontinuation of oral anti-diabetics on admission, recommend initiating at medium dose sliding scale  Discharge recommendations -   Restart home oral anti-diabetics:  Glargine = 20u every day  Patient educated to increase glargine dose by 1-2u every other day if fasted BG >140   Metformin = 1000mg BID        Prescription  Recommendations:  Medicare:  Insulin:   Novolog, Fiasp, Apidra, Admelog, Levemir, Lantus, Basaglar, Tresiba, Toujeo  (If no secondary insurance, may need prior auth)  Supplies:  BD pen needles (Maranda)  Glucometer:  OneTouch        Provider follow up recommendations:  PCP - Dr. Haque ([place])  Endocrinology/[facility] - patient's  prefers to stay with PCP for T2DM management at this time        3/15       poke with daughter Tianna to discuss discharge planning. She is driving and will call SW back when she is free today.     Anticipated therapy need: Home with Home Healthcare  Sent referral for HH on [facility]. Patient has a history with [facility].      SW/CM to remain available for support and/or discharge planning.     Addendum 2pm: Patient/ informed nurse of preference to use [facility]. Sent referral on [facility] and spoke with representative, they are able to accept her. Reserved on [facility].             Discharge Plan     Discharged: 3/15/2025 1555         MEDICATIONS ADMINISTERED IN LAST 1 DAY:    Hospital Medications  insulin aspart (NovoLOG) 100 Units/mL vial 11 Units (Completed)  sodium chloride 0.9 % IV bolus 500 mL (Completed)  lactated ringers IV bolus 500 mL (Completed)  magnesium oxide (Mag-Ox) tab 800 mg (Completed)  potassium chloride (Klor-Con M20) tab 40 mEq (Completed)  magnesium oxide (Mag-Ox) tab 800 mg (Completed)  potassium chloride (Klor-Con M20) tab 40 mEq (Completed)  potassium chloride (Klor-Con M20) tab 40 mEq (Completed)  azithromycin (Zithromax) tab 500 mg (Completed)  azithromycin (Zithromax) 250 MG tab ()  azithromycin (Zithromax) 250 MG tab ()  Perflutren Lipid Microsphere (DEFINITY) 6.52 MG/ML injection 1.5 mL (Completed)  L1  sodium chloride 0.9 % IV bolus 500 mL (Completed)  cefTRIAXone (Rocephin) 1 g in sodium chloride 0.9% 100 mL IVPB-ADDV (Completed)  azithromycin (Zithromax) 500 mg in sodium chloride 0.9% 250mL IVPB premix (Completed)  potassium chloride 20  mEq/100mL IVPB premix 20 mEq (Completed)        Vitals (last day) before discharge       Date/Time Temp Pulse Resp BP SpO2 Weight O2 Device O2 Flow Rate (L/min) Who    03/15/25 1230 99.3 °F (37.4 °C) -- -- -- -- -- None (Room air) -- KH    03/15/25 1230 -- 88 17 96/63 -- -- -- -- BM    03/15/25 0800 97.6 °F (36.4 °C) 80 11 142/73 96 % -- -- -- PP    03/15/25 0400 98 °F (36.7 °C) 90 13 131/63 -- -- -- -- JG    03/15/25 0000 97.8 °F (36.6 °C) 85 16 121/74 96 % -- None (Room air) -- DR    03/14/25 2030 98.5 °F (36.9 °C) 93 19 138/73 96 % -- None (Room air) -- DR    03/14/25 1645 98.2 °F (36.8 °C) 91 20 122/71 94 % -- None (Room air) -- KH    03/14/25 1130 98.2 °F (36.8 °C) 88 18 127/76 96 % -- None (Room air) -- HT    03/14/25 0815 98.4 °F (36.9 °C) 91 19 123/71 98 % -- None (Room air) -- HT    03/14/25 0400 97.5 °F (36.4 °C) 79 13 129/81 97 % -- None (Room air) -- VO    03/14/25 0000 97.8 °F (36.6 °C) 84 19 139/85 100 % -- None (Room air) -- VO

## 2025-03-19 NOTE — ED QUICK NOTES
Orders for admission, patient is aware of plan and ready to go upstairs. Any questions, please call ED FREDERICK Hemphill at extension [phone].     Patient Covid vaccination status: Fully vaccinated     COVID Test Ordered in ED: SARS-CoV-2/Flu A and B/RSV by PCR (GeneXpert)    COVID Suspicion at Admission: N/A    Running Infusions:    sodium chloride 125 mL/hr at 01/08/25 0750        Mental Status/LOC at time of transport: oriented to person and place but confused to time    Other pertinent information:   CIWA score: N/A   NIH score:  N/A        
Pt sleeping and in no distress. Awaiting bed assignment.   
Pt sleeping and in no distress. Report given to FREDERICK mendez.  
Pt spouse here,updated on plan of care.   
Report received from FREDERICK Aguilar.    Per Lauren, DR. Cano was not concerned about urine sample due to pt with pneumonia.     Per Lauren, rocephin and 500 ml fluid bolus infusing.     Received pt to find pt awake and alert, oriented to person and place, confused to year. Pt noted to be on 2l/nc and rocephin infusing via pump.  ml bag noted to be connected to pt but not infusing.     Pt requesting to void. Pt repositioned on cart for comfort and pure wick placed after discussed with pt and pt in agreement. Pt denies complaints at this time but reports she is unsure why she is here.   
Detail Level: Generalized
Detail Level: Zone
Detail Level: Detailed

## (undated) NOTE — ED AVS SNAPSHOT
Elaine Chen   MRN: [id number]    Department:  [facility] Emergency Department   Date of Visit:  12/23/2019           Disclosure     Insurance plans vary and the physician(s) referred by the ER may not be covered by your plan.  Please contact you tell this physician (or your personal doctor if your instructions are to return to your personal doctor) about any new or lasting problems. The primary care or specialist physician will see patients referred from the [facility] Emergency Department.  Chucho Gates